# Patient Record
Sex: MALE | Race: WHITE | NOT HISPANIC OR LATINO | Employment: OTHER | URBAN - METROPOLITAN AREA
[De-identification: names, ages, dates, MRNs, and addresses within clinical notes are randomized per-mention and may not be internally consistent; named-entity substitution may affect disease eponyms.]

---

## 2017-01-06 RX ORDER — SIMVASTATIN 10 MG
TABLET ORAL
Qty: 90 TABLET | Refills: 0 | Status: SHIPPED | OUTPATIENT
Start: 2017-01-06 | End: 2017-04-01 | Stop reason: SDUPTHER

## 2017-01-23 RX ORDER — VALSARTAN AND HYDROCHLOROTHIAZIDE 80; 12.5 MG/1; MG/1
TABLET, FILM COATED ORAL
Qty: 90 TABLET | Refills: 0 | Status: SHIPPED | OUTPATIENT
Start: 2017-01-23 | End: 2017-04-17 | Stop reason: SDUPTHER

## 2017-03-08 RX ORDER — ESCITALOPRAM OXALATE 10 MG/1
TABLET ORAL
Qty: 30 TABLET | Refills: 0 | Status: SHIPPED | OUTPATIENT
Start: 2017-03-08 | End: 2017-04-05 | Stop reason: SDUPTHER

## 2017-04-03 RX ORDER — SIMVASTATIN 10 MG
TABLET ORAL
Qty: 90 TABLET | Refills: 0 | Status: SHIPPED | OUTPATIENT
Start: 2017-04-03 | End: 2017-06-26 | Stop reason: SDUPTHER

## 2017-04-05 RX ORDER — ESCITALOPRAM OXALATE 10 MG/1
TABLET ORAL
Qty: 15 TABLET | Refills: 0 | Status: SHIPPED | OUTPATIENT
Start: 2017-04-05 | End: 2018-06-13 | Stop reason: ALTCHOICE

## 2017-04-17 RX ORDER — VALSARTAN AND HYDROCHLOROTHIAZIDE 80; 12.5 MG/1; MG/1
TABLET, FILM COATED ORAL
Qty: 30 TABLET | Refills: 0 | Status: SHIPPED | OUTPATIENT
Start: 2017-04-17 | End: 2017-05-25 | Stop reason: SDUPTHER

## 2017-04-28 ENCOUNTER — OFFICE VISIT (OUTPATIENT)
Dept: FAMILY MEDICINE CLINIC | Facility: CLINIC | Age: 62
End: 2017-04-28

## 2017-04-28 VITALS
DIASTOLIC BLOOD PRESSURE: 82 MMHG | WEIGHT: 263 LBS | SYSTOLIC BLOOD PRESSURE: 138 MMHG | HEIGHT: 74 IN | OXYGEN SATURATION: 98 % | HEART RATE: 70 BPM | BODY MASS INDEX: 33.75 KG/M2

## 2017-04-28 DIAGNOSIS — Z00.00 HEALTHCARE MAINTENANCE: ICD-10-CM

## 2017-04-28 DIAGNOSIS — I10 BENIGN ESSENTIAL HYPERTENSION: ICD-10-CM

## 2017-04-28 DIAGNOSIS — E78.2 MIXED HYPERLIPIDEMIA: ICD-10-CM

## 2017-04-28 DIAGNOSIS — L98.9 SKIN LESION OF LEFT ARM: Primary | ICD-10-CM

## 2017-04-28 PROCEDURE — 99212 OFFICE O/P EST SF 10 MIN: CPT | Performed by: FAMILY MEDICINE

## 2017-04-28 NOTE — PROGRESS NOTES
"Subjective   Sachin Tolliver is a 61 y.o. male.     Chief Complaint   Patient presents with   • Suspicious Skin Lesion     L arm x 2 weeks      Social History   Substance Use Topics   • Smoking status: Never Smoker   • Smokeless tobacco: Never Used   • Alcohol use None       History of Present Illness         The following portions of the patient's history were reviewed and updated as appropriate:PMHroutine: Social history , Allergies, Current Medications, Active Problem List and Health Maintenance    Review of Systems   Constitutional: Negative for activity change, appetite change, chills, fatigue, fever and unexpected weight change.   HENT: Negative for congestion, ear pain, hearing loss, mouth sores, nosebleeds, rhinorrhea and sore throat.    Eyes: Negative for pain and visual disturbance.   Respiratory: Negative for cough, shortness of breath and wheezing.    Cardiovascular: Negative for chest pain, palpitations and leg swelling.   Gastrointestinal: Negative for abdominal distention, abdominal pain, blood in stool, constipation, diarrhea, nausea and vomiting.   Endocrine: Negative for cold intolerance and heat intolerance.   Genitourinary: Negative for difficulty urinating, discharge, dysuria, frequency, hematuria and urgency.   Musculoskeletal: Negative for back pain and joint swelling.   Skin: Negative for rash and wound.        Skin lesion    Neurological: Negative for dizziness, weakness, numbness and headaches.   Hematological: Does not bruise/bleed easily.   Psychiatric/Behavioral: Negative for confusion, dysphoric mood, sleep disturbance and suicidal ideas. The patient is not nervous/anxious.        Objective   Vitals:    04/28/17 1616   BP: 138/82   Pulse: 70   SpO2: 98%   Weight: 263 lb (119 kg)   Height: 74\" (188 cm)     Body mass index is 33.77 kg/(m^2).    Physical Exam   Constitutional: He appears well-developed and well-nourished.   Skin:    Heaped up lesion on left forearm with scaliness about 5 " mm in diameter   Psychiatric: He has a normal mood and affect. His behavior is normal. Judgment and thought content normal.   Vitals reviewed.      Assessment/Plan   Problem List Items Addressed This Visit        Cardiovascular and Mediastinum    Benign essential hypertension    Relevant Orders    Comprehensive metabolic panel    Lipid Panel With LDL/HDL Ratio    Hyperlipidemia    Relevant Orders    Comprehensive metabolic panel    Lipid Panel With LDL/HDL Ratio      Other Visit Diagnoses     Skin lesion of left arm    -  Primary    Relevant Orders    Ambulatory Referral to Dermatology    Healthcare maintenance        Relevant Orders    CBC and Differential

## 2017-05-12 RX ORDER — ESCITALOPRAM OXALATE 10 MG/1
TABLET ORAL
Qty: 90 TABLET | Refills: 0 | Status: SHIPPED | OUTPATIENT
Start: 2017-05-12 | End: 2017-05-18 | Stop reason: ALTCHOICE

## 2017-05-18 ENCOUNTER — OFFICE VISIT (OUTPATIENT)
Dept: CARDIOLOGY | Facility: CLINIC | Age: 62
End: 2017-05-18

## 2017-05-18 VITALS
HEIGHT: 74 IN | SYSTOLIC BLOOD PRESSURE: 138 MMHG | BODY MASS INDEX: 33.75 KG/M2 | WEIGHT: 263 LBS | DIASTOLIC BLOOD PRESSURE: 88 MMHG | HEART RATE: 72 BPM

## 2017-05-18 DIAGNOSIS — R01.1 MURMUR, CARDIAC: Primary | ICD-10-CM

## 2017-05-18 PROCEDURE — 93000 ELECTROCARDIOGRAM COMPLETE: CPT | Performed by: INTERNAL MEDICINE

## 2017-05-18 PROCEDURE — 99203 OFFICE O/P NEW LOW 30 MIN: CPT | Performed by: INTERNAL MEDICINE

## 2017-05-26 ENCOUNTER — TELEPHONE (OUTPATIENT)
Dept: FAMILY MEDICINE CLINIC | Facility: CLINIC | Age: 62
End: 2017-05-26

## 2017-05-26 DIAGNOSIS — G43.109 MIGRAINE WITH AURA AND WITHOUT STATUS MIGRAINOSUS, NOT INTRACTABLE: Primary | ICD-10-CM

## 2017-05-26 RX ORDER — BUTALBITAL, ACETAMINOPHEN AND CAFFEINE 50; 325; 40 MG/1; MG/1; MG/1
1 TABLET ORAL EVERY 6 HOURS PRN
Qty: 25 TABLET | Refills: 1 | OUTPATIENT
Start: 2017-05-26 | End: 2019-01-28

## 2017-05-26 RX ORDER — VALSARTAN AND HYDROCHLOROTHIAZIDE 80; 12.5 MG/1; MG/1
TABLET, FILM COATED ORAL
Qty: 90 TABLET | Refills: 0 | Status: SHIPPED | OUTPATIENT
Start: 2017-05-26 | End: 2017-08-18 | Stop reason: SDUPTHER

## 2017-05-30 ENCOUNTER — HOSPITAL ENCOUNTER (OUTPATIENT)
Dept: CARDIOLOGY | Facility: HOSPITAL | Age: 62
Discharge: HOME | End: 2017-05-30
Attending: INTERNAL MEDICINE | Admitting: INTERNAL MEDICINE

## 2017-05-30 VITALS
HEIGHT: 74 IN | BODY MASS INDEX: 33.75 KG/M2 | WEIGHT: 263 LBS | DIASTOLIC BLOOD PRESSURE: 86 MMHG | SYSTOLIC BLOOD PRESSURE: 130 MMHG | HEART RATE: 77 BPM

## 2017-05-30 DIAGNOSIS — R01.1 MURMUR, CARDIAC: ICD-10-CM

## 2017-05-30 LAB
ASCENDING AORTA: 3.9 CM
BH CV ECHO MEAS - ACS: 1.2 CM
BH CV ECHO MEAS - AO MAX PG (FULL): 64.9 MMHG
BH CV ECHO MEAS - AO MAX PG: 67.3 MMHG
BH CV ECHO MEAS - AO MEAN PG (FULL): 43 MMHG
BH CV ECHO MEAS - AO MEAN PG: 44.6 MMHG
BH CV ECHO MEAS - AO ROOT AREA (BSA CORRECTED): 1.4
BH CV ECHO MEAS - AO ROOT AREA: 8.8 CM^2
BH CV ECHO MEAS - AO ROOT DIAM: 3.3 CM
BH CV ECHO MEAS - AO V2 MAX: 410.2 CM/SEC
BH CV ECHO MEAS - AO V2 MEAN: 319.4 CM/SEC
BH CV ECHO MEAS - AO V2 VTI: 87.7 CM
BH CV ECHO MEAS - AVA(I,A): 1.2 CM^2
BH CV ECHO MEAS - AVA(I,D): 1.2 CM^2
BH CV ECHO MEAS - AVA(V,A): 1 CM^2
BH CV ECHO MEAS - AVA(V,D): 1 CM^2
BH CV ECHO MEAS - BSA(HAYCOCK): 2.5 M^2
BH CV ECHO MEAS - BSA: 2.4 M^2
BH CV ECHO MEAS - BZI_BMI: 33.8 KILOGRAMS/M^2
BH CV ECHO MEAS - BZI_METRIC_HEIGHT: 188 CM
BH CV ECHO MEAS - BZI_METRIC_WEIGHT: 119.3 KG
BH CV ECHO MEAS - CONTRAST EF (2CH): 62.1 ML/M^2
BH CV ECHO MEAS - CONTRAST EF 4CH: 59.1 ML/M^2
BH CV ECHO MEAS - EDV(MOD-SP2): 153 ML
BH CV ECHO MEAS - EDV(MOD-SP4): 137 ML
BH CV ECHO MEAS - EDV(TEICH): 140.5 ML
BH CV ECHO MEAS - EF(CUBED): 70 %
BH CV ECHO MEAS - EF(MOD-SP2): 62.1 %
BH CV ECHO MEAS - EF(MOD-SP4): 61 %
BH CV ECHO MEAS - EF(TEICH): 61.1 %
BH CV ECHO MEAS - ESV(MOD-SP2): 58 ML
BH CV ECHO MEAS - ESV(MOD-SP4): 56 ML
BH CV ECHO MEAS - ESV(TEICH): 54.6 ML
BH CV ECHO MEAS - FS: 33.1 %
BH CV ECHO MEAS - IVS/LVPW: 1
BH CV ECHO MEAS - IVSD: 1.1 CM
BH CV ECHO MEAS - LAT PEAK E' VEL: 8 CM/SEC
BH CV ECHO MEAS - LV DIASTOLIC VOL/BSA (35-75): 56.1 ML/M^2
BH CV ECHO MEAS - LV MASS(C)D: 242.3 GRAMS
BH CV ECHO MEAS - LV MASS(C)DI: 99.2 GRAMS/M^2
BH CV ECHO MEAS - LV MAX PG: 2.4 MMHG
BH CV ECHO MEAS - LV MEAN PG: 1.6 MMHG
BH CV ECHO MEAS - LV SYSTOLIC VOL/BSA (12-30): 22.9 ML/M^2
BH CV ECHO MEAS - LV V1 MAX: 77.1 CM/SEC
BH CV ECHO MEAS - LV V1 MEAN: 61.1 CM/SEC
BH CV ECHO MEAS - LV V1 VTI: 19.8 CM
BH CV ECHO MEAS - LVIDD: 5.4 CM
BH CV ECHO MEAS - LVIDS: 3.6 CM
BH CV ECHO MEAS - LVLD AP2: 8.7 CM
BH CV ECHO MEAS - LVLD AP4: 8.8 CM
BH CV ECHO MEAS - LVLS AP2: 6.9 CM
BH CV ECHO MEAS - LVLS AP4: 7.6 CM
BH CV ECHO MEAS - LVOT AREA (M): 5.3 CM^2
BH CV ECHO MEAS - LVOT AREA: 5.5 CM^2
BH CV ECHO MEAS - LVOT DIAM: 2 CM
BH CV ECHO MEAS - LVPWD: 1.1 CM
BH CV ECHO MEAS - MED PEAK E' VEL: 4 CM/SEC
BH CV ECHO MEAS - MR MAX PG: 56.5 MMHG
BH CV ECHO MEAS - MR MAX VEL: 375.9 CM/SEC
BH CV ECHO MEAS - MV A DUR: 0.1 SEC
BH CV ECHO MEAS - MV A MAX VEL: 67.4 CM/SEC
BH CV ECHO MEAS - MV DEC SLOPE: 159.7 CM/SEC^2
BH CV ECHO MEAS - MV DEC TIME: 0.23 SEC
BH CV ECHO MEAS - MV E MAX VEL: 37.3 CM/SEC
BH CV ECHO MEAS - MV E/A: 0.55
BH CV ECHO MEAS - MV MAX PG: 1.8 MMHG
BH CV ECHO MEAS - MV MEAN PG: 0.67 MMHG
BH CV ECHO MEAS - MV P1/2T MAX VEL: 38.1 CM/SEC
BH CV ECHO MEAS - MV P1/2T: 69.8 MSEC
BH CV ECHO MEAS - MV V2 MAX: 67.9 CM/SEC
BH CV ECHO MEAS - MV V2 MEAN: 37.3 CM/SEC
BH CV ECHO MEAS - MV V2 VTI: 16.8 CM
BH CV ECHO MEAS - MVA P1/2T LCG: 5.8 CM^2
BH CV ECHO MEAS - MVA(P1/2T): 3.2 CM^2
BH CV ECHO MEAS - MVA(VTI): 6.5 CM^2
BH CV ECHO MEAS - PA ACC TIME: 0.12 SEC
BH CV ECHO MEAS - PA MAX PG (FULL): 9 MMHG
BH CV ECHO MEAS - PA MAX PG: 10.8 MMHG
BH CV ECHO MEAS - PA PR(ACCEL): 26.7 MMHG
BH CV ECHO MEAS - PA V2 MAX: 164.2 CM/SEC
BH CV ECHO MEAS - PULM A REVS DUR: 0.07 SEC
BH CV ECHO MEAS - PULM A REVS VEL: 19.7 CM/SEC
BH CV ECHO MEAS - PULM DIAS VEL: 36 CM/SEC
BH CV ECHO MEAS - PULM S/D: 0.98
BH CV ECHO MEAS - PULM SYS VEL: 35.1 CM/SEC
BH CV ECHO MEAS - PVA(V,A): 1.4 CM^2
BH CV ECHO MEAS - PVA(V,D): 1.4 CM^2
BH CV ECHO MEAS - QP/QS: 0.5
BH CV ECHO MEAS - RV MAX PG: 1.8 MMHG
BH CV ECHO MEAS - RV MEAN PG: 0.97 MMHG
BH CV ECHO MEAS - RV V1 MAX: 66.9 CM/SEC
BH CV ECHO MEAS - RV V1 MEAN: 47 CM/SEC
BH CV ECHO MEAS - RV V1 VTI: 15.2 CM
BH CV ECHO MEAS - RVOT AREA: 3.6 CM^2
BH CV ECHO MEAS - RVOT DIAM: 2.1 CM
BH CV ECHO MEAS - RVSP: 3 MMHG
BH CV ECHO MEAS - SI(AO): 315.6 ML/M^2
BH CV ECHO MEAS - SI(CUBED): 44.8 ML/M^2
BH CV ECHO MEAS - SI(LVOT): 44.7 ML/M^2
BH CV ECHO MEAS - SI(MOD-SP2): 38.9 ML/M^2
BH CV ECHO MEAS - SI(MOD-SP4): 33.2 ML/M^2
BH CV ECHO MEAS - SI(TEICH): 35.2 ML/M^2
BH CV ECHO MEAS - SUP REN AO DIAM: 2.3 CM
BH CV ECHO MEAS - SV(AO): 770.6 ML
BH CV ECHO MEAS - SV(CUBED): 109.4 ML
BH CV ECHO MEAS - SV(LVOT): 109.2 ML
BH CV ECHO MEAS - SV(MOD-SP2): 95 ML
BH CV ECHO MEAS - SV(MOD-SP4): 81 ML
BH CV ECHO MEAS - SV(RVOT): 54.1 ML
BH CV ECHO MEAS - SV(TEICH): 85.8 ML
BH CV ECHO MEAS - TAPSE (>1.6): 2.9 CM2
BH CV ECHO MEAS - TR MAX V: 11 MMHG
BH CV ECHO MEAS - TR MAX VEL: 144.6 CM/SEC
BH CV XLRA - RV BASE: 3.7 CM
BH CV XLRA - TDI S': 17 CM/SEC
E/E' RATIO: 6.5
LEFT ATRIUM VOLUME INDEX: 25 ML/M2
SINUS: 3.5 CM
STJ: 3.5 CM

## 2017-05-30 PROCEDURE — C8929 TTE W OR WO FOL WCON,DOPPLER: HCPCS

## 2017-05-30 PROCEDURE — 25010000002 PERFLUTREN (DEFINITY) 8.476 MG IN SODIUM CHLORIDE 10 ML INJECTION: Performed by: INTERNAL MEDICINE

## 2017-05-30 PROCEDURE — 93306 TTE W/DOPPLER COMPLETE: CPT | Performed by: INTERNAL MEDICINE

## 2017-05-30 RX ADMIN — PERFLUTREN 1.5 ML: 6.52 INJECTION, SUSPENSION INTRAVENOUS at 11:21

## 2017-06-26 RX ORDER — SIMVASTATIN 10 MG
TABLET ORAL
Qty: 90 TABLET | Refills: 0 | Status: SHIPPED | OUTPATIENT
Start: 2017-06-26 | End: 2017-09-21 | Stop reason: SDUPTHER

## 2017-08-07 RX ORDER — ESCITALOPRAM OXALATE 10 MG/1
TABLET ORAL
Qty: 90 TABLET | Refills: 0 | Status: SHIPPED | OUTPATIENT
Start: 2017-08-07 | End: 2017-10-29 | Stop reason: SDUPTHER

## 2017-08-18 RX ORDER — VALSARTAN AND HYDROCHLOROTHIAZIDE 80; 12.5 MG/1; MG/1
TABLET, FILM COATED ORAL
Qty: 90 TABLET | Refills: 0 | Status: SHIPPED | OUTPATIENT
Start: 2017-08-18 | End: 2017-11-09 | Stop reason: SDUPTHER

## 2017-09-22 RX ORDER — SIMVASTATIN 10 MG
TABLET ORAL
Qty: 90 TABLET | Refills: 0 | Status: SHIPPED | OUTPATIENT
Start: 2017-09-22 | End: 2017-12-29 | Stop reason: SDUPTHER

## 2017-10-31 RX ORDER — ESCITALOPRAM OXALATE 10 MG/1
TABLET ORAL
Qty: 90 TABLET | Refills: 0 | Status: SHIPPED | OUTPATIENT
Start: 2017-10-31 | End: 2017-12-29 | Stop reason: SDUPTHER

## 2017-11-09 RX ORDER — VALSARTAN AND HYDROCHLOROTHIAZIDE 80; 12.5 MG/1; MG/1
TABLET, FILM COATED ORAL
Qty: 30 TABLET | Refills: 0 | Status: SHIPPED | OUTPATIENT
Start: 2017-11-09 | End: 2017-12-03 | Stop reason: SDUPTHER

## 2017-11-30 ENCOUNTER — OFFICE VISIT (OUTPATIENT)
Dept: GASTROENTEROLOGY | Facility: CLINIC | Age: 62
End: 2017-11-30

## 2017-11-30 VITALS
WEIGHT: 263 LBS | HEIGHT: 74 IN | TEMPERATURE: 98.2 F | BODY MASS INDEX: 33.75 KG/M2 | SYSTOLIC BLOOD PRESSURE: 122 MMHG | DIASTOLIC BLOOD PRESSURE: 74 MMHG

## 2017-11-30 DIAGNOSIS — K59.1 FUNCTIONAL DIARRHEA: Primary | ICD-10-CM

## 2017-11-30 PROCEDURE — 99213 OFFICE O/P EST LOW 20 MIN: CPT | Performed by: INTERNAL MEDICINE

## 2017-11-30 NOTE — PROGRESS NOTES
"Chief Complaint   Patient presents with   • Diarrhea     Subjective     HPI  Sachin Tolliver is a 62 y.o. male who presents for follow up of diarrhea.  Last seen in the office November 2016.  He was complaining of fecal urgency following eating, then have explosive liquid stool multiple times per day.  This lasted for a week or 2 but then his primary care physician had him start on Align probiotic and had improvement back to his baseline.     Since then, he has done well.  He has continued the probiotic. However, 3-4 weeks ago, the symptoms returned.  He was having urgent \"blasts of water.\"  Lots of urgency and having 4-5 episodes throughout the day.  No nocturnal stools.  Blood in his stools.  No changes in his medications, diet, activity.  During the symptoms he did have some sinusitis.  He took some OTC antihistamines and guaifenisen.  He was still taking probiotics during the entire time.  Symptoms since resolved on their own.      There is no pain, no nausea, no vomiting.  His weight is stable.    He does have a history of sigmoidectomy for diverticulosis in 2009.     No family history of colon cancer or colon polyps.  Next colonoscopy due 2019--He called Dr. Joshi's office and double checked this.    He follows with Dr. Irene regarding his heart murmur.    Past Medical History:   Diagnosis Date   • Aortic valve calcification    • Benign essential hypertension    • Broken nose    • Heart murmur    • Hemolytic anemia    • Hyperlipidemia    • Migraine    • Mild mitral regurgitation    • Pulmonic regurgitation     trace   • Reactive depression (situational)    • Skin lesion     suspicous, left arm x 2 weeks   • Tricuspid regurgitation     trace       Social History     Social History   • Marital status:      Spouse name: N/A   • Number of children: N/A   • Years of education: N/A     Social History Main Topics   • Smoking status: Never Smoker   • Smokeless tobacco: Never Used   • Alcohol use Yes   • " Drug use: No   • Sexual activity: Not Asked     Other Topics Concern   • None     Social History Narrative         Current Outpatient Prescriptions:   •  aspirin 81 MG tablet, Take by mouth., Disp: , Rfl:   •  butalbital-acetaminophen-caffeine (FIORICET, ESGIC) -40 MG per tablet, Take 1 tablet by mouth Every 6 (Six) Hours As Needed for Headache., Disp: 25 tablet, Rfl: 1  •  escitalopram (LEXAPRO) 10 MG tablet, TAKE 1 TABLET BY MOUTH EVERY DAY, Disp: 15 tablet, Rfl: 0  •  escitalopram (LEXAPRO) 10 MG tablet, TAKE 1 TABLET BY MOUTH DAILY, Disp: 90 tablet, Rfl: 0  •  Glucosamine-Chondroit-Vit C-Mn (GLUCOSAMINE CHONDR 1500 COMPLX PO), Take by mouth., Disp: , Rfl:   •  Melatonin 5 MG capsule, Take by mouth., Disp: , Rfl:   •  Multiple Vitamin (MULTIVITAMINS PO), Take by mouth., Disp: , Rfl:   •  Omega 3 1000 MG capsule, Take by mouth., Disp: , Rfl:   •  Probiotic Product (PROBIOTIC PO), Take  by mouth., Disp: , Rfl:   •  simvastatin (ZOCOR) 10 MG tablet, TAKE 1 TABLET BY MOUTH EVERY DAY, Disp: 90 tablet, Rfl: 0  •  valsartan-hydrochlorothiazide (DIOVAN-HCT) 80-12.5 MG per tablet, TAKE 1 TABLET BY MOUTH EVERY DAY, Disp: 30 tablet, Rfl: 0    Review of Systems   Constitutional: Negative for activity change, appetite change and fatigue.   HENT: Negative for sore throat and trouble swallowing.    Respiratory: Negative.    Cardiovascular: Negative.    Gastrointestinal: Positive for diarrhea. Negative for abdominal distention, abdominal pain, anal bleeding, blood in stool, nausea and vomiting.   Endocrine: Negative for cold intolerance and heat intolerance.   Genitourinary: Negative for difficulty urinating, dysuria and frequency.   Musculoskeletal: Negative for arthralgias, back pain and myalgias.   Skin: Negative.    Hematological: Negative for adenopathy. Does not bruise/bleed easily.   All other systems reviewed and are negative.      Objective   Vitals:    11/30/17 0826   BP: 122/74   Temp: 98.2 °F (36.8 °C)     Last  Weight    11/30/17  0826   Weight: 263 lb (119 kg)     Body mass index is 33.77 kg/(m^2).      Physical Exam   Constitutional: He is oriented to person, place, and time. He appears well-developed and well-nourished. No distress.   HENT:   Head: Normocephalic and atraumatic.   Right Ear: External ear normal.   Left Ear: External ear normal.   Nose: Nose normal.   Eyes: Conjunctivae and EOM are normal. No scleral icterus.   Cardiovascular: Normal rate, regular rhythm and intact distal pulses.  Exam reveals no gallop.    Murmur heard.  No lower extremity edema   Pulmonary/Chest: Effort normal and breath sounds normal. No respiratory distress. He has no wheezes.   Abdominal: Soft. Normal appearance and bowel sounds are normal. He exhibits no distension and no mass. There is no hepatosplenomegaly. There is no tenderness. There is no rigidity, no rebound and no guarding. No hernia.   Obese   Genitourinary:   Genitourinary Comments: Rectal exam deferred   Musculoskeletal: Normal range of motion. He exhibits no edema or tenderness.   Normal digits and nails of both hands.  No atrophy or wasting of upper or lower extremeties   Neurological: He is alert and oriented to person, place, and time. He displays no atrophy. Coordination normal.   Skin: Skin is warm and dry. No rash noted. He is not diaphoretic. No erythema.   Psychiatric: He has a normal mood and affect. His behavior is normal. Judgment and thought content normal.   Vitals reviewed.      WBC   Date Value Ref Range Status   12/09/2015 5.35 4.50 - 10.70 K/Cumm Final     RBC   Date Value Ref Range Status   12/09/2015 4.59 (L) 4.60 - 6.00 Million Final     Hemoglobin   Date Value Ref Range Status   12/09/2015 14.1 13.7 - 17.6 g/dL Final     Hematocrit   Date Value Ref Range Status   12/09/2015 42.1 40.4 - 52.2 % Final     MCV   Date Value Ref Range Status   12/09/2015 91.7 79.8 - 96.2 fL Final     MCH   Date Value Ref Range Status   12/09/2015 30.7 27.0 - 32.7 pg Final      MCHC   Date Value Ref Range Status   12/09/2015 33.5 32.6 - 36.4 g/dL Final     RDW   Date Value Ref Range Status   12/09/2015 13.4 11.5 - 14.5 % Final     Platelets   Date Value Ref Range Status   12/09/2015 239 140 - 500 K/Cumm Final     Neutrophil Rel %   Date Value Ref Range Status   12/09/2015 47.1 42.7 - 76.0 % Final     Lymphocyte Rel %   Date Value Ref Range Status   12/09/2015 37.8 19.6 - 45.3 % Final     Monocyte Rel %   Date Value Ref Range Status   12/09/2015 9.9 5.0 - 12.0 % Final     Eosinophil Rel %   Date Value Ref Range Status   12/09/2015 2.6 0.3 - 6.2 % Final     Basophil Rel %   Date Value Ref Range Status   12/09/2015 1.3 0.0 - 1.5 % Final     Neutrophils Absolute   Date Value Ref Range Status   12/09/2015 2.5 1.9 - 8.1 K/Cumm Final     Lymphocytes Absolute   Date Value Ref Range Status   12/09/2015 2.0 0.9 - 4.8 K/Cumm Final     Monocytes Absolute   Date Value Ref Range Status   12/09/2015 0.5 0.2 - 1.2 K/Cumm Final     Eosinophils Absolute   Date Value Ref Range Status   12/09/2015 0.1 0.0 - 0.7 K/Cumm Final     Basophils Absolute   Date Value Ref Range Status   12/09/2015 0.1 0.0 - 0.2 K/Cumm Final       Lab Results   Component Value Date    BUN 18 12/09/2015    CREATININE 1.24 12/09/2015    EGFRIFNONA 60 12/09/2015    EGFRIFAFRI >60 12/09/2015    BCR 15 12/09/2015    CO2 24 12/09/2015    CALCIUM 9.6 12/09/2015    PROTENTOTREF 7.1 12/09/2015    ALBUMIN 4.4 12/09/2015    LABIL2 1.6 12/09/2015    AST 21 12/09/2015    ALT 18 12/09/2015         Imaging Results (last 7 days)     ** No results found for the last 168 hours. **            Assessment/Plan    1. Diarrhea: Limited episodes but moderately severe; self resolve.  Current episode was in the setting of some sinusitis.  I do question if it was some kind of viral issue causing the diarrhea did occur at that time versus related to the over-the-counter medication that he took with it, ie, antihistamine    Plan  We discussed our options.  At  this time, his symptoms have resolved.  The yield of either stool studies or colonoscopy is low at this time.  Certainly the limited and rare occurrences suggest against something more worrisome such as an inflammatory colitis.  I also think there is a strong likelihood that his symptoms may been related to either a viral illness given his concomitant sinusitis or the medication that he took to treat this (antihistamine).  I think the best option at this time is to continue to monitor symptoms.  He is to call the office should the diarrhea returned.  At that time we will check stool studies and start him on an anti-spasmodic.  Also I think should the symptoms recur again then repeat colonoscopy is warranted.  He is to continue the probiotic in the meantime    Greater than 8 minutes out of 15 were spent in face-to-face discussion of our options-- risks, benefits, and yields of each-- for further evaluation of his symptoms.    Sachin was seen today for diarrhea.    Diagnoses and all orders for this visit:    Functional diarrhea      Dictated utilizing Dragon dictation

## 2017-11-30 NOTE — PATIENT INSTRUCTIONS
Continue the probiotic    If the diarrhea returns, call the office. Will check stool studies and start an antispasmodic medication.      For any additional questions, concerns or changes to your condition after today's office visit please contact the office at [phone].

## 2017-12-04 RX ORDER — VALSARTAN AND HYDROCHLOROTHIAZIDE 80; 12.5 MG/1; MG/1
TABLET, FILM COATED ORAL
Qty: 15 TABLET | Refills: 0 | Status: SHIPPED | OUTPATIENT
Start: 2017-12-04 | End: 2017-12-29 | Stop reason: SDUPTHER

## 2017-12-05 ENCOUNTER — OFFICE VISIT (OUTPATIENT)
Dept: CARDIOLOGY | Facility: CLINIC | Age: 62
End: 2017-12-05

## 2017-12-05 VITALS
WEIGHT: 263 LBS | DIASTOLIC BLOOD PRESSURE: 80 MMHG | HEIGHT: 74 IN | HEART RATE: 76 BPM | BODY MASS INDEX: 33.75 KG/M2 | SYSTOLIC BLOOD PRESSURE: 128 MMHG

## 2017-12-05 DIAGNOSIS — I49.3 PVC (PREMATURE VENTRICULAR CONTRACTION): ICD-10-CM

## 2017-12-05 DIAGNOSIS — I35.0 NONRHEUMATIC AORTIC VALVE STENOSIS: Primary | ICD-10-CM

## 2017-12-05 PROCEDURE — 93000 ELECTROCARDIOGRAM COMPLETE: CPT | Performed by: INTERNAL MEDICINE

## 2017-12-05 PROCEDURE — 99213 OFFICE O/P EST LOW 20 MIN: CPT | Performed by: INTERNAL MEDICINE

## 2017-12-05 NOTE — PROGRESS NOTES
Subjective:     Encounter Date:12/05/2017      Patient ID: Sachin Tolliver is a 62 y.o. male.    Chief Complaint:  Heart Murmur   This is a chronic problem. The current episode started more than 1 month ago. The problem has been unchanged. Pertinent negatives include no chest pain, diaphoresis, fatigue, numbness or visual change.       62-year-old gentleman who presents today for reevaluation.  She was found to have moderate to severe aortic stenosis 6 months ago.  He presents today doing well really with no problems.  No chest pain shortness of breath lower extremity edema signs of heart failure no syncope no near syncope.  His murmur to me sounds about same        Review of Systems   Constitution: Negative for diaphoresis and fatigue.   Cardiovascular: Negative for chest pain.   Neurological: Negative for numbness.   All other systems reviewed and are negative.        ECG 12 Lead  Date/Time: 12/5/2017 1:28 PM  Performed by: EDIN DELANEY  Authorized by: EDIN DELANEY   Comparison: compared with previous ECG from 5/18/2017  Similar to previous ECG  Rhythm: sinus rhythm  Ectopy: unifocal PVCs  Clinical impression: abnormal ECG               Objective:     Physical Exam   Constitutional: He is oriented to person, place, and time. He appears well-developed.   HENT:   Head: Normocephalic.   Eyes: Conjunctivae are normal.   Neck: Normal range of motion.   Cardiovascular: Normal rate and regular rhythm.    Murmur heard.   Harsh midsystolic murmur is present with a grade of 3/6  at the upper right sternal border  Pulmonary/Chest: Breath sounds normal.   Abdominal: Soft. Bowel sounds are normal.   Musculoskeletal: Normal range of motion. He exhibits no edema.   Neurological: He is alert and oriented to person, place, and time.   Skin: Skin is warm and dry.   Psychiatric: He has a normal mood and affect. His behavior is normal.   Vitals reviewed.      Lab Review:       Assessment:          Diagnosis Plan   1.  Nonrheumatic aortic valve stenosis  Adult Transthoracic Echo Complete W/ Cont if Necessary Per Protocol    ECG 12 Lead   2. PVC (premature ventricular contraction)            Plan:       1.  Moderate to severe aortic stenosis.  Clinically he remains asymptomatic.  His murmur really hasn't changed much.  In light of that I elected to repeat his echo 6 months from now.  Again I reviewed signs and symptoms for him to look for and to contact me should they arise.  Things that I covered that not inclusive include chest pain shortness of breath syncope near syncope swelling and etc.  2.  Blood pressures great  3.  Patient did have PVCs noted on his EKG today.  They were not symptomatic.  I would follow for now clinically  4. Follow-up 6 months we will do an echo prior to being seen we'll discuss findings at his visit.

## 2017-12-29 ENCOUNTER — OFFICE VISIT (OUTPATIENT)
Dept: FAMILY MEDICINE CLINIC | Facility: CLINIC | Age: 62
End: 2017-12-29

## 2017-12-29 VITALS
SYSTOLIC BLOOD PRESSURE: 118 MMHG | HEIGHT: 74 IN | OXYGEN SATURATION: 98 % | DIASTOLIC BLOOD PRESSURE: 82 MMHG | HEART RATE: 74 BPM | BODY MASS INDEX: 34.14 KG/M2 | WEIGHT: 266 LBS

## 2017-12-29 DIAGNOSIS — I10 BENIGN ESSENTIAL HYPERTENSION: Primary | ICD-10-CM

## 2017-12-29 DIAGNOSIS — E78.2 MIXED HYPERLIPIDEMIA: ICD-10-CM

## 2017-12-29 PROCEDURE — 99213 OFFICE O/P EST LOW 20 MIN: CPT | Performed by: FAMILY MEDICINE

## 2017-12-29 RX ORDER — VALSARTAN AND HYDROCHLOROTHIAZIDE 80; 12.5 MG/1; MG/1
1 TABLET, FILM COATED ORAL DAILY
Qty: 90 TABLET | Refills: 1 | Status: SHIPPED | OUTPATIENT
Start: 2017-12-29 | End: 2018-07-14 | Stop reason: HOSPADM

## 2017-12-29 RX ORDER — SIMVASTATIN 10 MG
10 TABLET ORAL NIGHTLY
Qty: 90 TABLET | Refills: 0 | Status: SHIPPED | OUTPATIENT
Start: 2017-12-29 | End: 2018-03-27 | Stop reason: SDUPTHER

## 2017-12-29 NOTE — PROGRESS NOTES
Sachin Tolliver is a 62 y.o. male.      Assessment/Plan   Problem List Items Addressed This Visit        Cardiovascular and Mediastinum    Benign essential hypertension - Primary    [facility]     [facility]  BP is controlled well. He will recheck in six months. He will continue present meds.            Relevant Medications    valsartan-hydrochlorothiazide (DIOVAN-HCT) 80-12.5 MG per tablet    Hyperlipidemia    Overview     Heather 12/29/2017 Needs lab drawn. He promises to get it done at the hospital.          Relevant Medications    simvastatin (ZOCOR) 10 MG tablet             Return in about 6 months (around 6/29/2018).  Patient Instructions   Agree with weight loss.       Chief Complaint   Patient presents with   • Hypertension   • Med Refill     Social History   Substance Use Topics   • Smoking status: Never Smoker   • Smokeless tobacco: Never Used   • Alcohol use Yes      Comment: caffeine  use       History of Present Illness     Patient is here for follow-up of hypertension. He is not exercising and is adherent to a low-salt diet. Patient does not check BP at home.. Patient denies chest pain and dyspnea. Cardiovascular risk factors: advanced age (older than 55 for men, 65 for women), dyslipidemia, hypertension, male gender, obesity (BMI >= 30 kg/m2) and sedentary lifestyle. Use of agents associated with hypertension: none. History of target organ damage: none. He is compliant with meds.     The following portions of the patient's history were reviewed and updated as appropriate:PMHroutine: Social history , Allergies, Current Medications, Active Problem List and Health Maintenance    Review of Systems   Constitutional: Negative for activity change, appetite change, chills, fatigue, fever and unexpected weight change.   HENT: Negative for congestion, ear pain, hearing loss, mouth sores, nosebleeds, rhinorrhea and sore throat.    Eyes: Negative for pain and visual disturbance.   Respiratory: Negative  "for cough, shortness of breath and wheezing.    Cardiovascular: Negative for chest pain, palpitations and leg swelling.   Gastrointestinal: Negative for abdominal distention, abdominal pain, blood in stool, constipation, diarrhea, nausea and vomiting.   Endocrine: Negative for cold intolerance and heat intolerance.   Genitourinary: Negative for difficulty urinating, discharge, dysuria, frequency, hematuria and urgency.   Musculoskeletal: Negative for back pain and joint swelling.   Skin: Negative for rash and wound.   Neurological: Negative for dizziness, weakness, numbness and headaches.   Hematological: Does not bruise/bleed easily.   Psychiatric/Behavioral: Negative for confusion, dysphoric mood, sleep disturbance and suicidal ideas. The patient is not nervous/anxious.        Objective   Vitals:    12/29/17 1452   BP: 118/82   Pulse: 74   SpO2: 98%   Weight: 121 kg (266 lb)   Height: 188 cm (74.02\")     Body mass index is 34.14 kg/(m^2).  Physical Exam   Constitutional: He is oriented to person, place, and time. Vital signs are normal. He appears well-developed and well-nourished. No distress.   HENT:   Head: Normocephalic.   Cardiovascular: Normal rate.    Murmur (2/6 holosystolic murmur with trigeminy rhythm) heard.  Pulmonary/Chest: Effort normal and breath sounds normal.   Neurological: He is alert and oriented to person, place, and time. Gait normal.   Psychiatric: He has a normal mood and affect. His behavior is normal. Judgment and thought content normal.   Vitals reviewed.    Reviewed Data:        "

## 2018-01-04 DIAGNOSIS — E78.5 HYPERLIPIDEMIA, UNSPECIFIED HYPERLIPIDEMIA TYPE: Primary | ICD-10-CM

## 2018-01-04 DIAGNOSIS — Z00.00 HEALTHCARE MAINTENANCE: ICD-10-CM

## 2018-01-05 ENCOUNTER — LAB (OUTPATIENT)
Dept: LAB | Facility: HOSPITAL | Age: 63
End: 2018-01-05

## 2018-01-05 DIAGNOSIS — Z00.00 HEALTHCARE MAINTENANCE: Primary | ICD-10-CM

## 2018-01-05 LAB
ALBUMIN SERPL-MCNC: 4.3 G/DL (ref 3.5–5.2)
ALBUMIN/GLOB SERPL: 1.4 G/DL
ALP SERPL-CCNC: 68 U/L (ref 39–117)
ALT SERPL W P-5'-P-CCNC: 22 U/L (ref 1–41)
ANION GAP SERPL CALCULATED.3IONS-SCNC: 11.5 MMOL/L
AST SERPL-CCNC: 20 U/L (ref 1–40)
BASOPHILS # BLD AUTO: 0.07 10*3/MM3 (ref 0–0.2)
BASOPHILS NFR BLD AUTO: 1.1 % (ref 0–1.5)
BILIRUB SERPL-MCNC: 0.8 MG/DL (ref 0.1–1.2)
BUN BLD-MCNC: 18 MG/DL (ref 8–23)
BUN/CREAT SERPL: 15 (ref 7–25)
CALCIUM SPEC-SCNC: 9 MG/DL (ref 8.6–10.5)
CHLORIDE SERPL-SCNC: 100 MMOL/L (ref 98–107)
CHOLEST SERPL-MCNC: 187 MG/DL (ref 0–200)
CO2 SERPL-SCNC: 25.5 MMOL/L (ref 22–29)
CREAT BLD-MCNC: 1.2 MG/DL (ref 0.76–1.27)
DEPRECATED RDW RBC AUTO: 46.4 FL (ref 37–54)
EOSINOPHIL # BLD AUTO: 0.19 10*3/MM3 (ref 0–0.7)
EOSINOPHIL NFR BLD AUTO: 2.9 % (ref 0.3–6.2)
ERYTHROCYTE [DISTWIDTH] IN BLOOD BY AUTOMATED COUNT: 13.7 % (ref 11.5–14.5)
GFR SERPL CREATININE-BSD FRML MDRD: 62 ML/MIN/1.73
GLOBULIN UR ELPH-MCNC: 3.1 GM/DL
GLUCOSE BLD-MCNC: 93 MG/DL (ref 65–99)
HCT VFR BLD AUTO: 42.6 % (ref 40.4–52.2)
HDLC SERPL-MCNC: 42 MG/DL (ref 40–60)
HGB BLD-MCNC: 14 G/DL (ref 13.7–17.6)
IMM GRANULOCYTES # BLD: 0 10*3/MM3 (ref 0–0.03)
IMM GRANULOCYTES NFR BLD: 0 % (ref 0–0.5)
LDLC SERPL CALC-MCNC: 127 MG/DL (ref 0–100)
LDLC/HDLC SERPL: 3.03 {RATIO}
LYMPHOCYTES # BLD AUTO: 2.09 10*3/MM3 (ref 0.9–4.8)
LYMPHOCYTES NFR BLD AUTO: 32.4 % (ref 19.6–45.3)
MCH RBC QN AUTO: 30.7 PG (ref 27–32.7)
MCHC RBC AUTO-ENTMCNC: 32.9 G/DL (ref 32.6–36.4)
MCV RBC AUTO: 93.4 FL (ref 79.8–96.2)
MONOCYTES # BLD AUTO: 0.58 10*3/MM3 (ref 0.2–1.2)
MONOCYTES NFR BLD AUTO: 9 % (ref 5–12)
NEUTROPHILS # BLD AUTO: 3.53 10*3/MM3 (ref 1.9–8.1)
NEUTROPHILS NFR BLD AUTO: 54.6 % (ref 42.7–76)
PLATELET # BLD AUTO: 223 10*3/MM3 (ref 140–500)
PMV BLD AUTO: 12.2 FL (ref 6–12)
POTASSIUM BLD-SCNC: 4.1 MMOL/L (ref 3.5–5.2)
PROT SERPL-MCNC: 7.4 G/DL (ref 6–8.5)
RBC # BLD AUTO: 4.56 10*6/MM3 (ref 4.6–6)
SODIUM BLD-SCNC: 137 MMOL/L (ref 136–145)
TRIGL SERPL-MCNC: 89 MG/DL (ref 0–150)
VLDLC SERPL-MCNC: 17.8 MG/DL (ref 5–40)
WBC NRBC COR # BLD: 6.46 10*3/MM3 (ref 4.5–10.7)

## 2018-01-05 PROCEDURE — 36415 COLL VENOUS BLD VENIPUNCTURE: CPT

## 2018-01-05 PROCEDURE — 85025 COMPLETE CBC W/AUTO DIFF WBC: CPT | Performed by: FAMILY MEDICINE

## 2018-01-05 PROCEDURE — 80053 COMPREHEN METABOLIC PANEL: CPT | Performed by: FAMILY MEDICINE

## 2018-01-05 PROCEDURE — 80061 LIPID PANEL: CPT

## 2018-01-29 RX ORDER — ESCITALOPRAM OXALATE 10 MG/1
TABLET ORAL
Qty: 90 TABLET | Refills: 0 | Status: SHIPPED | OUTPATIENT
Start: 2018-01-29 | End: 2018-05-22 | Stop reason: SDUPTHER

## 2018-03-27 DIAGNOSIS — E78.2 MIXED HYPERLIPIDEMIA: ICD-10-CM

## 2018-03-27 RX ORDER — SIMVASTATIN 10 MG
10 TABLET ORAL NIGHTLY
Qty: 90 TABLET | Refills: 0 | Status: SHIPPED | OUTPATIENT
Start: 2018-03-27 | End: 2018-07-14 | Stop reason: HOSPADM

## 2018-05-23 RX ORDER — ESCITALOPRAM OXALATE 10 MG/1
TABLET ORAL
Qty: 30 TABLET | Refills: 0 | Status: SHIPPED | OUTPATIENT
Start: 2018-05-23 | End: 2018-06-20 | Stop reason: ALTCHOICE

## 2018-06-13 ENCOUNTER — OFFICE VISIT (OUTPATIENT)
Dept: CARDIOLOGY | Facility: CLINIC | Age: 63
End: 2018-06-13

## 2018-06-13 ENCOUNTER — HOSPITAL ENCOUNTER (OUTPATIENT)
Dept: CARDIOLOGY | Facility: HOSPITAL | Age: 63
Discharge: HOME | End: 2018-06-13
Attending: INTERNAL MEDICINE | Admitting: INTERNAL MEDICINE

## 2018-06-13 VITALS
BODY MASS INDEX: 35.21 KG/M2 | SYSTOLIC BLOOD PRESSURE: 124 MMHG | WEIGHT: 260 LBS | DIASTOLIC BLOOD PRESSURE: 92 MMHG | HEART RATE: 75 BPM | HEIGHT: 72 IN

## 2018-06-13 VITALS
BODY MASS INDEX: 36.03 KG/M2 | WEIGHT: 266 LBS | DIASTOLIC BLOOD PRESSURE: 92 MMHG | SYSTOLIC BLOOD PRESSURE: 124 MMHG | HEART RATE: 75 BPM | HEIGHT: 72 IN

## 2018-06-13 DIAGNOSIS — I35.0 NONRHEUMATIC AORTIC VALVE STENOSIS: Primary | ICD-10-CM

## 2018-06-13 DIAGNOSIS — I35.0 NONRHEUMATIC AORTIC VALVE STENOSIS: ICD-10-CM

## 2018-06-13 PROCEDURE — 93306 TTE W/DOPPLER COMPLETE: CPT | Performed by: INTERNAL MEDICINE

## 2018-06-13 PROCEDURE — 25010000002 PERFLUTREN (DEFINITY) 8.476 MG IN SODIUM CHLORIDE 0.9 % 10 ML INJECTION: Performed by: INTERNAL MEDICINE

## 2018-06-13 PROCEDURE — 93306 TTE W/DOPPLER COMPLETE: CPT

## 2018-06-13 PROCEDURE — 99214 OFFICE O/P EST MOD 30 MIN: CPT | Performed by: INTERNAL MEDICINE

## 2018-06-13 RX ADMIN — PERFLUTREN 1.5 ML: 6.52 INJECTION, SUSPENSION INTRAVENOUS at 12:19

## 2018-06-13 NOTE — PROGRESS NOTES
Subjective:     Encounter Date:12/05/2017      Patient ID: Sachin Tolliver is a 62 y.o. male.    Chief Complaint:  Heart Murmur   This is a chronic problem. The current episode started more than 1 month ago. The problem has been unchanged. Pertinent negatives include no chest pain, diaphoresis, fatigue, numbness or visual change.       62-year-old gentleman who presents today for reevaluation.  He was found to have moderate to severe aortic stenosis 6 months ago.  He presents today doing well really with no problems.  Patient still clinically remains asymptomatic.        Review of Systems   Constitution: Negative for diaphoresis and fatigue.   Cardiovascular: Negative for chest pain.   Neurological: Negative for numbness.   All other systems reviewed and are negative.      Procedures         Objective:     Physical Exam   Constitutional: He is oriented to person, place, and time. He appears well-developed.   HENT:   Head: Normocephalic.   Eyes: Conjunctivae are normal.   Neck: Normal range of motion.   Cardiovascular: Normal rate and regular rhythm.    Murmur heard.   Harsh midsystolic murmur is present with a grade of 1/6  at the upper right sternal border  Pulmonary/Chest: Breath sounds normal.   Abdominal: Soft. Bowel sounds are normal.   Musculoskeletal: Normal range of motion. He exhibits no edema.   Neurological: He is alert and oriented to person, place, and time.   Skin: Skin is warm and dry.   Psychiatric: He has a normal mood and affect. His behavior is normal.   Vitals reviewed.      Lab Review:       Assessment:          Diagnosis Plan   1. Nonrheumatic aortic valve stenosis  Ambulatory Referral to Cardiothoracic Surgery          Plan:       1.  Patient with moderate to severe aortic stenosis.  He had a repeat echocardiogram today that showed his valve area had not changed and his gradients have not changed however his LV cavity has significantly enlarged.  At this point I think we need to consult  cardiothoracic surgery for an opinion.  I think it's time that we need to replace his valve but will wait for their input.  On physical exam today his murmur was actually less which is not a good sign.  2.  Blood pressures great  3.  Patient did have PVCs noted on his EKG today.  They were not symptomatic.  I would follow for now clinically

## 2018-06-14 LAB
AORTIC DIMENSIONLESS INDEX: 0.3 (DI)
ASCENDING AORTA: 3.9 CM
BH CV ECHO MEAS - ACS: 1.8 CM
BH CV ECHO MEAS - AO MAX PG (FULL): 80.1 MMHG
BH CV ECHO MEAS - AO MAX PG: 83.2 MMHG
BH CV ECHO MEAS - AO MEAN PG (FULL): 44.2 MMHG
BH CV ECHO MEAS - AO MEAN PG: 46.1 MMHG
BH CV ECHO MEAS - AO ROOT AREA (BSA CORRECTED): 1.7
BH CV ECHO MEAS - AO ROOT AREA: 12.7 CM^2
BH CV ECHO MEAS - AO ROOT DIAM: 4 CM
BH CV ECHO MEAS - AO V2 MAX: 456.1 CM/SEC
BH CV ECHO MEAS - AO V2 MEAN: 320.1 CM/SEC
BH CV ECHO MEAS - AO V2 VTI: 96.3 CM
BH CV ECHO MEAS - AVA(I,A): 1.2 CM^2
BH CV ECHO MEAS - AVA(I,D): 1.2 CM^2
BH CV ECHO MEAS - AVA(V,A): 0.91 CM^2
BH CV ECHO MEAS - AVA(V,D): 0.91 CM^2
BH CV ECHO MEAS - BSA(HAYCOCK): 2.5 M^2
BH CV ECHO MEAS - BSA: 2.4 M^2
BH CV ECHO MEAS - BZI_BMI: 36.1 KILOGRAMS/M^2
BH CV ECHO MEAS - BZI_METRIC_HEIGHT: 182.9 CM
BH CV ECHO MEAS - BZI_METRIC_WEIGHT: 120.7 KG
BH CV ECHO MEAS - CONTRAST EF (2CH): 64.2 ML/M^2
BH CV ECHO MEAS - CONTRAST EF 4CH: 65.9 ML/M^2
BH CV ECHO MEAS - EDV(MOD-SP2): 190 ML
BH CV ECHO MEAS - EDV(MOD-SP4): 182 ML
BH CV ECHO MEAS - EDV(TEICH): 265.7 ML
BH CV ECHO MEAS - EF(CUBED): 68.1 %
BH CV ECHO MEAS - EF(MOD-BP): 65 %
BH CV ECHO MEAS - EF(MOD-SP2): 64.2 %
BH CV ECHO MEAS - EF(MOD-SP4): 65.9 %
BH CV ECHO MEAS - EF(TEICH): 58.2 %
BH CV ECHO MEAS - ESV(MOD-SP2): 68 ML
BH CV ECHO MEAS - ESV(MOD-SP4): 62 ML
BH CV ECHO MEAS - ESV(TEICH): 110.9 ML
BH CV ECHO MEAS - FS: 31.7 %
BH CV ECHO MEAS - IVS/LVPW: 1
BH CV ECHO MEAS - IVSD: 1.2 CM
BH CV ECHO MEAS - LAT PEAK E' VEL: 10 CM/SEC
BH CV ECHO MEAS - LV DIASTOLIC VOL/BSA (35-75): 75.7 ML/M^2
BH CV ECHO MEAS - LV MASS(C)D: 415.9 GRAMS
BH CV ECHO MEAS - LV MASS(C)DI: 173 GRAMS/M^2
BH CV ECHO MEAS - LV MAX PG: 3.1 MMHG
BH CV ECHO MEAS - LV MEAN PG: 1.9 MMHG
BH CV ECHO MEAS - LV SYSTOLIC VOL/BSA (12-30): 25.8 ML/M^2
BH CV ECHO MEAS - LV V1 MAX: 87.9 CM/SEC
BH CV ECHO MEAS - LV V1 MEAN: 65.8 CM/SEC
BH CV ECHO MEAS - LV V1 VTI: 24.1 CM
BH CV ECHO MEAS - LVIDD: 7.1 CM
BH CV ECHO MEAS - LVIDS: 4.9 CM
BH CV ECHO MEAS - LVLD AP2: 9.1 CM
BH CV ECHO MEAS - LVLD AP4: 9.7 CM
BH CV ECHO MEAS - LVLS AP2: 7.5 CM
BH CV ECHO MEAS - LVLS AP4: 7.3 CM
BH CV ECHO MEAS - LVOT AREA (M): 4.9 CM^2
BH CV ECHO MEAS - LVOT AREA: 4.7 CM^2
BH CV ECHO MEAS - LVOT DIAM: 2.5 CM
BH CV ECHO MEAS - LVPWD: 1.2 CM
BH CV ECHO MEAS - MED PEAK E' VEL: 9 CM/SEC
BH CV ECHO MEAS - MR MAX PG: 83.2 MMHG
BH CV ECHO MEAS - MR MAX VEL: 456.1 CM/SEC
BH CV ECHO MEAS - MV A DUR: 0.13 SEC
BH CV ECHO MEAS - MV A MAX VEL: 68.7 CM/SEC
BH CV ECHO MEAS - MV DEC SLOPE: 168 CM/SEC^2
BH CV ECHO MEAS - MV DEC TIME: 0.25 SEC
BH CV ECHO MEAS - MV E MAX VEL: 42.2 CM/SEC
BH CV ECHO MEAS - MV E/A: 0.61
BH CV ECHO MEAS - MV MAX PG: 3 MMHG
BH CV ECHO MEAS - MV MEAN PG: 1.2 MMHG
BH CV ECHO MEAS - MV P1/2T MAX VEL: 43 CM/SEC
BH CV ECHO MEAS - MV P1/2T: 74.9 MSEC
BH CV ECHO MEAS - MV V2 MAX: 86.9 CM/SEC
BH CV ECHO MEAS - MV V2 MEAN: 49.9 CM/SEC
BH CV ECHO MEAS - MV V2 VTI: 21.4 CM
BH CV ECHO MEAS - MVA P1/2T LCG: 5.1 CM^2
BH CV ECHO MEAS - MVA(P1/2T): 2.9 CM^2
BH CV ECHO MEAS - MVA(VTI): 5.3 CM^2
BH CV ECHO MEAS - PA ACC TIME: 0.12 SEC
BH CV ECHO MEAS - PA MAX PG (FULL): 12 MMHG
BH CV ECHO MEAS - PA MAX PG: 12.8 MMHG
BH CV ECHO MEAS - PA PR(ACCEL): 26.7 MMHG
BH CV ECHO MEAS - PA V2 MAX: 179 CM/SEC
BH CV ECHO MEAS - PULM A REVS DUR: 0.1 SEC
BH CV ECHO MEAS - PULM A REVS VEL: 25.7 CM/SEC
BH CV ECHO MEAS - PULM DIAS VEL: 36.8 CM/SEC
BH CV ECHO MEAS - PULM S/D: 1.3
BH CV ECHO MEAS - PULM SYS VEL: 47.2 CM/SEC
BH CV ECHO MEAS - PVA(V,A): 1.2 CM^2
BH CV ECHO MEAS - PVA(V,D): 1.2 CM^2
BH CV ECHO MEAS - QP/QS: 0.42
BH CV ECHO MEAS - RV MAX PG: 0.81 MMHG
BH CV ECHO MEAS - RV MEAN PG: 0.38 MMHG
BH CV ECHO MEAS - RV V1 MAX: 44.9 CM/SEC
BH CV ECHO MEAS - RV V1 MEAN: 28.7 CM/SEC
BH CV ECHO MEAS - RV V1 VTI: 9.7 CM
BH CV ECHO MEAS - RVOT AREA: 4.9 CM^2
BH CV ECHO MEAS - RVOT DIAM: 2.5 CM
BH CV ECHO MEAS - SI(AO): 507.1 ML/M^2
BH CV ECHO MEAS - SI(CUBED): 102.4 ML/M^2
BH CV ECHO MEAS - SI(LVOT): 47.5 ML/M^2
BH CV ECHO MEAS - SI(MOD-SP2): 50.7 ML/M^2
BH CV ECHO MEAS - SI(MOD-SP4): 49.9 ML/M^2
BH CV ECHO MEAS - SI(TEICH): 64.3 ML/M^2
BH CV ECHO MEAS - SUP REN AO DIAM: 2.1 CM
BH CV ECHO MEAS - SV(AO): 1220 ML
BH CV ECHO MEAS - SV(CUBED): 246.3 ML
BH CV ECHO MEAS - SV(LVOT): 114.2 ML
BH CV ECHO MEAS - SV(MOD-SP2): 122 ML
BH CV ECHO MEAS - SV(MOD-SP4): 120 ML
BH CV ECHO MEAS - SV(RVOT): 47.6 ML
BH CV ECHO MEAS - SV(TEICH): 154.7 ML
BH CV ECHO MEAS - TAPSE (>1.6): 1.8 CM2
BH CV ECHO MEASUREMENTS AVERAGE E/E' RATIO: 4.44
BH CV XLRA - RV BASE: 3.6 CM
BH CV XLRA - TDI S': 9 CM/SEC
LEFT ATRIUM VOLUME INDEX: 20 ML/M2
MAXIMAL PREDICTED HEART RATE: 160 BPM
SINUS: 3.8 CM
STJ: 3.2 CM
STRESS TARGET HR: 136 BPM
Z-SCORE OF LEFT VENTRICULAR DIMENSION IN END SYSTOLE: 3.5

## 2018-06-20 ENCOUNTER — TRANSCRIBE ORDERS (OUTPATIENT)
Dept: CARDIOLOGY | Facility: CLINIC | Age: 63
End: 2018-06-20

## 2018-06-20 ENCOUNTER — OFFICE VISIT (OUTPATIENT)
Dept: CARDIAC SURGERY | Facility: CLINIC | Age: 63
End: 2018-06-20

## 2018-06-20 VITALS
OXYGEN SATURATION: 96 % | HEART RATE: 77 BPM | TEMPERATURE: 98.9 F | WEIGHT: 260 LBS | DIASTOLIC BLOOD PRESSURE: 83 MMHG | SYSTOLIC BLOOD PRESSURE: 143 MMHG | BODY MASS INDEX: 33.37 KG/M2 | RESPIRATION RATE: 20 BRPM | HEIGHT: 74 IN

## 2018-06-20 DIAGNOSIS — I35.0 NONRHEUMATIC AORTIC VALVE STENOSIS: Primary | ICD-10-CM

## 2018-06-20 DIAGNOSIS — Q23.1 AORTIC STENOSIS DUE TO BICUSPID AORTIC VALVE: Primary | ICD-10-CM

## 2018-06-20 DIAGNOSIS — Z01.810 PRE-OPERATIVE CARDIOVASCULAR EXAMINATION: Primary | ICD-10-CM

## 2018-06-20 DIAGNOSIS — Q23.0 AORTIC STENOSIS DUE TO BICUSPID AORTIC VALVE: Primary | ICD-10-CM

## 2018-06-20 DIAGNOSIS — I35.0 NONRHEUMATIC AORTIC VALVE STENOSIS: ICD-10-CM

## 2018-06-20 DIAGNOSIS — E66.01 OBESITY, MORBID (HCC): ICD-10-CM

## 2018-06-20 DIAGNOSIS — Z13.6 SCREENING FOR ISCHEMIC HEART DISEASE: ICD-10-CM

## 2018-06-20 PROBLEM — Q23.81 AORTIC STENOSIS DUE TO BICUSPID AORTIC VALVE: Status: ACTIVE | Noted: 2018-06-20

## 2018-06-20 PROCEDURE — 99024 POSTOP FOLLOW-UP VISIT: CPT | Performed by: THORACIC SURGERY (CARDIOTHORACIC VASCULAR SURGERY)

## 2018-06-20 NOTE — PROGRESS NOTES
He was seen in the office today.  He has aortic stenosis with a 1.2 cm valve area.  Peak velocity is 4 56 cm/s.  The peak gradient is 83 mmHg with a mean gradient of 46.1.  His most recent echocardiogram showed his ventricle is starting to enlarge and I agree is time to pull the trigger to do aortic valve replacement.  He would prefer a tissue valve.  His next step will be a cardiac catheterization and he will check with Dr. Irene and set this up.  We can do the surgery the next day if  he would like to.

## 2018-06-21 PROBLEM — I35.0 NONRHEUMATIC AORTIC VALVE STENOSIS: Status: ACTIVE | Noted: 2018-06-21

## 2018-06-25 RX ORDER — ESCITALOPRAM OXALATE 10 MG/1
TABLET ORAL
Qty: 30 TABLET | Refills: 0 | Status: SHIPPED | OUTPATIENT
Start: 2018-06-25 | End: 2018-08-17 | Stop reason: SDUPTHER

## 2018-07-03 ENCOUNTER — LAB (OUTPATIENT)
Dept: LAB | Facility: HOSPITAL | Age: 63
End: 2018-07-03

## 2018-07-03 DIAGNOSIS — Z13.6 SCREENING FOR ISCHEMIC HEART DISEASE: ICD-10-CM

## 2018-07-03 DIAGNOSIS — Z01.810 PRE-OPERATIVE CARDIOVASCULAR EXAMINATION: ICD-10-CM

## 2018-07-03 DIAGNOSIS — I35.0 NONRHEUMATIC AORTIC VALVE STENOSIS: ICD-10-CM

## 2018-07-03 LAB
ANION GAP SERPL CALCULATED.3IONS-SCNC: 13.9 MMOL/L
BASOPHILS # BLD AUTO: 0.06 10*3/MM3 (ref 0–0.2)
BASOPHILS NFR BLD AUTO: 0.8 % (ref 0–1.5)
BUN BLD-MCNC: 19 MG/DL (ref 8–23)
BUN/CREAT SERPL: 15.6 (ref 7–25)
CALCIUM SPEC-SCNC: 9 MG/DL (ref 8.6–10.5)
CHLORIDE SERPL-SCNC: 102 MMOL/L (ref 98–107)
CO2 SERPL-SCNC: 23.1 MMOL/L (ref 22–29)
CREAT BLD-MCNC: 1.22 MG/DL (ref 0.76–1.27)
DEPRECATED RDW RBC AUTO: 45 FL (ref 37–54)
EOSINOPHIL # BLD AUTO: 0.14 10*3/MM3 (ref 0–0.7)
EOSINOPHIL NFR BLD AUTO: 1.9 % (ref 0.3–6.2)
ERYTHROCYTE [DISTWIDTH] IN BLOOD BY AUTOMATED COUNT: 13.7 % (ref 11.5–14.5)
GFR SERPL CREATININE-BSD FRML MDRD: 61 ML/MIN/1.73
GLUCOSE BLD-MCNC: 87 MG/DL (ref 65–99)
HCT VFR BLD AUTO: 40.8 % (ref 40.4–52.2)
HGB BLD-MCNC: 14 G/DL (ref 13.7–17.6)
IMM GRANULOCYTES # BLD: 0.02 10*3/MM3 (ref 0–0.03)
IMM GRANULOCYTES NFR BLD: 0.3 % (ref 0–0.5)
LYMPHOCYTES # BLD AUTO: 2.62 10*3/MM3 (ref 0.9–4.8)
LYMPHOCYTES NFR BLD AUTO: 35.8 % (ref 19.6–45.3)
MCH RBC QN AUTO: 31.2 PG (ref 27–32.7)
MCHC RBC AUTO-ENTMCNC: 34.3 G/DL (ref 32.6–36.4)
MCV RBC AUTO: 90.9 FL (ref 79.8–96.2)
MONOCYTES # BLD AUTO: 0.56 10*3/MM3 (ref 0.2–1.2)
MONOCYTES NFR BLD AUTO: 7.7 % (ref 5–12)
NEUTROPHILS # BLD AUTO: 3.94 10*3/MM3 (ref 1.9–8.1)
NEUTROPHILS NFR BLD AUTO: 53.8 % (ref 42.7–76)
PLATELET # BLD AUTO: 226 10*3/MM3 (ref 140–500)
PMV BLD AUTO: 11.9 FL (ref 6–12)
POTASSIUM BLD-SCNC: 3.9 MMOL/L (ref 3.5–5.2)
RBC # BLD AUTO: 4.49 10*6/MM3 (ref 4.6–6)
SODIUM BLD-SCNC: 139 MMOL/L (ref 136–145)
WBC NRBC COR # BLD: 7.32 10*3/MM3 (ref 4.5–10.7)

## 2018-07-03 PROCEDURE — 36415 COLL VENOUS BLD VENIPUNCTURE: CPT

## 2018-07-03 PROCEDURE — 80048 BASIC METABOLIC PNL TOTAL CA: CPT

## 2018-07-03 PROCEDURE — 85025 COMPLETE CBC W/AUTO DIFF WBC: CPT

## 2018-07-09 ENCOUNTER — ANESTHESIA EVENT (OUTPATIENT)
Dept: PERIOP | Facility: HOSPITAL | Age: 63
End: 2018-07-09

## 2018-07-09 ENCOUNTER — HOSPITAL ENCOUNTER (INPATIENT)
Facility: HOSPITAL | Age: 63
LOS: 5 days | Discharge: HOME HEALTH | End: 2018-07-14
Attending: INTERNAL MEDICINE | Admitting: INTERNAL MEDICINE

## 2018-07-09 ENCOUNTER — APPOINTMENT (OUTPATIENT)
Dept: CARDIOLOGY | Facility: HOSPITAL | Age: 63
End: 2018-07-09

## 2018-07-09 ENCOUNTER — APPOINTMENT (OUTPATIENT)
Dept: GENERAL RADIOLOGY | Facility: HOSPITAL | Age: 63
End: 2018-07-09

## 2018-07-09 DIAGNOSIS — Q23.0 AORTIC STENOSIS DUE TO BICUSPID AORTIC VALVE: Primary | ICD-10-CM

## 2018-07-09 DIAGNOSIS — Q23.1 AORTIC STENOSIS DUE TO BICUSPID AORTIC VALVE: Primary | ICD-10-CM

## 2018-07-09 DIAGNOSIS — I35.0 NONRHEUMATIC AORTIC VALVE STENOSIS: ICD-10-CM

## 2018-07-09 LAB
ABO GROUP BLD: NORMAL
ABO GROUP BLD: NORMAL
ALBUMIN SERPL-MCNC: 4.1 G/DL (ref 3.5–5.2)
ALBUMIN/GLOB SERPL: 1.5 G/DL
ALP SERPL-CCNC: 56 U/L (ref 39–117)
ALT SERPL W P-5'-P-CCNC: 16 U/L (ref 1–41)
ANION GAP SERPL CALCULATED.3IONS-SCNC: 11.2 MMOL/L
APTT PPP: 26.6 SECONDS (ref 22.7–35.4)
ARTERIAL PATENCY WRIST A: POSITIVE
AST SERPL-CCNC: 16 U/L (ref 1–40)
ATMOSPHERIC PRESS: 758.3 MMHG
BASE EXCESS BLDA CALC-SCNC: 2.1 MMOL/L (ref 0–2)
BASOPHILS # BLD AUTO: 0.05 10*3/MM3 (ref 0–0.2)
BASOPHILS NFR BLD AUTO: 0.9 % (ref 0–1.5)
BDY SITE: ABNORMAL
BH CV XLRA MEAS - DIST GSV CALF DIST LEFT: 0.32 CM
BH CV XLRA MEAS - DIST GSV CALF DIST RIGHT: 0.24 CM
BH CV XLRA MEAS - DIST GSV THIGH DIST LEFT: 0.39 CM
BH CV XLRA MEAS - DIST GSV THIGH DIST RIGHT: 0.35 CM
BH CV XLRA MEAS - GSV ANKLE DIST LEFT: 0.32 CM
BH CV XLRA MEAS - GSV ANKLE DIST RIGHT: 0.25 CM
BH CV XLRA MEAS - GSV KNEE DIST LEFT: 0.31 CM
BH CV XLRA MEAS - GSV KNEE DIST RIGHT: 0.36 CM
BH CV XLRA MEAS - GSV ORIGIN DIST LEFT: 0.48 CM
BH CV XLRA MEAS - GSV ORIGIN DIST RIGHT: 0.75 CM
BH CV XLRA MEAS - MID GSV CALF LEFT: 0.28 CM
BH CV XLRA MEAS - MID GSV CALF RIGHT: 0.26 CM
BH CV XLRA MEAS - MID GSV THIGH  LEFT: 0.35 CM
BH CV XLRA MEAS - MID GSV THIGH  RIGHT: 0.37 CM
BH CV XLRA MEAS - PROX GSV CALF DIST LEFT: 0.33 CM
BH CV XLRA MEAS - PROX GSV CALF DIST RIGHT: 0.27 CM
BH CV XLRA MEAS - PROX GSV THIGH  LEFT: 0.34 CM
BH CV XLRA MEAS - PROX GSV THIGH  RIGHT: 0.38 CM
BH CV XLRA MEAS LEFT DIST CCA EDV: -18.1 CM/SEC
BH CV XLRA MEAS LEFT DIST CCA PSV: -76.2 CM/SEC
BH CV XLRA MEAS LEFT DIST ICA EDV: -24 CM/SEC
BH CV XLRA MEAS LEFT DIST ICA PSV: -57.4 CM/SEC
BH CV XLRA MEAS LEFT MID ICA EDV: -32.2 CM/SEC
BH CV XLRA MEAS LEFT MID ICA PSV: -108 CM/SEC
BH CV XLRA MEAS LEFT PROX CCA EDV: 18.1 CM/SEC
BH CV XLRA MEAS LEFT PROX CCA PSV: 101 CM/SEC
BH CV XLRA MEAS LEFT PROX ECA EDV: -20.8 CM/SEC
BH CV XLRA MEAS LEFT PROX ECA PSV: -143 CM/SEC
BH CV XLRA MEAS LEFT PROX ICA EDV: -30.6 CM/SEC
BH CV XLRA MEAS LEFT PROX ICA PSV: -79.4 CM/SEC
BH CV XLRA MEAS LEFT PROX SCLA PSV: 115 CM/SEC
BH CV XLRA MEAS LEFT VERTEBRAL A EDV: 25.5 CM/SEC
BH CV XLRA MEAS LEFT VERTEBRAL A PSV: 62.1 CM/SEC
BH CV XLRA MEAS RIGHT DIST CCA EDV: 18.8 CM/SEC
BH CV XLRA MEAS RIGHT DIST CCA PSV: 66.3 CM/SEC
BH CV XLRA MEAS RIGHT DIST ICA EDV: -28.9 CM/SEC
BH CV XLRA MEAS RIGHT DIST ICA PSV: -71.8 CM/SEC
BH CV XLRA MEAS RIGHT MID ICA EDV: -31 CM/SEC
BH CV XLRA MEAS RIGHT MID ICA PSV: -77.8 CM/SEC
BH CV XLRA MEAS RIGHT PROX CCA EDV: 19.9 CM/SEC
BH CV XLRA MEAS RIGHT PROX CCA PSV: 89.7 CM/SEC
BH CV XLRA MEAS RIGHT PROX ECA EDV: -22.3 CM/SEC
BH CV XLRA MEAS RIGHT PROX ECA PSV: -85 CM/SEC
BH CV XLRA MEAS RIGHT PROX ICA EDV: -18.5 CM/SEC
BH CV XLRA MEAS RIGHT PROX ICA PSV: -54.6 CM/SEC
BH CV XLRA MEAS RIGHT PROX SCLA PSV: 134 CM/SEC
BH CV XLRA MEAS RIGHT VERTEBRAL A EDV: -10.2 CM/SEC
BH CV XLRA MEAS RIGHT VERTEBRAL A PSV: -29.9 CM/SEC
BILIRUB SERPL-MCNC: 0.5 MG/DL (ref 0.1–1.2)
BILIRUB UR QL STRIP: NEGATIVE
BLD GP AB SCN SERPL QL: NEGATIVE
BUN BLD-MCNC: 16 MG/DL (ref 8–23)
BUN/CREAT SERPL: 14.4 (ref 7–25)
CALCIUM SPEC-SCNC: 9.1 MG/DL (ref 8.6–10.5)
CHLORIDE SERPL-SCNC: 104 MMOL/L (ref 98–107)
CHOLEST SERPL-MCNC: 174 MG/DL (ref 0–200)
CLARITY UR: CLEAR
CLOSE TME COLL+ADP + EPINEP PNL BLD: 72 % (ref 86–100)
CO2 SERPL-SCNC: 23.8 MMOL/L (ref 22–29)
COLOR UR: YELLOW
CREAT BLD-MCNC: 1.11 MG/DL (ref 0.76–1.27)
DEPRECATED RDW RBC AUTO: 45 FL (ref 37–54)
EOSINOPHIL # BLD AUTO: 0.12 10*3/MM3 (ref 0–0.7)
EOSINOPHIL NFR BLD AUTO: 2.1 % (ref 0.3–6.2)
ERYTHROCYTE [DISTWIDTH] IN BLOOD BY AUTOMATED COUNT: 13.7 % (ref 11.5–14.5)
GFR SERPL CREATININE-BSD FRML MDRD: 68 ML/MIN/1.73
GLOBULIN UR ELPH-MCNC: 2.7 GM/DL
GLUCOSE BLD-MCNC: 99 MG/DL (ref 65–99)
GLUCOSE UR STRIP-MCNC: NEGATIVE MG/DL
HBA1C MFR BLD: 4.95 % (ref 4.8–5.6)
HCO3 BLDA-SCNC: 27 MMOL/L (ref 22–28)
HCT VFR BLD AUTO: 39.5 % (ref 40.4–52.2)
HCT VFR BLDA CALC: 37 % (ref 38–51)
HCT VFR BLDA CALC: 38 % (ref 38–51)
HDLC SERPL-MCNC: 36 MG/DL (ref 40–60)
HGB BLD-MCNC: 13 G/DL (ref 13.7–17.6)
HGB BLDA-MCNC: 12.6 G/DL (ref 12–17)
HGB BLDA-MCNC: 12.9 G/DL (ref 12–17)
HGB UR QL STRIP.AUTO: NEGATIVE
IMM GRANULOCYTES # BLD: 0.02 10*3/MM3 (ref 0–0.03)
IMM GRANULOCYTES NFR BLD: 0.3 % (ref 0–0.5)
INR PPP: 1.04 (ref 0.9–1.1)
KETONES UR QL STRIP: NEGATIVE
LDLC SERPL CALC-MCNC: 102 MG/DL (ref 0–100)
LDLC/HDLC SERPL: 2.84 {RATIO}
LEFT ARM BP: NORMAL MMHG
LEUKOCYTE ESTERASE UR QL STRIP.AUTO: NEGATIVE
LYMPHOCYTES # BLD AUTO: 2.03 10*3/MM3 (ref 0.9–4.8)
LYMPHOCYTES NFR BLD AUTO: 34.7 % (ref 19.6–45.3)
MAGNESIUM SERPL-MCNC: 2.3 MG/DL (ref 1.6–2.4)
MCH RBC QN AUTO: 29.9 PG (ref 27–32.7)
MCHC RBC AUTO-ENTMCNC: 32.9 G/DL (ref 32.6–36.4)
MCV RBC AUTO: 90.8 FL (ref 79.8–96.2)
MODALITY: ABNORMAL
MONOCYTES # BLD AUTO: 0.52 10*3/MM3 (ref 0.2–1.2)
MONOCYTES NFR BLD AUTO: 8.9 % (ref 5–12)
NEUTROPHILS # BLD AUTO: 3.13 10*3/MM3 (ref 1.9–8.1)
NEUTROPHILS NFR BLD AUTO: 53.4 % (ref 42.7–76)
NITRITE UR QL STRIP: NEGATIVE
NT-PROBNP SERPL-MCNC: 225.9 PG/ML (ref 0–900)
PCO2 BLDA: 42 MM HG (ref 35–45)
PH BLDA: 7.42 PH UNITS (ref 7.35–7.45)
PH UR STRIP.AUTO: 5.5 [PH] (ref 5–8)
PLATELET # BLD AUTO: 219 10*3/MM3 (ref 140–500)
PMV BLD AUTO: 11.9 FL (ref 6–12)
PO2 BLDA: 87.8 MM HG (ref 80–100)
POTASSIUM BLD-SCNC: 3.8 MMOL/L (ref 3.5–5.2)
PROT SERPL-MCNC: 6.8 G/DL (ref 6–8.5)
PROT UR QL STRIP: NEGATIVE
PROTHROMBIN TIME: 13.4 SECONDS (ref 11.7–14.2)
RBC # BLD AUTO: 4.35 10*6/MM3 (ref 4.6–6)
RH BLD: POSITIVE
RH BLD: POSITIVE
SAO2 % BLDA: 69 % (ref 95–98)
SAO2 % BLDA: 95 % (ref 95–98)
SAO2 % BLDCOA: 96.8 % (ref 92–99)
SODIUM BLD-SCNC: 139 MMOL/L (ref 136–145)
SP GR UR STRIP: 1.02 (ref 1–1.03)
T&S EXPIRATION DATE: NORMAL
TOTAL RATE: 16 BREATHS/MINUTE
TRIGL SERPL-MCNC: 178 MG/DL (ref 0–150)
UROBILINOGEN UR QL STRIP: NORMAL
VLDLC SERPL-MCNC: 35.6 MG/DL (ref 5–40)
WBC NRBC COR # BLD: 5.85 10*3/MM3 (ref 4.5–10.7)

## 2018-07-09 PROCEDURE — 99253 IP/OBS CNSLTJ NEW/EST LOW 45: CPT | Performed by: THORACIC SURGERY (CARDIOTHORACIC VASCULAR SURGERY)

## 2018-07-09 PROCEDURE — C1894 INTRO/SHEATH, NON-LASER: HCPCS | Performed by: INTERNAL MEDICINE

## 2018-07-09 PROCEDURE — B2161ZZ FLUOROSCOPY OF RIGHT AND LEFT HEART USING LOW OSMOLAR CONTRAST: ICD-10-PCS | Performed by: INTERNAL MEDICINE

## 2018-07-09 PROCEDURE — 82803 BLOOD GASES ANY COMBINATION: CPT

## 2018-07-09 PROCEDURE — 99152 MOD SED SAME PHYS/QHP 5/>YRS: CPT | Performed by: INTERNAL MEDICINE

## 2018-07-09 PROCEDURE — 86850 RBC ANTIBODY SCREEN: CPT | Performed by: THORACIC SURGERY (CARDIOTHORACIC VASCULAR SURGERY)

## 2018-07-09 PROCEDURE — 80053 COMPREHEN METABOLIC PANEL: CPT | Performed by: THORACIC SURGERY (CARDIOTHORACIC VASCULAR SURGERY)

## 2018-07-09 PROCEDURE — 83036 HEMOGLOBIN GLYCOSYLATED A1C: CPT | Performed by: THORACIC SURGERY (CARDIOTHORACIC VASCULAR SURGERY)

## 2018-07-09 PROCEDURE — 86900 BLOOD TYPING SEROLOGIC ABO: CPT

## 2018-07-09 PROCEDURE — 81003 URINALYSIS AUTO W/O SCOPE: CPT | Performed by: THORACIC SURGERY (CARDIOTHORACIC VASCULAR SURGERY)

## 2018-07-09 PROCEDURE — 85018 HEMOGLOBIN: CPT

## 2018-07-09 PROCEDURE — 80061 LIPID PANEL: CPT | Performed by: THORACIC SURGERY (CARDIOTHORACIC VASCULAR SURGERY)

## 2018-07-09 PROCEDURE — 4A023N8 MEASUREMENT OF CARDIAC SAMPLING AND PRESSURE, BILATERAL, PERCUTANEOUS APPROACH: ICD-10-PCS | Performed by: INTERNAL MEDICINE

## 2018-07-09 PROCEDURE — 36600 WITHDRAWAL OF ARTERIAL BLOOD: CPT

## 2018-07-09 PROCEDURE — 93970 EXTREMITY STUDY: CPT

## 2018-07-09 PROCEDURE — 25010000002 FENTANYL CITRATE (PF) 100 MCG/2ML SOLUTION: Performed by: INTERNAL MEDICINE

## 2018-07-09 PROCEDURE — 83735 ASSAY OF MAGNESIUM: CPT | Performed by: THORACIC SURGERY (CARDIOTHORACIC VASCULAR SURGERY)

## 2018-07-09 PROCEDURE — 93456 R HRT CORONARY ARTERY ANGIO: CPT | Performed by: INTERNAL MEDICINE

## 2018-07-09 PROCEDURE — 83880 ASSAY OF NATRIURETIC PEPTIDE: CPT | Performed by: THORACIC SURGERY (CARDIOTHORACIC VASCULAR SURGERY)

## 2018-07-09 PROCEDURE — 25010000002 MIDAZOLAM PER 1 MG: Performed by: INTERNAL MEDICINE

## 2018-07-09 PROCEDURE — B3101ZZ FLUOROSCOPY OF THORACIC AORTA USING LOW OSMOLAR CONTRAST: ICD-10-PCS | Performed by: INTERNAL MEDICINE

## 2018-07-09 PROCEDURE — 85730 THROMBOPLASTIN TIME PARTIAL: CPT | Performed by: THORACIC SURGERY (CARDIOTHORACIC VASCULAR SURGERY)

## 2018-07-09 PROCEDURE — 25010000002 HEPARIN (PORCINE) PER 1000 UNITS: Performed by: INTERNAL MEDICINE

## 2018-07-09 PROCEDURE — 86900 BLOOD TYPING SEROLOGIC ABO: CPT | Performed by: THORACIC SURGERY (CARDIOTHORACIC VASCULAR SURGERY)

## 2018-07-09 PROCEDURE — 0 IOPAMIDOL PER 1 ML: Performed by: INTERNAL MEDICINE

## 2018-07-09 PROCEDURE — 85576 BLOOD PLATELET AGGREGATION: CPT | Performed by: THORACIC SURGERY (CARDIOTHORACIC VASCULAR SURGERY)

## 2018-07-09 PROCEDURE — C1769 GUIDE WIRE: HCPCS | Performed by: INTERNAL MEDICINE

## 2018-07-09 PROCEDURE — 71046 X-RAY EXAM CHEST 2 VIEWS: CPT

## 2018-07-09 PROCEDURE — 86901 BLOOD TYPING SEROLOGIC RH(D): CPT

## 2018-07-09 PROCEDURE — 86901 BLOOD TYPING SEROLOGIC RH(D): CPT | Performed by: THORACIC SURGERY (CARDIOTHORACIC VASCULAR SURGERY)

## 2018-07-09 PROCEDURE — 93880 EXTRACRANIAL BILAT STUDY: CPT

## 2018-07-09 PROCEDURE — B2111ZZ FLUOROSCOPY OF MULTIPLE CORONARY ARTERIES USING LOW OSMOLAR CONTRAST: ICD-10-PCS | Performed by: INTERNAL MEDICINE

## 2018-07-09 PROCEDURE — 86923 COMPATIBILITY TEST ELECTRIC: CPT

## 2018-07-09 PROCEDURE — 85610 PROTHROMBIN TIME: CPT | Performed by: THORACIC SURGERY (CARDIOTHORACIC VASCULAR SURGERY)

## 2018-07-09 PROCEDURE — 85025 COMPLETE CBC W/AUTO DIFF WBC: CPT | Performed by: THORACIC SURGERY (CARDIOTHORACIC VASCULAR SURGERY)

## 2018-07-09 PROCEDURE — 85014 HEMATOCRIT: CPT

## 2018-07-09 RX ORDER — ALPRAZOLAM 0.25 MG/1
0.25 TABLET ORAL EVERY 8 HOURS PRN
Status: DISCONTINUED | OUTPATIENT
Start: 2018-07-09 | End: 2018-07-10

## 2018-07-09 RX ORDER — FENTANYL CITRATE 50 UG/ML
INJECTION, SOLUTION INTRAMUSCULAR; INTRAVENOUS AS NEEDED
Status: DISCONTINUED | OUTPATIENT
Start: 2018-07-09 | End: 2018-07-09 | Stop reason: HOSPADM

## 2018-07-09 RX ORDER — HYDROCODONE BITARTRATE AND ACETAMINOPHEN 5; 325 MG/1; MG/1
1 TABLET ORAL EVERY 4 HOURS PRN
Status: DISCONTINUED | OUTPATIENT
Start: 2018-07-09 | End: 2018-07-10

## 2018-07-09 RX ORDER — ACETAMINOPHEN 325 MG/1
650 TABLET ORAL EVERY 4 HOURS PRN
Status: DISCONTINUED | OUTPATIENT
Start: 2018-07-09 | End: 2018-07-10

## 2018-07-09 RX ORDER — ONDANSETRON 2 MG/ML
4 INJECTION INTRAMUSCULAR; INTRAVENOUS EVERY 6 HOURS PRN
Status: DISCONTINUED | OUTPATIENT
Start: 2018-07-09 | End: 2018-07-10

## 2018-07-09 RX ORDER — SODIUM CHLORIDE 9 MG/ML
75 INJECTION, SOLUTION INTRAVENOUS CONTINUOUS
Status: DISCONTINUED | OUTPATIENT
Start: 2018-07-09 | End: 2018-07-09

## 2018-07-09 RX ORDER — ONDANSETRON 4 MG/1
4 TABLET, FILM COATED ORAL EVERY 6 HOURS PRN
Status: DISCONTINUED | OUTPATIENT
Start: 2018-07-09 | End: 2018-07-10

## 2018-07-09 RX ORDER — MIDAZOLAM HYDROCHLORIDE 1 MG/ML
INJECTION INTRAMUSCULAR; INTRAVENOUS AS NEEDED
Status: DISCONTINUED | OUTPATIENT
Start: 2018-07-09 | End: 2018-07-09 | Stop reason: HOSPADM

## 2018-07-09 RX ORDER — NITROGLYCERIN 0.4 MG/1
0.4 TABLET SUBLINGUAL
Status: DISCONTINUED | OUTPATIENT
Start: 2018-07-09 | End: 2018-07-10

## 2018-07-09 RX ORDER — TEMAZEPAM 15 MG/1
15 CAPSULE ORAL NIGHTLY PRN
Status: DISCONTINUED | OUTPATIENT
Start: 2018-07-09 | End: 2018-07-10

## 2018-07-09 RX ORDER — LIDOCAINE HYDROCHLORIDE 10 MG/ML
0.1 INJECTION, SOLUTION EPIDURAL; INFILTRATION; INTRACAUDAL; PERINEURAL ONCE AS NEEDED
Status: DISCONTINUED | OUTPATIENT
Start: 2018-07-09 | End: 2018-07-09 | Stop reason: HOSPADM

## 2018-07-09 RX ORDER — LIDOCAINE HYDROCHLORIDE 20 MG/ML
INJECTION, SOLUTION INFILTRATION; PERINEURAL AS NEEDED
Status: DISCONTINUED | OUTPATIENT
Start: 2018-07-09 | End: 2018-07-09 | Stop reason: HOSPADM

## 2018-07-09 RX ORDER — ONDANSETRON 4 MG/1
4 TABLET, ORALLY DISINTEGRATING ORAL EVERY 6 HOURS PRN
Status: DISCONTINUED | OUTPATIENT
Start: 2018-07-09 | End: 2018-07-10

## 2018-07-09 RX ORDER — ESCITALOPRAM OXALATE 10 MG/1
10 TABLET ORAL DAILY
Status: DISCONTINUED | OUTPATIENT
Start: 2018-07-09 | End: 2018-07-10

## 2018-07-09 RX ORDER — FAMOTIDINE 10 MG/ML
20 INJECTION, SOLUTION INTRAVENOUS
Status: CANCELLED | OUTPATIENT
Start: 2018-07-10 | End: 2018-07-11

## 2018-07-09 RX ORDER — CHLORHEXIDINE GLUCONATE 0.12 MG/ML
15 RINSE ORAL ONCE
Status: COMPLETED | OUTPATIENT
Start: 2018-07-10 | End: 2018-07-10

## 2018-07-09 RX ORDER — SODIUM CHLORIDE 0.9 % (FLUSH) 0.9 %
1-10 SYRINGE (ML) INJECTION AS NEEDED
Status: DISCONTINUED | OUTPATIENT
Start: 2018-07-09 | End: 2018-07-09 | Stop reason: HOSPADM

## 2018-07-09 RX ORDER — LORAZEPAM 1 MG/1
1 TABLET ORAL
Status: CANCELLED | OUTPATIENT
Start: 2018-07-10 | End: 2018-07-20

## 2018-07-09 RX ORDER — CHLORHEXIDINE GLUCONATE 500 MG/1
1 CLOTH TOPICAL EVERY 12 HOURS PRN
Status: DISCONTINUED | OUTPATIENT
Start: 2018-07-09 | End: 2018-07-10 | Stop reason: HOSPADM

## 2018-07-09 RX ORDER — ASPIRIN 81 MG/1
81 TABLET, CHEWABLE ORAL DAILY
Status: DISCONTINUED | OUTPATIENT
Start: 2018-07-09 | End: 2018-07-10

## 2018-07-09 RX ORDER — DIPHENHYDRAMINE HCL 25 MG
25 CAPSULE ORAL NIGHTLY PRN
Status: DISCONTINUED | OUTPATIENT
Start: 2018-07-09 | End: 2018-07-10

## 2018-07-09 RX ORDER — CHLORHEXIDINE GLUCONATE 500 MG/1
1 CLOTH TOPICAL EVERY 12 HOURS
Status: DISCONTINUED | OUTPATIENT
Start: 2018-07-09 | End: 2018-07-10

## 2018-07-09 RX ORDER — ATORVASTATIN CALCIUM 10 MG/1
10 TABLET, FILM COATED ORAL DAILY
Status: DISCONTINUED | OUTPATIENT
Start: 2018-07-09 | End: 2018-07-10

## 2018-07-09 RX ORDER — CEFAZOLIN SODIUM 2 G/100ML
2 INJECTION, SOLUTION INTRAVENOUS
Status: COMPLETED | OUTPATIENT
Start: 2018-07-10 | End: 2018-07-10

## 2018-07-09 RX ORDER — CHLORHEXIDINE GLUCONATE 0.12 MG/ML
15 RINSE ORAL EVERY 12 HOURS SCHEDULED
Status: DISCONTINUED | OUTPATIENT
Start: 2018-07-09 | End: 2018-07-10

## 2018-07-09 RX ORDER — BUTALBITAL, ACETAMINOPHEN AND CAFFEINE 50; 325; 40 MG/1; MG/1; MG/1
1 TABLET ORAL EVERY 6 HOURS PRN
Status: DISCONTINUED | OUTPATIENT
Start: 2018-07-09 | End: 2018-07-10

## 2018-07-09 RX ADMIN — CHLORHEXIDINE GLUCONATE 1 APPLICATION: 500 CLOTH TOPICAL at 22:23

## 2018-07-09 RX ADMIN — HYDROCHLOROTHIAZIDE: 25 TABLET ORAL at 14:06

## 2018-07-09 RX ADMIN — ESCITALOPRAM 10 MG: 10 TABLET, FILM COATED ORAL at 22:22

## 2018-07-09 RX ADMIN — ASPIRIN 81 MG: 81 TABLET, CHEWABLE ORAL at 15:56

## 2018-07-09 RX ADMIN — MUPIROCIN 1 APPLICATION: 20 OINTMENT TOPICAL at 22:21

## 2018-07-09 RX ADMIN — SODIUM CHLORIDE 75 ML/HR: 9 INJECTION, SOLUTION INTRAVENOUS at 10:00

## 2018-07-09 RX ADMIN — CHLORHEXIDINE GLUCONATE 15 ML: 1.2 RINSE ORAL at 22:22

## 2018-07-09 RX ADMIN — SODIUM CHLORIDE 75 ML/HR: 9 INJECTION, SOLUTION INTRAVENOUS at 07:24

## 2018-07-09 RX ADMIN — TEMAZEPAM 15 MG: 15 CAPSULE ORAL at 22:22

## 2018-07-09 NOTE — PLAN OF CARE
Problem: Patient Care Overview  Goal: Plan of Care Review  Outcome: Ongoing (interventions implemented as appropriate)   07/09/18 1544   Coping/Psychosocial   Plan of Care Reviewed With patient   Plan of Care Review   Progress no change   OTHER   Outcome Summary R radial site, C,D,I and soft. to have open heart in am. Will continue to monitor.      Goal: Individualization and Mutuality  Outcome: Ongoing (interventions implemented as appropriate)    Goal: Discharge Needs Assessment  Outcome: Ongoing (interventions implemented as appropriate)    Goal: Interprofessional Rounds/Family Conf  Outcome: Ongoing (interventions implemented as appropriate)      Problem: Cardiac Catheterization (Diagnostic/Interventional) (Adult)  Goal: Signs and Symptoms of Listed Potential Problems Will be Absent, Minimized or Managed (Cardiac Catheterization)  Outcome: Ongoing (interventions implemented as appropriate)    Goal: Anesthesia/Sedation Recovery  Outcome: Ongoing (interventions implemented as appropriate)

## 2018-07-09 NOTE — CONSULTS
Patient Care Team:  Milvia Briceno MD as PCP - General    Chief complaint: aortic stenosis     Subjective     History of Present Illness  Mr. Tolliver is a pleasant 62 year male with known aortic stenosis, who was last seen by Dr. Vidales in June, I am meeting him for the first time.  He states he is fairly asymptomatic with his aortic stenosis however his left ventricle is starting to enlarge so Dr. Vidales's recommendation was for aortic valve replacement.  He underwent a cardiac cath today which revealed single vessel CAD.      Review of Systems   Constitutional: Negative.    Eyes: Negative.    Respiratory: Negative.    Cardiovascular: Negative.    Gastrointestinal: Positive for diarrhea.   Endocrine: Negative.    Genitourinary: Negative.    Musculoskeletal: Negative.    Allergic/Immunologic: Negative.    Neurological: Positive for headaches.   Hematological: Negative.    Psychiatric/Behavioral: Negative.         Past Medical History:   Diagnosis Date   • Aortic valve calcification    • Benign essential hypertension    • Broken nose    • Heart murmur    • Hemolytic anemia (CMS/HCC)    • Hyperlipidemia    • Migraine    • Mild mitral regurgitation    • Pulmonic regurgitation     Trace   • Reactive depression (situational)    • Skin lesion     Suspecious; L Arm x2 Wks   • Tricuspid regurgitation     Trace     Past Surgical History:   Procedure Laterality Date   • COLECTOMY PARTIAL / TOTAL     • OTHER SURGICAL HISTORY      [place] Vaginalis Excision of Hydrocele Left   • TONSILLECTOMY       Family History   Problem Relation Age of Onset   • Anemia Father         Hemolytic   • Stroke Paternal Grandfather      Social History   Substance Use Topics   • Smoking status: Never Smoker   • Smokeless tobacco: Never Used   • Alcohol use 3.6 oz/week     6 Cans of beer per week      Comment: Caffeine Use     Prescriptions Prior to Admission   Medication Sig Dispense Refill Last Dose   • aspirin 81 MG tablet Take by mouth.    "7/9/2018 at Unknown time   • escitalopram (LEXAPRO) 10 MG tablet TAKE 1 TABLET BY MOUTH DAILY 30 tablet 0 7/9/2018 at Unknown time   • Glucosamine-Chondroit-Vit C-Mn (GLUCOSAMINE CHONDR 1500 COMPLX PO) Take by mouth.   7/8/2018 at Unknown time   • Melatonin 5 MG capsule Take by mouth.   7/8/2018 at Unknown time   • Multiple Vitamin (MULTIVITAMINS PO) Take by mouth.   7/9/2018 at Unknown time   • Probiotic Product (PROBIOTIC PO) Take  by mouth.   7/8/2018 at Unknown time   • simvastatin (ZOCOR) 10 MG tablet TAKE 1 TABLET BY MOUTH EVERY NIGHT 90 tablet 0 7/9/2018 at Unknown time   • valsartan-hydrochlorothiazide (DIOVAN-HCT) 80-12.5 MG per tablet Take 1 tablet by mouth Daily. 90 tablet 1 7/8/2018 at Unknown time   • butalbital-acetaminophen-caffeine (FIORICET, ESGIC) -40 MG per tablet Take 1 tablet by mouth Every 6 (Six) Hours As Needed for Headache. 25 tablet 1 Unknown at Unknown time        Allergies:  Patient has no known allergies.    Objective      Vital Signs  Temp:  [98.1 °F (36.7 °C)] 98.1 °F (36.7 °C)  Heart Rate:  [59-69] 68  Resp:  [16-20] 20  BP: (131-182)/() 134/108    Flowsheet Rows      First Filed Value   Admission Height  188 cm (74\") Documented at 07/09/2018 0730   Admission Weight  115 kg (254 lb 9 oz) Documented at 07/09/2018 0730        188 cm (74\")    Physical Exam   Constitutional: He is oriented to person, place, and time. He appears well-developed and well-nourished. No distress.   HENT:   Head: Normocephalic and atraumatic.   Nose: Nose normal.   Mouth/Throat: Oropharynx is clear and moist. No oropharyngeal exudate.   Eyes: Conjunctivae and EOM are normal. Pupils are equal, round, and reactive to light. No scleral icterus.   Neck: Normal range of motion. Neck supple. No JVD present.   Cardiovascular: Normal rate and regular rhythm.  Frequent extrasystoles are present.   Murmur heard.   Systolic murmur is present with a grade of 3/6   Pulmonary/Chest: Effort normal and breath " sounds normal. No respiratory distress. He has no rales.   Abdominal: Soft. Bowel sounds are normal. He exhibits no distension.   Genitourinary:   Genitourinary Comments: No ramirez   Musculoskeletal: Normal range of motion. He exhibits no edema.   Neurological: He is alert and oriented to person, place, and time. No cranial nerve deficit.   Skin: Skin is warm and dry. He is not diaphoretic. No erythema.   Psychiatric: He has a normal mood and affect. His behavior is normal. Judgment and thought content normal.       Results Review:   Lab Results (last 24 hours)     ** No results found for the last 24 hours. **              Assessment/Plan     Principal Problem:    Nonrheumatic aortic valve stenosis      Assessment & Plan     -Severe aortic stenosis  -Single vessel CAD  -LV hypertrophy, preserved function EF 65%  -Hypertension  -Depression- on lexapro  -Migraine   -Hyperlipidemia     Will order preop labs/testing    Plan for OR with Dr. Vidales tomorrow.      MITCHELL Solis  07/09/18  9:28 AM

## 2018-07-09 NOTE — INTERVAL H&P NOTE
H&P updated. The patient was examined and the following changes are noted:  evaluated by CT surgery who recommended proceeding with aortic valve replacement.  For pre-op cardiac catheterization today.

## 2018-07-09 NOTE — DISCHARGE INSTRUCTIONS
[facility]  [address] [place] [address]    Coronary Angiogram (Radial/Ulnar Approach) After Care    Refer to this sheet in the next few weeks. These instructions provide you with information on caring for yourself after your procedure. Your caregiver may also give you more specific instructions. Your treatment has been planned according to current medical practices, but problems sometimes occur. Call your caregiver if you have any problems or questions after your procedure.    Home Care Instructions:  · You may shower the day after the procedure. Remove the bandage (dressing) and gently wash the site with plain soap and water. Gently pat the site dry. You may apply a band aid daily for 2 days if desired.    · Do not apply powder or lotion to the site.  · Do not submerge the affected site in water for 3 to 5 days or until the site is completely healed.   · Do not lift, push or pull anything over 10 pounds for 2 days after your procedure.  · Inspect the site at least twice daily. You may notice some bruising at the site and it may be tender for 1 to 2 weeks.     · Increase your fluid intake for the next 2 days.    · Keep arm elevated for 24 hours. For the remainder of the day, keep your arm in “Pledge of Allegiance” position when up and about.     · You may drive 24 hours after the procedure unless otherwise instructed by your caregiver.  · Do not operate machinery or power tools for 24 hours.  · A responsible adult should be with you for the first 24 hours after you arrive home. Do not make any important legal decisions or sign legal papers for 24 hours.  Do not drink alcohol for 24 hours.    · Metformin or any medications containing Metformin should not be taken for 48 hours after your procedure.      Call Your Doctor if:   · You have unusual pain at the radial/ulnar (wrist) site.  · You have redness, warmth, swelling, or pain at the radial/ulnar (wrist) site.  · You have drainage (other  than a small amount of blood on the dressing).  · You have chills or a fever > 101.  · Your arm becomes pale or dark, cool, tingly, or numb.  · You have heavy bleeding from the site, hold pressure on the site for 20 minutes.  If the bleeding stops, apply a fresh bandage and call your cardiologist.  However, if you continue to have bleeding, call 911.

## 2018-07-10 ENCOUNTER — ANCILLARY PROCEDURE (OUTPATIENT)
Dept: PERIOP | Facility: HOSPITAL | Age: 63
End: 2018-07-10
Attending: ANESTHESIOLOGY

## 2018-07-10 ENCOUNTER — ANESTHESIA (OUTPATIENT)
Dept: PERIOP | Facility: HOSPITAL | Age: 63
End: 2018-07-10

## 2018-07-10 ENCOUNTER — APPOINTMENT (OUTPATIENT)
Dept: GENERAL RADIOLOGY | Facility: HOSPITAL | Age: 63
End: 2018-07-10

## 2018-07-10 LAB
ACT BLD: 131 SECONDS (ref 82–152)
ACT BLD: 340 SECONDS (ref 82–152)
ACT BLD: 345 SECONDS (ref 82–152)
ACT BLD: 499 SECONDS (ref 82–152)
ALBUMIN SERPL-MCNC: 4.1 G/DL (ref 3.5–5.2)
ALBUMIN SERPL-MCNC: 4.4 G/DL (ref 3.5–5.2)
ANION GAP SERPL CALCULATED.3IONS-SCNC: 13.6 MMOL/L
ANION GAP SERPL CALCULATED.3IONS-SCNC: 14.5 MMOL/L
ANION GAP SERPL CALCULATED.3IONS-SCNC: 15.1 MMOL/L
APTT PPP: 32.5 SECONDS (ref 22.7–35.4)
ARTERIAL PATENCY WRIST A: ABNORMAL
ARTERIAL PATENCY WRIST A: ABNORMAL
ATMOSPHERIC PRESS: 753.9 MMHG
ATMOSPHERIC PRESS: 754.2 MMHG
BASE EXCESS BLDA CALC-SCNC: 0.2 MMOL/L (ref 0–2)
BASE EXCESS BLDA CALC-SCNC: 1.9 MMOL/L (ref 0–2)
BASE EXCESS BLDA CALC-SCNC: 3 MMOL/L (ref -5–5)
BASOPHILS # BLD AUTO: 0.03 10*3/MM3 (ref 0–0.2)
BASOPHILS # BLD AUTO: 0.06 10*3/MM3 (ref 0–0.2)
BASOPHILS NFR BLD AUTO: 0.2 % (ref 0–1.5)
BASOPHILS NFR BLD AUTO: 0.8 % (ref 0–1.5)
BDY SITE: ABNORMAL
BDY SITE: ABNORMAL
BUN BLD-MCNC: 12 MG/DL (ref 8–23)
BUN BLD-MCNC: 13 MG/DL (ref 8–23)
BUN BLD-MCNC: 15 MG/DL (ref 8–23)
BUN/CREAT SERPL: 10.7 (ref 7–25)
BUN/CREAT SERPL: 11.1 (ref 7–25)
BUN/CREAT SERPL: 13.8 (ref 7–25)
CA-I BLD-MCNC: 5 MG/DL (ref 4.6–5.4)
CA-I SERPL ISE-MCNC: 1.24 MMOL/L (ref 1.15–1.35)
CALCIUM SPEC-SCNC: 8.5 MG/DL (ref 8.6–10.5)
CALCIUM SPEC-SCNC: 8.8 MG/DL (ref 8.6–10.5)
CALCIUM SPEC-SCNC: 9 MG/DL (ref 8.6–10.5)
CHLORIDE SERPL-SCNC: 101 MMOL/L (ref 98–107)
CHLORIDE SERPL-SCNC: 102 MMOL/L (ref 98–107)
CHLORIDE SERPL-SCNC: 103 MMOL/L (ref 98–107)
CO2 BLDA-SCNC: 29 MMOL/L (ref 24–29)
CO2 SERPL-SCNC: 21.9 MMOL/L (ref 22–29)
CO2 SERPL-SCNC: 23.4 MMOL/L (ref 22–29)
CO2 SERPL-SCNC: 23.5 MMOL/L (ref 22–29)
CREAT BLD-MCNC: 1.08 MG/DL (ref 0.76–1.27)
CREAT BLD-MCNC: 1.09 MG/DL (ref 0.76–1.27)
CREAT BLD-MCNC: 1.21 MG/DL (ref 0.76–1.27)
DEPRECATED RDW RBC AUTO: 44.5 FL (ref 37–54)
DEPRECATED RDW RBC AUTO: 44.7 FL (ref 37–54)
DEPRECATED RDW RBC AUTO: 46.1 FL (ref 37–54)
EOSINOPHIL # BLD AUTO: 0.09 10*3/MM3 (ref 0–0.7)
EOSINOPHIL # BLD AUTO: 0.2 10*3/MM3 (ref 0–0.7)
EOSINOPHIL NFR BLD AUTO: 0.7 % (ref 0.3–6.2)
EOSINOPHIL NFR BLD AUTO: 2.5 % (ref 0.3–6.2)
ERYTHROCYTE [DISTWIDTH] IN BLOOD BY AUTOMATED COUNT: 13.6 % (ref 11.5–14.5)
ERYTHROCYTE [DISTWIDTH] IN BLOOD BY AUTOMATED COUNT: 13.6 % (ref 11.5–14.5)
ERYTHROCYTE [DISTWIDTH] IN BLOOD BY AUTOMATED COUNT: 13.7 % (ref 11.5–14.5)
FIBRINOGEN PPP-MCNC: 275 MG/DL (ref 219–464)
GFR SERPL CREATININE-BSD FRML MDRD: 61 ML/MIN/1.73
GFR SERPL CREATININE-BSD FRML MDRD: 69 ML/MIN/1.73
GFR SERPL CREATININE-BSD FRML MDRD: 70 ML/MIN/1.73
GLUCOSE BLD-MCNC: 125 MG/DL (ref 65–99)
GLUCOSE BLD-MCNC: 141 MG/DL (ref 65–99)
GLUCOSE BLD-MCNC: 93 MG/DL (ref 65–99)
GLUCOSE BLDC GLUCOMTR-MCNC: 123 MG/DL (ref 70–130)
GLUCOSE BLDC GLUCOMTR-MCNC: 127 MG/DL (ref 70–130)
GLUCOSE BLDC GLUCOMTR-MCNC: 128 MG/DL (ref 70–130)
GLUCOSE BLDC GLUCOMTR-MCNC: 131 MG/DL (ref 70–130)
GLUCOSE BLDC GLUCOMTR-MCNC: 133 MG/DL (ref 70–130)
GLUCOSE BLDC GLUCOMTR-MCNC: 133 MG/DL (ref 70–130)
GLUCOSE BLDC GLUCOMTR-MCNC: 135 MG/DL (ref 70–130)
GLUCOSE BLDC GLUCOMTR-MCNC: 146 MG/DL (ref 70–130)
GLUCOSE BLDC GLUCOMTR-MCNC: 150 MG/DL (ref 70–130)
GLUCOSE BLDC GLUCOMTR-MCNC: 153 MG/DL (ref 70–130)
GLUCOSE BLDC GLUCOMTR-MCNC: 154 MG/DL (ref 70–130)
HCO3 BLDA-SCNC: 24.6 MMOL/L (ref 22–28)
HCO3 BLDA-SCNC: 24.8 MMOL/L (ref 22–28)
HCO3 BLDA-SCNC: 27.9 MMOL/L (ref 22–26)
HCT VFR BLD AUTO: 32.6 % (ref 40.4–52.2)
HCT VFR BLD AUTO: 34.4 % (ref 40.4–52.2)
HCT VFR BLD AUTO: 42.6 % (ref 40.4–52.2)
HCT VFR BLDA CALC: 30 % (ref 38–51)
HGB BLD-MCNC: 11 G/DL (ref 13.7–17.6)
HGB BLD-MCNC: 11.9 G/DL (ref 13.7–17.6)
HGB BLD-MCNC: 13.8 G/DL (ref 13.7–17.6)
HGB BLDA-MCNC: 10.2 G/DL (ref 12–17)
HOROWITZ INDEX BLD+IHG-RTO: 100 %
HOROWITZ INDEX BLD+IHG-RTO: 40 %
IMM GRANULOCYTES # BLD: 0 10*3/MM3 (ref 0–0.03)
IMM GRANULOCYTES # BLD: 0.03 10*3/MM3 (ref 0–0.03)
IMM GRANULOCYTES NFR BLD: 0 % (ref 0–0.5)
IMM GRANULOCYTES NFR BLD: 0.2 % (ref 0–0.5)
INR PPP: 1.35 (ref 0.9–1.1)
LYMPHOCYTES # BLD AUTO: 1.92 10*3/MM3 (ref 0.9–4.8)
LYMPHOCYTES # BLD AUTO: 2.46 10*3/MM3 (ref 0.9–4.8)
LYMPHOCYTES NFR BLD AUTO: 16 % (ref 19.6–45.3)
LYMPHOCYTES NFR BLD AUTO: 31.3 % (ref 19.6–45.3)
MAGNESIUM SERPL-MCNC: 2.5 MG/DL (ref 1.6–2.4)
MAGNESIUM SERPL-MCNC: 2.7 MG/DL (ref 1.6–2.4)
MAGNESIUM SERPL-MCNC: 3 MG/DL (ref 1.6–2.4)
MCH RBC QN AUTO: 30.1 PG (ref 27–32.7)
MCH RBC QN AUTO: 30.2 PG (ref 27–32.7)
MCH RBC QN AUTO: 30.7 PG (ref 27–32.7)
MCHC RBC AUTO-ENTMCNC: 32.4 G/DL (ref 32.6–36.4)
MCHC RBC AUTO-ENTMCNC: 33.7 G/DL (ref 32.6–36.4)
MCHC RBC AUTO-ENTMCNC: 34.6 G/DL (ref 32.6–36.4)
MCV RBC AUTO: 88.9 FL (ref 79.8–96.2)
MCV RBC AUTO: 89.3 FL (ref 79.8–96.2)
MCV RBC AUTO: 93.2 FL (ref 79.8–96.2)
MODALITY: ABNORMAL
MODALITY: ABNORMAL
MONOCYTES # BLD AUTO: 0.64 10*3/MM3 (ref 0.2–1.2)
MONOCYTES # BLD AUTO: 0.73 10*3/MM3 (ref 0.2–1.2)
MONOCYTES NFR BLD AUTO: 5.3 % (ref 5–12)
MONOCYTES NFR BLD AUTO: 9.3 % (ref 5–12)
NEUTROPHILS # BLD AUTO: 4.41 10*3/MM3 (ref 1.9–8.1)
NEUTROPHILS # BLD AUTO: 9.34 10*3/MM3 (ref 1.9–8.1)
NEUTROPHILS NFR BLD AUTO: 56.1 % (ref 42.7–76)
NEUTROPHILS NFR BLD AUTO: 77.8 % (ref 42.7–76)
O2 A-A PPRESDIFF RESPIRATORY: 0.4 MMHG
O2 A-A PPRESDIFF RESPIRATORY: 0.4 MMHG
PCO2 BLDA: 30.9 MM HG (ref 35–45)
PCO2 BLDA: 39.3 MM HG (ref 35–45)
PCO2 BLDA: 45.2 MM HG (ref 35–45)
PEEP RESPIRATORY: 7.5 CM[H2O]
PEEP RESPIRATORY: 7.5 CM[H2O]
PH BLDA: 7.4 PH UNITS (ref 7.35–7.6)
PH BLDA: 7.41 PH UNITS (ref 7.35–7.45)
PH BLDA: 7.51 PH UNITS (ref 7.35–7.45)
PHOSPHATE SERPL-MCNC: 2.9 MG/DL (ref 2.5–4.5)
PHOSPHATE SERPL-MCNC: 3 MG/DL (ref 2.5–4.5)
PLAT MORPH BLD: NORMAL
PLATELET # BLD AUTO: 123 10*3/MM3 (ref 140–500)
PLATELET # BLD AUTO: 170 10*3/MM3 (ref 140–500)
PLATELET # BLD AUTO: 210 10*3/MM3 (ref 140–500)
PMV BLD AUTO: 11.1 FL (ref 6–12)
PMV BLD AUTO: 11.4 FL (ref 6–12)
PMV BLD AUTO: 11.7 FL (ref 6–12)
PO2 BLDA: 117.8 MM HG (ref 80–100)
PO2 BLDA: 312.3 MM HG (ref 80–100)
PO2 BLDA: 314 MMHG (ref 80–105)
POTASSIUM BLD-SCNC: 3.8 MMOL/L (ref 3.5–5.2)
POTASSIUM BLD-SCNC: 3.8 MMOL/L (ref 3.5–5.2)
POTASSIUM BLD-SCNC: 3.9 MMOL/L (ref 3.5–5.2)
POTASSIUM BLD-SCNC: 4.5 MMOL/L (ref 3.5–5.2)
POTASSIUM BLDA-SCNC: 4.1 MMOL/L (ref 3.5–4.9)
PROTHROMBIN TIME: 16.4 SECONDS (ref 11.7–14.2)
PSV: 8 CMH2O
RBC # BLD AUTO: 3.65 10*6/MM3 (ref 4.6–6)
RBC # BLD AUTO: 3.87 10*6/MM3 (ref 4.6–6)
RBC # BLD AUTO: 4.57 10*6/MM3 (ref 4.6–6)
RBC MORPH BLD: NORMAL
SAO2 % BLDA: 100 % (ref 95–98)
SAO2 % BLDCOA: 99 % (ref 92–99)
SAO2 % BLDCOA: 99.9 % (ref 92–99)
SET MECH RESP RATE: 12
SODIUM BLD-SCNC: 138 MMOL/L (ref 136–145)
SODIUM BLD-SCNC: 139 MMOL/L (ref 136–145)
SODIUM BLD-SCNC: 141 MMOL/L (ref 136–145)
TOTAL RATE: 12 BREATHS/MINUTE
TOTAL RATE: 6 BREATHS/MINUTE
VENTILATOR MODE: ABNORMAL
VENTILATOR MODE: ABNORMAL
VT ON VENT VENT: 800 ML
VT ON VENT VENT: 899 ML
WBC MORPH BLD: NORMAL
WBC NRBC COR # BLD: 10.92 10*3/MM3 (ref 4.5–10.7)
WBC NRBC COR # BLD: 12.02 10*3/MM3 (ref 4.5–10.7)
WBC NRBC COR # BLD: 7.86 10*3/MM3 (ref 4.5–10.7)

## 2018-07-10 PROCEDURE — 33405 REPLACEMENT AORTIC VALVE OPN: CPT | Performed by: THORACIC SURGERY (CARDIOTHORACIC VASCULAR SURGERY)

## 2018-07-10 PROCEDURE — 5A1221Z PERFORMANCE OF CARDIAC OUTPUT, CONTINUOUS: ICD-10-PCS | Performed by: THORACIC SURGERY (CARDIOTHORACIC VASCULAR SURGERY)

## 2018-07-10 PROCEDURE — 85014 HEMATOCRIT: CPT

## 2018-07-10 PROCEDURE — 85018 HEMOGLOBIN: CPT

## 2018-07-10 PROCEDURE — 25010000002 AMIODARONE IN DEXTROSE 5% 360-4.14 MG/200ML-% SOLUTION: Performed by: ANESTHESIOLOGY

## 2018-07-10 PROCEDURE — 25010000003 CEFAZOLIN IN DEXTROSE 2-4 GM/100ML-% SOLUTION: Performed by: THORACIC SURGERY (CARDIOTHORACIC VASCULAR SURGERY)

## 2018-07-10 PROCEDURE — 25010000002 PROPOFOL 10 MG/ML EMULSION: Performed by: ANESTHESIOLOGY

## 2018-07-10 PROCEDURE — 94799 UNLISTED PULMONARY SVC/PX: CPT

## 2018-07-10 PROCEDURE — 25010000003 PROTAMINE SULFATE PER 10 MG: Performed by: THORACIC SURGERY (CARDIOTHORACIC VASCULAR SURGERY)

## 2018-07-10 PROCEDURE — 25010000002 MIDAZOLAM PER 1 MG: Performed by: ANESTHESIOLOGY

## 2018-07-10 PROCEDURE — 82803 BLOOD GASES ANY COMBINATION: CPT

## 2018-07-10 PROCEDURE — 85730 THROMBOPLASTIN TIME PARTIAL: CPT | Performed by: THORACIC SURGERY (CARDIOTHORACIC VASCULAR SURGERY)

## 2018-07-10 PROCEDURE — 02RF08Z REPLACEMENT OF AORTIC VALVE WITH ZOOPLASTIC TISSUE, OPEN APPROACH: ICD-10-PCS | Performed by: THORACIC SURGERY (CARDIOTHORACIC VASCULAR SURGERY)

## 2018-07-10 PROCEDURE — 82962 GLUCOSE BLOOD TEST: CPT

## 2018-07-10 PROCEDURE — 25010000002 VANCOMYCIN PER 500 MG

## 2018-07-10 PROCEDURE — 83735 ASSAY OF MAGNESIUM: CPT | Performed by: THORACIC SURGERY (CARDIOTHORACIC VASCULAR SURGERY)

## 2018-07-10 PROCEDURE — 88305 TISSUE EXAM BY PATHOLOGIST: CPT | Performed by: THORACIC SURGERY (CARDIOTHORACIC VASCULAR SURGERY)

## 2018-07-10 PROCEDURE — 94003 VENT MGMT INPAT SUBQ DAY: CPT

## 2018-07-10 PROCEDURE — P9041 ALBUMIN (HUMAN),5%, 50ML: HCPCS | Performed by: THORACIC SURGERY (CARDIOTHORACIC VASCULAR SURGERY)

## 2018-07-10 PROCEDURE — 25010000002 METOCLOPRAMIDE PER 10 MG: Performed by: THORACIC SURGERY (CARDIOTHORACIC VASCULAR SURGERY)

## 2018-07-10 PROCEDURE — 25010000002 PAPAVERINE PER 60 MG: Performed by: THORACIC SURGERY (CARDIOTHORACIC VASCULAR SURGERY)

## 2018-07-10 PROCEDURE — 25010000002 AMIODARONE IN DEXTROSE 5% 360-4.14 MG/200ML-% SOLUTION

## 2018-07-10 PROCEDURE — 3E080GC INTRODUCTION OF OTHER THERAPEUTIC SUBSTANCE INTO HEART, OPEN APPROACH: ICD-10-PCS | Performed by: THORACIC SURGERY (CARDIOTHORACIC VASCULAR SURGERY)

## 2018-07-10 PROCEDURE — 25010000002 HEPARIN (PORCINE) PER 1000 UNITS

## 2018-07-10 PROCEDURE — 94760 N-INVAS EAR/PLS OXIMETRY 1: CPT

## 2018-07-10 PROCEDURE — 85347 COAGULATION TIME ACTIVATED: CPT

## 2018-07-10 PROCEDURE — 25010000002 HEPARIN (PORCINE) PER 1000 UNITS: Performed by: ANESTHESIOLOGY

## 2018-07-10 PROCEDURE — 85007 BL SMEAR W/DIFF WBC COUNT: CPT | Performed by: THORACIC SURGERY (CARDIOTHORACIC VASCULAR SURGERY)

## 2018-07-10 PROCEDURE — 25010000002 ALBUMIN HUMAN 5% PER 50 ML: Performed by: THORACIC SURGERY (CARDIOTHORACIC VASCULAR SURGERY)

## 2018-07-10 PROCEDURE — 25010000002 PHENYLEPHRINE PER 1 ML: Performed by: ANESTHESIOLOGY

## 2018-07-10 PROCEDURE — 94002 VENT MGMT INPAT INIT DAY: CPT

## 2018-07-10 PROCEDURE — A4648 IMPLANTABLE TISSUE MARKER: HCPCS | Performed by: THORACIC SURGERY (CARDIOTHORACIC VASCULAR SURGERY)

## 2018-07-10 PROCEDURE — C1751 CATH, INF, PER/CENT/MIDLINE: HCPCS | Performed by: ANESTHESIOLOGY

## 2018-07-10 PROCEDURE — 25010000002 MAGNESIUM SULFATE PER 500 MG OF MAGNESIUM: Performed by: ANESTHESIOLOGY

## 2018-07-10 PROCEDURE — 25010000002 MORPHINE SULFATE (PF) 2 MG/ML SOLUTION: Performed by: THORACIC SURGERY (CARDIOTHORACIC VASCULAR SURGERY)

## 2018-07-10 PROCEDURE — P9016 RBC LEUKOCYTES REDUCED: HCPCS

## 2018-07-10 PROCEDURE — 25010000002 FENTANYL CITRATE (PF) 100 MCG/2ML SOLUTION: Performed by: ANESTHESIOLOGY

## 2018-07-10 PROCEDURE — 85025 COMPLETE CBC W/AUTO DIFF WBC: CPT | Performed by: INTERNAL MEDICINE

## 2018-07-10 PROCEDURE — 84132 ASSAY OF SERUM POTASSIUM: CPT | Performed by: THORACIC SURGERY (CARDIOTHORACIC VASCULAR SURGERY)

## 2018-07-10 PROCEDURE — 25010000002 HEPARIN (PORCINE) PER 1000 UNITS: Performed by: THORACIC SURGERY (CARDIOTHORACIC VASCULAR SURGERY)

## 2018-07-10 PROCEDURE — 82330 ASSAY OF CALCIUM: CPT | Performed by: THORACIC SURGERY (CARDIOTHORACIC VASCULAR SURGERY)

## 2018-07-10 PROCEDURE — 25010000003 POTASSIUM CHLORIDE PER 2 MEQ: Performed by: THORACIC SURGERY (CARDIOTHORACIC VASCULAR SURGERY)

## 2018-07-10 PROCEDURE — 85610 PROTHROMBIN TIME: CPT | Performed by: THORACIC SURGERY (CARDIOTHORACIC VASCULAR SURGERY)

## 2018-07-10 PROCEDURE — 85025 COMPLETE CBC W/AUTO DIFF WBC: CPT | Performed by: THORACIC SURGERY (CARDIOTHORACIC VASCULAR SURGERY)

## 2018-07-10 PROCEDURE — 71045 X-RAY EXAM CHEST 1 VIEW: CPT

## 2018-07-10 PROCEDURE — 33510 CABG VEIN SINGLE: CPT | Performed by: THORACIC SURGERY (CARDIOTHORACIC VASCULAR SURGERY)

## 2018-07-10 PROCEDURE — C1713 ANCHOR/SCREW BN/BN,TIS/BN: HCPCS | Performed by: THORACIC SURGERY (CARDIOTHORACIC VASCULAR SURGERY)

## 2018-07-10 PROCEDURE — 80069 RENAL FUNCTION PANEL: CPT | Performed by: THORACIC SURGERY (CARDIOTHORACIC VASCULAR SURGERY)

## 2018-07-10 PROCEDURE — 82947 ASSAY GLUCOSE BLOOD QUANT: CPT

## 2018-07-10 PROCEDURE — P9046 ALBUMIN (HUMAN), 25%, 20 ML: HCPCS

## 2018-07-10 PROCEDURE — 021009W BYPASS CORONARY ARTERY, ONE ARTERY FROM AORTA WITH AUTOLOGOUS VENOUS TISSUE, OPEN APPROACH: ICD-10-PCS | Performed by: THORACIC SURGERY (CARDIOTHORACIC VASCULAR SURGERY)

## 2018-07-10 PROCEDURE — 93005 ELECTROCARDIOGRAM TRACING: CPT | Performed by: THORACIC SURGERY (CARDIOTHORACIC VASCULAR SURGERY)

## 2018-07-10 PROCEDURE — 06BQ4ZZ EXCISION OF LEFT SAPHENOUS VEIN, PERCUTANEOUS ENDOSCOPIC APPROACH: ICD-10-PCS | Performed by: THORACIC SURGERY (CARDIOTHORACIC VASCULAR SURGERY)

## 2018-07-10 PROCEDURE — 93318 ECHO TRANSESOPHAGEAL INTRAOP: CPT | Performed by: ANESTHESIOLOGY

## 2018-07-10 PROCEDURE — 80048 BASIC METABOLIC PNL TOTAL CA: CPT | Performed by: INTERNAL MEDICINE

## 2018-07-10 PROCEDURE — 33510 CABG VEIN SINGLE: CPT | Performed by: SPECIALIST/TECHNOLOGIST, OTHER

## 2018-07-10 PROCEDURE — 93010 ELECTROCARDIOGRAM REPORT: CPT | Performed by: INTERNAL MEDICINE

## 2018-07-10 PROCEDURE — 86900 BLOOD TYPING SEROLOGIC ABO: CPT

## 2018-07-10 PROCEDURE — 85384 FIBRINOGEN ACTIVITY: CPT | Performed by: THORACIC SURGERY (CARDIOTHORACIC VASCULAR SURGERY)

## 2018-07-10 PROCEDURE — 36430 TRANSFUSION BLD/BLD COMPNT: CPT

## 2018-07-10 PROCEDURE — 33405 REPLACEMENT AORTIC VALVE OPN: CPT | Performed by: SPECIALIST/TECHNOLOGIST, OTHER

## 2018-07-10 PROCEDURE — 85027 COMPLETE CBC AUTOMATED: CPT | Performed by: THORACIC SURGERY (CARDIOTHORACIC VASCULAR SURGERY)

## 2018-07-10 PROCEDURE — C1729 CATH, DRAINAGE: HCPCS | Performed by: THORACIC SURGERY (CARDIOTHORACIC VASCULAR SURGERY)

## 2018-07-10 PROCEDURE — 33508 ENDOSCOPIC VEIN HARVEST: CPT | Performed by: SPECIALIST/TECHNOLOGIST, OTHER

## 2018-07-10 PROCEDURE — 33508 ENDOSCOPIC VEIN HARVEST: CPT | Performed by: THORACIC SURGERY (CARDIOTHORACIC VASCULAR SURGERY)

## 2018-07-10 PROCEDURE — 25010000002 ALBUMIN HUMAN 25% PER 50 ML

## 2018-07-10 PROCEDURE — 25010000002 ONDANSETRON PER 1 MG: Performed by: INTERNAL MEDICINE

## 2018-07-10 DEVICE — IMPLANTABLE DEVICE: Type: IMPLANTABLE DEVICE | Site: HEART | Status: FUNCTIONAL

## 2018-07-10 RX ORDER — POTASSIUM CHLORIDE 7.45 MG/ML
10 INJECTION INTRAVENOUS
Status: DISCONTINUED | OUTPATIENT
Start: 2018-07-10 | End: 2018-07-14 | Stop reason: HOSPADM

## 2018-07-10 RX ORDER — MAGNESIUM SULFATE HEPTAHYDRATE 500 MG/ML
INJECTION, SOLUTION INTRAMUSCULAR; INTRAVENOUS AS NEEDED
Status: DISCONTINUED | OUTPATIENT
Start: 2018-07-10 | End: 2018-07-10 | Stop reason: SURG

## 2018-07-10 RX ORDER — MIDAZOLAM HYDROCHLORIDE 1 MG/ML
2 INJECTION INTRAMUSCULAR; INTRAVENOUS
Status: DISCONTINUED | OUTPATIENT
Start: 2018-07-10 | End: 2018-07-11

## 2018-07-10 RX ORDER — PROTAMINE SULFATE 10 MG/ML
INJECTION, SOLUTION INTRAVENOUS AS NEEDED
Status: DISCONTINUED | OUTPATIENT
Start: 2018-07-10 | End: 2018-07-10 | Stop reason: HOSPADM

## 2018-07-10 RX ORDER — SENNA AND DOCUSATE SODIUM 50; 8.6 MG/1; MG/1
2 TABLET, FILM COATED ORAL NIGHTLY
Status: DISCONTINUED | OUTPATIENT
Start: 2018-07-11 | End: 2018-07-14 | Stop reason: HOSPADM

## 2018-07-10 RX ORDER — MAGNESIUM SULFATE 1 G/100ML
1 INJECTION INTRAVENOUS EVERY 8 HOURS
Status: DISCONTINUED | OUTPATIENT
Start: 2018-07-10 | End: 2018-07-11

## 2018-07-10 RX ORDER — DOPAMINE HYDROCHLORIDE 160 MG/100ML
2-20 INJECTION, SOLUTION INTRAVENOUS CONTINUOUS PRN
Status: DISCONTINUED | OUTPATIENT
Start: 2018-07-10 | End: 2018-07-11

## 2018-07-10 RX ORDER — MAGNESIUM HYDROXIDE 1200 MG/15ML
LIQUID ORAL AS NEEDED
Status: DISCONTINUED | OUTPATIENT
Start: 2018-07-10 | End: 2018-07-10 | Stop reason: HOSPADM

## 2018-07-10 RX ORDER — ACETAMINOPHEN 650 MG/1
650 SUPPOSITORY RECTAL EVERY 4 HOURS PRN
Status: DISCONTINUED | OUTPATIENT
Start: 2018-07-10 | End: 2018-07-14 | Stop reason: HOSPADM

## 2018-07-10 RX ORDER — ACETAMINOPHEN 325 MG/1
650 TABLET ORAL EVERY 4 HOURS PRN
Status: DISCONTINUED | OUTPATIENT
Start: 2018-07-10 | End: 2018-07-14 | Stop reason: HOSPADM

## 2018-07-10 RX ORDER — POTASSIUM CHLORIDE 29.8 MG/ML
20 INJECTION INTRAVENOUS
Status: DISCONTINUED | OUTPATIENT
Start: 2018-07-10 | End: 2018-07-14 | Stop reason: HOSPADM

## 2018-07-10 RX ORDER — VECURONIUM BROMIDE 1 MG/ML
INJECTION, POWDER, LYOPHILIZED, FOR SOLUTION INTRAVENOUS AS NEEDED
Status: DISCONTINUED | OUTPATIENT
Start: 2018-07-10 | End: 2018-07-10 | Stop reason: SURG

## 2018-07-10 RX ORDER — PANTOPRAZOLE SODIUM 40 MG/1
40 TABLET, DELAYED RELEASE ORAL DAILY
Status: DISCONTINUED | OUTPATIENT
Start: 2018-07-11 | End: 2018-07-14 | Stop reason: HOSPADM

## 2018-07-10 RX ORDER — PAPAVERINE HYDROCHLORIDE 30 MG/ML
INJECTION INTRAMUSCULAR; INTRAVENOUS AS NEEDED
Status: DISCONTINUED | OUTPATIENT
Start: 2018-07-10 | End: 2018-07-10 | Stop reason: HOSPADM

## 2018-07-10 RX ORDER — METOCLOPRAMIDE HYDROCHLORIDE 5 MG/ML
10 INJECTION INTRAMUSCULAR; INTRAVENOUS EVERY 6 HOURS
Status: DISCONTINUED | OUTPATIENT
Start: 2018-07-10 | End: 2018-07-11

## 2018-07-10 RX ORDER — FENTANYL CITRATE 50 UG/ML
INJECTION, SOLUTION INTRAMUSCULAR; INTRAVENOUS AS NEEDED
Status: DISCONTINUED | OUTPATIENT
Start: 2018-07-10 | End: 2018-07-10 | Stop reason: SURG

## 2018-07-10 RX ORDER — MORPHINE SULFATE 2 MG/ML
1 INJECTION, SOLUTION INTRAMUSCULAR; INTRAVENOUS EVERY 4 HOURS PRN
Status: DISCONTINUED | OUTPATIENT
Start: 2018-07-10 | End: 2018-07-14 | Stop reason: HOSPADM

## 2018-07-10 RX ORDER — ATORVASTATIN CALCIUM 20 MG/1
40 TABLET, FILM COATED ORAL NIGHTLY
Status: DISCONTINUED | OUTPATIENT
Start: 2018-07-10 | End: 2018-07-14 | Stop reason: HOSPADM

## 2018-07-10 RX ORDER — MILRINONE LACTATE 0.2 MG/ML
.25-.75 INJECTION, SOLUTION INTRAVENOUS CONTINUOUS PRN
Status: DISCONTINUED | OUTPATIENT
Start: 2018-07-10 | End: 2018-07-11

## 2018-07-10 RX ORDER — MORPHINE SULFATE 2 MG/ML
4 INJECTION, SOLUTION INTRAMUSCULAR; INTRAVENOUS
Status: DISCONTINUED | OUTPATIENT
Start: 2018-07-10 | End: 2018-07-14 | Stop reason: HOSPADM

## 2018-07-10 RX ORDER — NITROGLYCERIN 5 MG/ML
INJECTION, SOLUTION INTRAVENOUS AS NEEDED
Status: DISCONTINUED | OUTPATIENT
Start: 2018-07-10 | End: 2018-07-10 | Stop reason: SURG

## 2018-07-10 RX ORDER — NALOXONE HCL 0.4 MG/ML
0.4 VIAL (ML) INJECTION
Status: DISCONTINUED | OUTPATIENT
Start: 2018-07-10 | End: 2018-07-14 | Stop reason: HOSPADM

## 2018-07-10 RX ORDER — CHLORHEXIDINE GLUCONATE 0.12 MG/ML
15 RINSE ORAL EVERY 12 HOURS
Status: DISCONTINUED | OUTPATIENT
Start: 2018-07-10 | End: 2018-07-13

## 2018-07-10 RX ORDER — CEFAZOLIN SODIUM 2 G/100ML
2 INJECTION, SOLUTION INTRAVENOUS EVERY 8 HOURS
Status: DISPENSED | OUTPATIENT
Start: 2018-07-10 | End: 2018-07-12

## 2018-07-10 RX ORDER — HEPARIN SODIUM 1000 [USP'U]/ML
INJECTION, SOLUTION INTRAVENOUS; SUBCUTANEOUS AS NEEDED
Status: DISCONTINUED | OUTPATIENT
Start: 2018-07-10 | End: 2018-07-10 | Stop reason: SURG

## 2018-07-10 RX ORDER — PROMETHAZINE HYDROCHLORIDE 25 MG/1
12.5 TABLET ORAL EVERY 6 HOURS PRN
Status: DISCONTINUED | OUTPATIENT
Start: 2018-07-10 | End: 2018-07-14 | Stop reason: HOSPADM

## 2018-07-10 RX ORDER — SODIUM CHLORIDE 9 MG/ML
30 INJECTION, SOLUTION INTRAVENOUS CONTINUOUS
Status: DISCONTINUED | OUTPATIENT
Start: 2018-07-10 | End: 2018-07-14 | Stop reason: HOSPADM

## 2018-07-10 RX ORDER — FUROSEMIDE 10 MG/ML
40 INJECTION INTRAMUSCULAR; INTRAVENOUS EVERY 6 HOURS PRN
Status: DISCONTINUED | OUTPATIENT
Start: 2018-07-10 | End: 2018-07-11

## 2018-07-10 RX ORDER — SODIUM CHLORIDE 9 MG/ML
30 INJECTION, SOLUTION INTRAVENOUS CONTINUOUS PRN
Status: DISCONTINUED | OUTPATIENT
Start: 2018-07-10 | End: 2018-07-14 | Stop reason: HOSPADM

## 2018-07-10 RX ORDER — BISACODYL 10 MG
10 SUPPOSITORY, RECTAL RECTAL DAILY PRN
Status: DISCONTINUED | OUTPATIENT
Start: 2018-07-11 | End: 2018-07-14 | Stop reason: HOSPADM

## 2018-07-10 RX ORDER — ASPIRIN 81 MG/1
81 TABLET ORAL DAILY
Status: DISCONTINUED | OUTPATIENT
Start: 2018-07-11 | End: 2018-07-14 | Stop reason: HOSPADM

## 2018-07-10 RX ORDER — PROPOFOL 10 MG/ML
VIAL (ML) INTRAVENOUS AS NEEDED
Status: DISCONTINUED | OUTPATIENT
Start: 2018-07-10 | End: 2018-07-10 | Stop reason: SURG

## 2018-07-10 RX ORDER — TRANEXAMIC ACID 100 MG/ML
INJECTION, SOLUTION INTRAVENOUS AS NEEDED
Status: DISCONTINUED | OUTPATIENT
Start: 2018-07-10 | End: 2018-07-10 | Stop reason: SURG

## 2018-07-10 RX ORDER — PROPOFOL 10 MG/ML
VIAL (ML) INTRAVENOUS CONTINUOUS PRN
Status: DISCONTINUED | OUTPATIENT
Start: 2018-07-10 | End: 2018-07-10 | Stop reason: SURG

## 2018-07-10 RX ORDER — PROMETHAZINE HYDROCHLORIDE 25 MG/ML
12.5 INJECTION, SOLUTION INTRAMUSCULAR; INTRAVENOUS EVERY 6 HOURS PRN
Status: DISCONTINUED | OUTPATIENT
Start: 2018-07-10 | End: 2018-07-14 | Stop reason: HOSPADM

## 2018-07-10 RX ORDER — OXYCODONE HYDROCHLORIDE 5 MG/1
10 TABLET ORAL EVERY 4 HOURS PRN
Status: DISCONTINUED | OUTPATIENT
Start: 2018-07-10 | End: 2018-07-14 | Stop reason: HOSPADM

## 2018-07-10 RX ORDER — MAGNESIUM SULFATE HEPTAHYDRATE 40 MG/ML
2 INJECTION, SOLUTION INTRAVENOUS AS NEEDED
Status: DISCONTINUED | OUTPATIENT
Start: 2018-07-10 | End: 2018-07-14 | Stop reason: HOSPADM

## 2018-07-10 RX ORDER — HYDROCODONE BITARTRATE AND ACETAMINOPHEN 5; 325 MG/1; MG/1
2 TABLET ORAL EVERY 4 HOURS PRN
Status: DISCONTINUED | OUTPATIENT
Start: 2018-07-10 | End: 2018-07-14 | Stop reason: HOSPADM

## 2018-07-10 RX ORDER — ONDANSETRON 2 MG/ML
4 INJECTION INTRAMUSCULAR; INTRAVENOUS EVERY 6 HOURS PRN
Status: DISCONTINUED | OUTPATIENT
Start: 2018-07-10 | End: 2018-07-14 | Stop reason: HOSPADM

## 2018-07-10 RX ORDER — ALBUMIN, HUMAN INJ 5% 5 %
1500 SOLUTION INTRAVENOUS AS NEEDED
Status: DISCONTINUED | OUTPATIENT
Start: 2018-07-10 | End: 2018-07-11

## 2018-07-10 RX ORDER — SUFENTANIL CITRATE 50 UG/ML
INJECTION EPIDURAL; INTRAVENOUS AS NEEDED
Status: DISCONTINUED | OUTPATIENT
Start: 2018-07-10 | End: 2018-07-10 | Stop reason: SURG

## 2018-07-10 RX ORDER — NITROGLYCERIN 20 MG/100ML
5-200 INJECTION INTRAVENOUS
Status: DISCONTINUED | OUTPATIENT
Start: 2018-07-10 | End: 2018-07-11

## 2018-07-10 RX ORDER — MAGNESIUM SULFATE 1 G/100ML
1 INJECTION INTRAVENOUS AS NEEDED
Status: DISCONTINUED | OUTPATIENT
Start: 2018-07-10 | End: 2018-07-14 | Stop reason: HOSPADM

## 2018-07-10 RX ORDER — SODIUM CHLORIDE 0.9 % (FLUSH) 0.9 %
30 SYRINGE (ML) INJECTION ONCE AS NEEDED
Status: DISCONTINUED | OUTPATIENT
Start: 2018-07-10 | End: 2018-07-14 | Stop reason: HOSPADM

## 2018-07-10 RX ORDER — POTASSIUM CHLORIDE 1.5 G/1.77G
40 POWDER, FOR SOLUTION ORAL AS NEEDED
Status: DISCONTINUED | OUTPATIENT
Start: 2018-07-10 | End: 2018-07-14 | Stop reason: HOSPADM

## 2018-07-10 RX ORDER — EPHEDRINE SULFATE 50 MG/ML
INJECTION, SOLUTION INTRAVENOUS AS NEEDED
Status: DISCONTINUED | OUTPATIENT
Start: 2018-07-10 | End: 2018-07-10 | Stop reason: SURG

## 2018-07-10 RX ORDER — BISACODYL 5 MG/1
10 TABLET, DELAYED RELEASE ORAL DAILY PRN
Status: DISCONTINUED | OUTPATIENT
Start: 2018-07-10 | End: 2018-07-14 | Stop reason: HOSPADM

## 2018-07-10 RX ORDER — LIDOCAINE HYDROCHLORIDE 40 MG/ML
SOLUTION TOPICAL AS NEEDED
Status: DISCONTINUED | OUTPATIENT
Start: 2018-07-10 | End: 2018-07-10 | Stop reason: SURG

## 2018-07-10 RX ORDER — ALPRAZOLAM 0.25 MG/1
0.25 TABLET ORAL EVERY 8 HOURS PRN
Status: DISCONTINUED | OUTPATIENT
Start: 2018-07-10 | End: 2018-07-14 | Stop reason: HOSPADM

## 2018-07-10 RX ORDER — DEXMEDETOMIDINE HYDROCHLORIDE 4 UG/ML
.2-1.5 INJECTION, SOLUTION INTRAVENOUS
Status: DISCONTINUED | OUTPATIENT
Start: 2018-07-10 | End: 2018-07-11

## 2018-07-10 RX ORDER — POTASSIUM CHLORIDE 750 MG/1
40 CAPSULE, EXTENDED RELEASE ORAL AS NEEDED
Status: DISCONTINUED | OUTPATIENT
Start: 2018-07-10 | End: 2018-07-14 | Stop reason: HOSPADM

## 2018-07-10 RX ORDER — HEPARIN SODIUM 5000 [USP'U]/ML
INJECTION, SOLUTION INTRAVENOUS; SUBCUTANEOUS AS NEEDED
Status: DISCONTINUED | OUTPATIENT
Start: 2018-07-10 | End: 2018-07-10 | Stop reason: HOSPADM

## 2018-07-10 RX ORDER — PHENYLEPHRINE HCL IN 0.9% NACL 0.5 MG/5ML
.2-3 SYRINGE (ML) INTRAVENOUS CONTINUOUS PRN
Status: DISCONTINUED | OUTPATIENT
Start: 2018-07-10 | End: 2018-07-11

## 2018-07-10 RX ORDER — POTASSIUM CHLORIDE 29.8 MG/ML
20 INJECTION INTRAVENOUS
Status: COMPLETED | OUTPATIENT
Start: 2018-07-10 | End: 2018-07-10

## 2018-07-10 RX ORDER — MIDAZOLAM HYDROCHLORIDE 1 MG/ML
INJECTION INTRAMUSCULAR; INTRAVENOUS AS NEEDED
Status: DISCONTINUED | OUTPATIENT
Start: 2018-07-10 | End: 2018-07-10 | Stop reason: SURG

## 2018-07-10 RX ORDER — MEPERIDINE HYDROCHLORIDE 25 MG/ML
25 INJECTION INTRAMUSCULAR; INTRAVENOUS; SUBCUTANEOUS EVERY 4 HOURS PRN
Status: ACTIVE | OUTPATIENT
Start: 2018-07-10 | End: 2018-07-10

## 2018-07-10 RX ORDER — CYCLOBENZAPRINE HCL 10 MG
10 TABLET ORAL EVERY 8 HOURS PRN
Status: DISCONTINUED | OUTPATIENT
Start: 2018-07-11 | End: 2018-07-14 | Stop reason: HOSPADM

## 2018-07-10 RX ADMIN — PHENYLEPHRINE HYDROCHLORIDE 100 MCG: 10 INJECTION INTRAVENOUS at 06:56

## 2018-07-10 RX ADMIN — POTASSIUM CHLORIDE 20 MEQ: 400 INJECTION, SOLUTION INTRAVENOUS at 15:57

## 2018-07-10 RX ADMIN — PHENYLEPHRINE HYDROCHLORIDE 100 MCG: 10 INJECTION INTRAVENOUS at 06:58

## 2018-07-10 RX ADMIN — SUFENTANIL CITRATE 25 MCG: 50 INJECTION, SOLUTION EPIDURAL; INTRAVENOUS at 07:41

## 2018-07-10 RX ADMIN — ALBUMIN HUMAN 250 ML: 0.05 INJECTION, SOLUTION INTRAVENOUS at 15:17

## 2018-07-10 RX ADMIN — METOCLOPRAMIDE 10 MG: 5 INJECTION, SOLUTION INTRAMUSCULAR; INTRAVENOUS at 23:00

## 2018-07-10 RX ADMIN — EPHEDRINE SULFATE 20 MG: 50 INJECTION INTRAMUSCULAR; INTRAVENOUS; SUBCUTANEOUS at 07:13

## 2018-07-10 RX ADMIN — EPHEDRINE SULFATE 20 MG: 50 INJECTION INTRAMUSCULAR; INTRAVENOUS; SUBCUTANEOUS at 06:58

## 2018-07-10 RX ADMIN — SUFENTANIL CITRATE 25 MCG: 50 INJECTION, SOLUTION EPIDURAL; INTRAVENOUS at 06:55

## 2018-07-10 RX ADMIN — MUPIROCIN 10 APPLICATION: 20 OINTMENT TOPICAL at 21:03

## 2018-07-10 RX ADMIN — TRANEXAMIC ACID 1000 MG: 100 INJECTION, SOLUTION INTRAVENOUS at 07:30

## 2018-07-10 RX ADMIN — PHENYLEPHRINE HYDROCHLORIDE 100 MCG: 10 INJECTION INTRAVENOUS at 07:06

## 2018-07-10 RX ADMIN — EPHEDRINE SULFATE 10 MG: 50 INJECTION INTRAMUSCULAR; INTRAVENOUS; SUBCUTANEOUS at 06:56

## 2018-07-10 RX ADMIN — PHENYLEPHRINE HYDROCHLORIDE 0.25 MCG/KG/MIN: 10 INJECTION, SOLUTION INTRAMUSCULAR; INTRAVENOUS; SUBCUTANEOUS at 09:43

## 2018-07-10 RX ADMIN — Medication 5 MG: at 09:08

## 2018-07-10 RX ADMIN — FENTANYL CITRATE 100 MCG: 50 INJECTION INTRAMUSCULAR; INTRAVENOUS at 06:45

## 2018-07-10 RX ADMIN — VECURONIUM BROMIDE 5 MG: 1 INJECTION, POWDER, LYOPHILIZED, FOR SOLUTION INTRAVENOUS at 07:58

## 2018-07-10 RX ADMIN — AMIODARONE HYDROCHLORIDE 0.5 MG: 1.8 INJECTION, SOLUTION INTRAVENOUS at 14:37

## 2018-07-10 RX ADMIN — TRANEXAMIC ACID 340 MG: 100 INJECTION, SOLUTION INTRAVENOUS at 09:30

## 2018-07-10 RX ADMIN — PHENYLEPHRINE HYDROCHLORIDE 100 MCG: 10 INJECTION INTRAVENOUS at 09:42

## 2018-07-10 RX ADMIN — SODIUM CHLORIDE 30 ML/HR: 9 INJECTION, SOLUTION INTRAVENOUS at 17:00

## 2018-07-10 RX ADMIN — MORPHINE SULFATE 4 MG: 2 INJECTION, SOLUTION INTRAMUSCULAR; INTRAVENOUS at 11:27

## 2018-07-10 RX ADMIN — SODIUM CHLORIDE: 9 INJECTION, SOLUTION INTRAVENOUS at 06:35

## 2018-07-10 RX ADMIN — PROPOFOL 50 MG: 10 INJECTION, EMULSION INTRAVENOUS at 07:44

## 2018-07-10 RX ADMIN — PHENYLEPHRINE HYDROCHLORIDE 100 MCG: 10 INJECTION INTRAVENOUS at 09:50

## 2018-07-10 RX ADMIN — SUFENTANIL CITRATE 100 MCG: 50 INJECTION, SOLUTION EPIDURAL; INTRAVENOUS at 08:37

## 2018-07-10 RX ADMIN — NITROGLYCERIN 100 MCG: 5 INJECTION, SOLUTION INTRAVENOUS at 08:38

## 2018-07-10 RX ADMIN — PHENYLEPHRINE HYDROCHLORIDE 100 MCG: 10 INJECTION INTRAVENOUS at 07:13

## 2018-07-10 RX ADMIN — Medication 3 MG: at 06:55

## 2018-07-10 RX ADMIN — ONDANSETRON 4 MG: 2 INJECTION INTRAMUSCULAR; INTRAVENOUS at 09:27

## 2018-07-10 RX ADMIN — CEFAZOLIN SODIUM 2 G: 2 INJECTION, SOLUTION INTRAVENOUS at 09:27

## 2018-07-10 RX ADMIN — HEPARIN SODIUM 30000 UNITS: 1000 INJECTION, SOLUTION INTRAVENOUS; SUBCUTANEOUS at 07:54

## 2018-07-10 RX ADMIN — METOPROLOL TARTRATE 12.5 MG: 25 TABLET, FILM COATED ORAL at 06:13

## 2018-07-10 RX ADMIN — TRANEXAMIC ACID 340 MG: 100 INJECTION, SOLUTION INTRAVENOUS at 08:30

## 2018-07-10 RX ADMIN — MUPIROCIN 1 APPLICATION: 20 OINTMENT TOPICAL at 06:11

## 2018-07-10 RX ADMIN — ALBUMIN HUMAN 250 ML: 0.05 INJECTION, SOLUTION INTRAVENOUS at 11:28

## 2018-07-10 RX ADMIN — OXYCODONE HYDROCHLORIDE 10 MG: 5 TABLET ORAL at 14:49

## 2018-07-10 RX ADMIN — PROPOFOL 50 MCG/KG/MIN: 10 INJECTION, EMULSION INTRAVENOUS at 07:54

## 2018-07-10 RX ADMIN — CEFAZOLIN SODIUM 2 G: 2 INJECTION, SOLUTION INTRAVENOUS at 18:32

## 2018-07-10 RX ADMIN — SODIUM CHLORIDE 30 ML/HR: 9 INJECTION, SOLUTION INTRAVENOUS at 11:19

## 2018-07-10 RX ADMIN — EPHEDRINE SULFATE 20 MG: 50 INJECTION INTRAMUSCULAR; INTRAVENOUS; SUBCUTANEOUS at 07:27

## 2018-07-10 RX ADMIN — POTASSIUM CHLORIDE 20 MEQ: 400 INJECTION, SOLUTION INTRAVENOUS at 12:03

## 2018-07-10 RX ADMIN — CHLORHEXIDINE GLUCONATE 15 ML: 1.2 RINSE ORAL at 06:10

## 2018-07-10 RX ADMIN — PHENYLEPHRINE HYDROCHLORIDE 100 MCG: 10 INJECTION INTRAVENOUS at 09:37

## 2018-07-10 RX ADMIN — HYDROCODONE BITARTRATE AND ACETAMINOPHEN 2 TABLET: 5; 325 TABLET ORAL at 18:46

## 2018-07-10 RX ADMIN — PROPOFOL 150 MG: 10 INJECTION, EMULSION INTRAVENOUS at 06:55

## 2018-07-10 RX ADMIN — Medication 2 MG: at 06:45

## 2018-07-10 RX ADMIN — CEFAZOLIN SODIUM 2 G: 2 INJECTION, SOLUTION INTRAVENOUS at 07:10

## 2018-07-10 RX ADMIN — DEXMEDETOMIDINE HYDROCHLORIDE 0.2 MCG/KG/HR: 4 INJECTION, SOLUTION INTRAVENOUS at 12:13

## 2018-07-10 RX ADMIN — SODIUM CHLORIDE 1.8 UNITS/HR: 9 INJECTION, SOLUTION INTRAVENOUS at 17:28

## 2018-07-10 RX ADMIN — AMIODARONE HYDROCHLORIDE 1 MG/MIN: 1.8 INJECTION, SOLUTION INTRAVENOUS at 08:54

## 2018-07-10 RX ADMIN — SUFENTANIL CITRATE 50 MCG: 50 INJECTION, SOLUTION EPIDURAL; INTRAVENOUS at 09:22

## 2018-07-10 RX ADMIN — VECURONIUM BROMIDE 5 MG: 1 INJECTION, POWDER, LYOPHILIZED, FOR SOLUTION INTRAVENOUS at 09:14

## 2018-07-10 RX ADMIN — VECURONIUM BROMIDE 10 MG: 1 INJECTION, POWDER, LYOPHILIZED, FOR SOLUTION INTRAVENOUS at 06:55

## 2018-07-10 RX ADMIN — PHENYLEPHRINE HYDROCHLORIDE 100 MCG: 10 INJECTION INTRAVENOUS at 09:59

## 2018-07-10 RX ADMIN — Medication 5 MG: at 09:32

## 2018-07-10 RX ADMIN — ALBUMIN HUMAN 250 ML: 0.05 INJECTION, SOLUTION INTRAVENOUS at 11:48

## 2018-07-10 RX ADMIN — MAGNESIUM SULFATE HEPTAHYDRATE 2 G: 500 INJECTION, SOLUTION INTRAMUSCULAR; INTRAVENOUS at 09:07

## 2018-07-10 RX ADMIN — ATORVASTATIN CALCIUM 40 MG: 20 TABLET, FILM COATED ORAL at 21:03

## 2018-07-10 RX ADMIN — EPHEDRINE SULFATE 10 MG: 50 INJECTION INTRAMUSCULAR; INTRAVENOUS; SUBCUTANEOUS at 07:58

## 2018-07-10 RX ADMIN — PHENYLEPHRINE HYDROCHLORIDE 100 MCG: 10 INJECTION INTRAVENOUS at 10:30

## 2018-07-10 RX ADMIN — LIDOCAINE HYDROCHLORIDE 1 EACH: 40 SOLUTION TOPICAL at 06:59

## 2018-07-10 RX ADMIN — OXYCODONE HYDROCHLORIDE 10 MG: 5 TABLET ORAL at 22:59

## 2018-07-10 RX ADMIN — CHLORHEXIDINE GLUCONATE 15 ML: 1.2 RINSE ORAL at 11:25

## 2018-07-10 RX ADMIN — SODIUM CHLORIDE 0.25 MCG/KG/MIN: 0.9 INJECTION, SOLUTION INTRAVENOUS at 09:04

## 2018-07-10 RX ADMIN — PHENYLEPHRINE HYDROCHLORIDE 100 MCG: 10 INJECTION INTRAVENOUS at 10:04

## 2018-07-10 RX ADMIN — SUFENTANIL CITRATE 50 MCG: 50 INJECTION, SOLUTION EPIDURAL; INTRAVENOUS at 07:44

## 2018-07-10 RX ADMIN — PHENYLEPHRINE HYDROCHLORIDE 100 MCG: 10 INJECTION INTRAVENOUS at 07:27

## 2018-07-10 NOTE — PLAN OF CARE
Problem: Cardiac Catheterization (Diagnostic/Interventional) (Adult)  Goal: Signs and Symptoms of Listed Potential Problems Will be Absent, Minimized or Managed (Cardiac Catheterization)  Outcome: Ongoing (interventions implemented as appropriate)   07/10/18 0511   Goal/Outcome Evaluation   Problems Assessed (Cardiac Catheterization) all   Problems Present (Cardiac Cath) none

## 2018-07-10 NOTE — ANESTHESIA PROCEDURE NOTES
Central Line    Patient location during procedure: OR  Start time: 7/10/2018 7:04 AM  Stop Time:7/10/2018 7:14 AM  Indications: vascular access  Staff  Anesthesiologist: YUDELKA WILD  Preanesthetic Checklist  Completed: patient identified, site marked, surgical consent, pre-op evaluation, timeout performed, IV checked, risks and benefits discussed and monitors and equipment checked  Central Line Prep  Sterile Tech:cap, gloves, gown, mask and sterile barriers  Prep: chloraprep  Patient monitoring: blood pressure monitoring, continuous pulse oximetry and EKG  Central Line Procedure  Laterality:right  Location:internal jugular  Catheter Type:Vining-Felipe, Cordis and single lumen  Catheter Size:8.5 Fr  Guidance:landmark technique  Assessment  Post procedure:biopatch applied, line sutured and occlusive dressing applied  Assessement:blood return through all ports, free fluid flow and Herman Test  Complications:no  Patient Tolerance:patient tolerated the procedure well with no apparent complications

## 2018-07-10 NOTE — PAYOR COMM NOTE
"Vani Morley (62 y.o. Male)                   ATTENTION;   CLINICALS FOR YOUR REVIEW, AUTH PENDING [id number], REPLY TO UR DEPT                 [facility] GONZALEZ N  OR UR  [phone]       Date of Birth Social Security Number Address Home Phone MRN    1955  [address] [address] [phone] [id number]    Buddhist Marital Status          Denominational        Admission Date Admission Type Admitting Provider Attending Provider Department, Room/Bed    7/9/18 Elective Alexandria Ashraf MD Gondi, Sreedevi, MD [facility] CARDIO RECOVERY, 2004/1    Discharge Date Discharge Disposition Discharge Destination                       Attending Provider:  Alexandria Ashraf MD    Allergies:  No Known Allergies    Isolation:  None   Infection:  None   Code Status:  CPR    Ht:  188 cm (74\")   Wt:  115 kg (253 lb 14.4 oz)    Admission Cmt:  None   Principal Problem:  Nonrheumatic aortic valve stenosis [I35.0] More...                 Active Insurance as of 7/9/2018     Primary Coverage     Payor Plan Insurance Group Employer/Plan Group    [employer] EM[employer]OYEE [id number]     Payor Plan Address Payor Plan Phone Number Effective From Effective To    [address] [phone] 1/1/2018     [facility] [id number]       Subscriber Name Subscriber Birth Date Member ID       VANI MORLEY 1955 [id number]                 Emergency Contacts      (Rel.) Home Phone Work Phone Mobile Phone    Yaneth Morley (Spouse) [phone] -- --               History & Physical      Alexandria Ashraf MD at 7/9/2018  8:07 AM          H&P updated. The patient was examined and the following changes are noted:  evaluated by CT surgery who recommended proceeding with aortic valve replacement.  For pre-op cardiac catheterization today.         Electronically signed by Alexandria Ashraf MD at 7/9/2018  8:08 AM   Source Note                 Subjective: "     Encounter Date:12/05/2017      Patient ID: Sachin Tolliver is a 62 y.o. male.    Chief Complaint:  Heart Murmur   This is a chronic problem. The current episode started more than 1 month ago. The problem has been unchanged. Pertinent negatives include no chest pain, diaphoresis, fatigue, numbness or visual change.       62-year-old gentleman who presents today for reevaluation.  He was found to have moderate to severe aortic stenosis 6 months ago.  He presents today doing well really with no problems.  Patient still clinically remains asymptomatic.        Review of Systems   Constitution: Negative for diaphoresis and fatigue.   Cardiovascular: Negative for chest pain.   Neurological: Negative for numbness.   All other systems reviewed and are negative.      Procedures         Objective:     Physical Exam   Constitutional: He is oriented to person, place, and time. He appears well-developed.   HENT:   Head: Normocephalic.   Eyes: Conjunctivae are normal.   Neck: Normal range of motion.   Cardiovascular: Normal rate and regular rhythm.    Murmur heard.   Harsh midsystolic murmur is present with a grade of 1/6  at the upper right sternal border  Pulmonary/Chest: Breath sounds normal.   Abdominal: Soft. Bowel sounds are normal.   Musculoskeletal: Normal range of motion. He exhibits no edema.   Neurological: He is alert and oriented to person, place, and time.   Skin: Skin is warm and dry.   Psychiatric: He has a normal mood and affect. His behavior is normal.   Vitals reviewed.      Lab Review:       Assessment:          Diagnosis Plan   1. Nonrheumatic aortic valve stenosis  Ambulatory Referral to Cardiothoracic Surgery          Plan:       1.  Patient with moderate to severe aortic stenosis.  He had a repeat echocardiogram today that showed his valve area had not changed and his gradients have not changed however his LV cavity has significantly enlarged.  At this point I think we need to consult cardiothoracic  surgery for an opinion.  I think it's time that we need to replace his valve but will wait for their input.  On physical exam today his murmur was actually less which is not a good sign.  2.  Blood pressures great  3.  Patient did have PVCs noted on his EKG today.  They were not symptomatic.  I would follow for now clinically              Electronically signed by Иван Irene MD at 2018  2:53 PM                 [facility] CATH LAB  [address] [place] [id number] [phone]             Sachin Tolliver   Cardiac Catheterization/Vascular Study   Order# [id number]   Reading physician: Alexandria Ashraf MD Ordering physician: Иван Irene MD Study date: 18    Procedures     Coronary angiography   Right and Left Heart Cath      Patient Information     Patient Name  Sachin Tolliver MRN  [id number] Sex  Male  (Age)  1955 (62 y.o.)   Race Ethnicity Encounter Category   White or  Not  or  Elective   Physicians     Panel Physicians Referring Physician Case Authorizing Physician   Alexandria Ashraf MD (Primary)  Иван Irene MD   Indications     Nonrheumatic aortic valve stenosis [I35.0 (ICD-10-CM)]       Conclusion     CARDIAC CATHETERIZATION REPORT     DATE OF PROCEDURE: 2018     INDICATION FOR PROCEDURE:      PROCEDURE PERFORMED:   1.  Right heart catheterization  2.  Selective coronary angiography        PROCEDURE COMMENTS:      After informed consent was obtained the patient's brought to the cardiac catheterization laboratory where his right arm was prepped and draped in a sterile manner.  1 mL of 2% lidocaine was infused subcutaneously into the right antecubital region.  A previously placed for IV was exchanged over a wire for a 5 Sammarinese glide sheath.  A right heart catheterization was then performed using a 5 Sammarinese balloon tip Stock[place]-Felipe catheter which was placed initially in the pulmonary wedge position.  Pressures were then measured  upon pullback.  An arterial samples drawn from the pulmonary artery and from the right radial artery, after access to that artery was obtained, for Jose Cruz calculations.  Following completion of the a right heart catheterization the [place]-Felipe catheter was removed.     Proceeded next with the coronary angiograms.  1 mL of 2% lidocaine was infused simultaneously into the right wrist.  Access to the right radial arteries obtained using a micropuncture needle and under ultrasound guidance followed by placement of a 5 Cambodian glide sheath.  Selective coronary angiograms were then performed using a 5 Cambodian FL 3.5 and a 5 Cambodian FR4 diagnostic catheters.  A left heart catheterization and a left ventricular angiogram were deferred due to known severe aortic stenosis.     Following completion of the procedure all wires and catheters removed.  The 5 Cambodian venous and arterial sheaths were removed, manual pressure held, and a pressure dressing placed.  After hemostasis was achieved the patient was transferred to the recovery area in stable condition.  There were no immediate complications and the patient tolerated the procedure well.        FINDINGS:     RIGHT HEART HEMODYNAMICS:    1.  Pulmonary wedge pressure: 20/18, 12  2.  Pulmonary artery pressure: 33/7, 18  3.  Right ventricular pressure: 34 over 4, 4  4.  Right atrial pressure: 10/5, 4  Pulmonary artery saturation: 69% on room air  6.  Right radial artery saturation: 95% on room air  7.  Jose Cruz calculated cardiac output 6.16 L/m, cardiac index 2.54 L/m/m².     CORONARY ANGIOGRAPHY:   1.  Left main: 0% stenosis.  The vessel bifurcates into a left anterior descending and left circumflex arteries.  2.  Left anterior descending artery: 20% proximal stenosis.  Gives rise to a large first diagonal branch with 0% stenosis.  A moderate size second diagonal branch has an 80% ostial stenosis.  The mid to distal LAD has 0% stenosis.  3.  Left circumflex artery: 0% proximal stenosis.   Gives rise to a large branching first obtuse marginal branch with 0% stenosis.  4.  Right coronary artery: 0% stenosis.  Vessel bifurcates into a large posterior descending and posterior lateral branches both with 0% stenosis.  This is a right dominant system.           POST-PROCEDURE DIAGNOSIS:   1. Aortic stenosis  2. Normal pulmonary pressures  3. Single small vessel coronary artery disease     RECOMMENDATIONS:   Admit for surgical AVR.             ICU Vital Signs     Row Name 07/10/18 1105 07/10/18 1034 07/10/18 0629 07/10/18 0500 07/10/18 0408       Height and Weight    Weight  --  --  -- 115 kg (253 lb 14.4 oz)  --    Weight Method  --  --  -- Standing scale  --       Vitals    Temp  --  --  --  -- 97.6 °F (36.4 °C)    Temp src  --  --  --  -- Oral    Pulse 80 78  --  -- 76    Heart Rate Source Monitor Monitor  --  -- Monitor    Resp 12 12  --  -- 18    Resp Rate Source Ventilator Ventilator  --  -- Visual    Resp Rate (Observed) Vent 12 12  --  --  --    BP (!)  77/47  --  --  -- 131/82    Noninvasive MAP (mmHg) 55  --  --  --  --    BP Location  --  --  --  -- Left arm    BP Method  --  --  --  -- Automatic    Patient Position  --  --  --  -- Lying       Oxygen Therapy    SpO2 100 % 100 %  --  -- 97 %    Pulse Oximetry Type Continuous Continuous  --  -- Continuous    Device (Oxygen Therapy) ventilator ventilator room air  -- room air    Oxygen Concentration (%) 40 100  --  --  --    Row Name 07/09/18 2000 07/09/18 1913 07/09/18 1557 07/09/18 1231          Vitals    Temp  -- 98 °F (36.7 °C) 98.1 °F (36.7 °C)  --     Temp src  -- Oral Oral  --     Pulse  -- 71 68 65     Heart Rate Source  -- Monitor Monitor Monitor     Resp  -- 18 16 18     Resp Rate Source  -- Visual Visual Visual     BP  -- 126/79 125/83 121/80     Noninvasive MAP (mmHg)  --  --  -- 93     BP Location  -- Left arm Left arm Left arm     BP Method  -- Automatic Automatic Automatic     Patient Position  --  -- Sitting Sitting        Oxygen  Therapy    SpO2  -- 93 % 97 % 96 %     Pulse Oximetry Type  -- Continuous  -- Continuous     Device (Oxygen Therapy) room air room air  -- room air         Intake & Output (last day)       07/09 0701 - 07/10 0700 07/10 0701 - 07/11 0700    P.O. 920     I.V. (mL/kg) 200 (1.7) 1800 (15.7)    Blood  0    Total Intake(mL/kg) 1120 (9.7) 1800 (15.7)    Urine (mL/kg/hr) 2600 (0.9) 350 (0.6)    Blood  100 (0.2)    Total Output 2600 450    Net -1480 +1350              Lines, Drains & Airways    Active LDAs     Name:   Placement date:   Placement time:   Site:   Days:    Pulmonary Artery Catheter - Triple Lumen 07/10/18 Right Internal jugular  07/10/18    0704 created via procedure documentation    less than 1    Peripheral IV 07/09/18 0723 Left Antecubital  07/09/18    0723    Antecubital    1    Urethral Catheter Non-latex 16 Fr.  07/10/18    0659      less than 1    Y Chest Tube 1 and 2 1 Mediastinal 32 Fr. 2 Left Mediastinal 19 Fr.  07/10/18    0941      less than 1    ETT   07/10/18    0705 created via procedure documentation    less than 1    Arterial Line 07/10/18 Left Radial  07/10/18    0647 created via procedure documentation  Radial    less than 1         Inactive LDAs     Name:   Placement date:   Placement time:   Removal date:   Removal time:   Site:   Days:    [REMOVED] Peripheral IV 07/09/18 0729 Right Antecubital  07/09/18    0729    07/09/18    0813    Antecubital    less than 1    [REMOVED] Arterial Sheath 5 Fr. Right Radial  07/09/18    0820    07/09/18    0837    Radial    less than 1    [REMOVED] Venous Sheath 5 Fr. Right Antecubital          07/09/18    0837    Antecubital                    Hospital Medications (all)       Dose Frequency Start End    acetaminophen (TYLENOL) suppository 650 mg 650 mg Every 4 Hours PRN 7/10/2018     Sig - Route: Insert 1 suppository into the rectum Every 4 (Four) Hours As Needed for Mild Pain . - Rectal    acetaminophen (TYLENOL) tablet 650 mg 650 mg Every 4 Hours PRN  7/10/2018     Sig - Route: Take 2 tablets by mouth Every 4 (Four) Hours As Needed for Mild Pain . - Oral    albumin human 5 % bottle 1,500 mL 1,500 mL As Needed 7/10/2018 7/11/2018    Sig - Route: Infuse 1,500 mL into a venous catheter As Needed (CVR Protocol). - Intravenous    ALPRAZolam (XANAX) tablet 0.25 mg 0.25 mg Every 8 Hours PRN 7/10/2018 7/20/2018    Sig - Route: Take 1 tablet by mouth Every 8 (Eight) Hours As Needed for Anxiety. - Oral    aspirin EC tablet 81 mg 81 mg Daily 7/11/2018     Sig - Route: Take 1 tablet by mouth Daily. - Oral    atorvastatin (LIPITOR) tablet 40 mg 40 mg Nightly 7/10/2018     Sig - Route: Take 2 tablets by mouth Every Night. - Oral    bisacodyl (DULCOLAX) EC tablet 10 mg 10 mg Daily PRN 7/10/2018     Sig - Route: Take 2 tablets by mouth Daily As Needed for Constipation. - Oral    bisacodyl (DULCOLAX) suppository 10 mg 10 mg Daily PRN 7/11/2018     Sig - Route: Insert 1 suppository into the rectum Daily As Needed for Constipation. - Rectal    ceFAZolin in dextrose (ANCEF) IVPB solution 2 g (MAR Hold) ((MAR Hold) since 7/10/2018  6:36 AM) 2 g On Call to O.R. 7/10/2018 7/10/2018    Sig - Route: Infuse 100 mL into a venous catheter On Call to the Operating Room. - Intravenous    ceFAZolin in dextrose (ANCEF) IVPB solution 2 g 2 g Every 8 Hours 7/10/2018 7/12/2018    Sig - Route: Infuse 100 mL into a venous catheter Every 8 (Eight) Hours. - Intravenous    chlorhexidine (PERIDEX) 0.12 % solution 15 mL 15 mL Once 7/10/2018 7/10/2018    Sig - Route: Apply 15 mL to the mouth or throat 1 (One) Time. - Mouth/Throat    chlorhexidine (PERIDEX) 0.12 % solution 15 mL 15 mL Every 12 Hours 7/10/2018     Sig - Route: Apply 15 mL to the mouth or throat Every 12 (Twelve) Hours. - Mouth/Throat    clevidipine (CLEVIPREX) infusion 0.5 mg/mL 2-32 mg/hr Continuous PRN 7/10/2018     Sig - Route: Infuse 2-32 mg/hr into a venous catheter Continuous As Needed (See Admin Instructions). - Intravenous     "cyclobenzaprine (FLEXERIL) tablet 10 mg 10 mg Every 8 Hours PRN 7/11/2018     Sig - Route: Take 1 tablet by mouth Every 8 (Eight) Hours As Needed for Muscle Spasms. - Oral    dexmedetomidine (PRECEDEX) 400 mcg/100 mL (4 mcg/mL) infusion 0.2-1.5 mcg/kg/hr × 115 kg Titrated 7/10/2018     Sig - Route: Infuse .5 mcg/hr into a venous catheter Dose Adjusted By Provider As Needed. - Intravenous    DOPamine 400 mg/250 mL (1.6 mg/mL) infusion 2-20 mcg/kg/min × 115 kg Continuous PRN 7/10/2018     Sig - Route: Infuse 230-2,300 mcg/min into a venous catheter Continuous As Needed (CVR Protocol). - Intravenous    EPINEPHrine (ADRENALIN) 5,000 mcg in sodium chloride 0.9 % 250 mL (20 mcg/mL) infusion 0.02-0.3 mcg/kg/min × 115 kg Continuous PRN 7/10/2018     Sig - Route: Infuse 2.3-34.5 mcg/min into a venous catheter Continuous As Needed (See Admin Instructions). - Intravenous    furosemide (LASIX) injection 40 mg 40 mg Every 6 Hours PRN 7/10/2018     Sig - Route: Infuse 4 mL into a venous catheter Every 6 (Six) Hours As Needed (For PCWP or PAD greater than 15). - Intravenous    HYDROcodone-acetaminophen (NORCO) 5-325 MG per tablet 2 tablet 2 tablet Every 4 Hours PRN 7/10/2018 7/20/2018    Sig - Route: Take 2 tablets by mouth Every 4 (Four) Hours As Needed for Moderate Pain . - Oral    insulin regular (HumuLIN R,NovoLIN R) 100 Units in sodium chloride 0.9 % 100 mL (1 Units/mL) infusion 0-50 Units/hr Continuous PRN 7/10/2018     Sig - Route: Infuse 0-50 Units/hr into a venous catheter Continuous As Needed (Start if BG greater that 150 mg/dL). - Intravenous    Magesium Sulfate 1 gram infusion - Mg 1.6-1.9 mg/dL 1 g As Needed 7/10/2018     Sig - Route: Infuse 100 mL into a venous catheter As Needed (Mg 1.6-1.9 mg/dL.). - Intravenous    Linked Group 1:  \"Or\" Linked Group Details        magnesium hydroxide (MILK OF MAGNESIA) suspension 2400 mg/10mL 10 mL 10 mL Daily PRN 7/11/2018     Sig - Route: Take 10 mL by mouth Daily As Needed " "for Constipation. - Oral    Magnesium Sulfate 2 gram infusion - Mg less than or equal to 1.5 mg/dL 2 g As Needed 7/10/2018     Sig - Route: Infuse 50 mL into a venous catheter As Needed (for Mg less than or equal 1.5 mg/dL). - Intravenous    Linked Group 1:  \"Or\" Linked Group Details        magnesium sulfate in D5W 1g/100mL (PREMIX) 1 g Every 8 Hours 7/10/2018 7/11/2018    Sig - Route: Infuse 100 mL into a venous catheter Every 8 (Eight) Hours. - Intravenous    meperidine (DEMEROL) injection 25 mg 25 mg Every 4 Hours PRN 7/10/2018 7/10/2018    Sig - Route: Infuse 1 mL into a venous catheter Every 4 (Four) Hours As Needed for Shivering. - Intravenous    metoclopramide (REGLAN) injection 10 mg 10 mg Every 6 Hours 7/10/2018 7/11/2018    Sig - Route: Infuse 2 mL into a venous catheter Every 6 (Six) Hours. - Intravenous    metoprolol tartrate (LOPRESSOR) tablet 12.5 mg 12.5 mg On Call to O.R. 7/10/2018 7/10/2018    Sig - Route: Take 0.5 tablets by mouth On Call to the Operating Room. - Oral    metoprolol tartrate (LOPRESSOR) tablet 12.5 mg 12.5 mg Every 12 Hours Scheduled 7/10/2018     Sig - Route: Take 0.5 tablets by mouth Every 12 (Twelve) Hours. - Oral    midazolam (VERSED) injection 2 mg 2 mg Every 1 Hour PRN 7/10/2018     Sig - Route: Infuse 2 mL into a venous catheter Every 1 (One) Hour As Needed for Sedation (while on ventilator). - Intravenous    milrinone 20 mg/100 mL (0.2 mg/mL) in 5 % dextrose infusion 0.25-0.75 mcg/kg/min × 115 kg Continuous PRN 7/10/2018     Sig - Route: Infuse 28.75-86.25 mcg/min into a venous catheter Continuous As Needed (See Admin Instructions). - Intravenous    morphine injection 1 mg 1 mg Every 4 Hours PRN 7/10/2018 7/20/2018    Sig - Route: Infuse 0.5 mL into a venous catheter Every 4 (Four) Hours As Needed for Moderate Pain . - Intravenous    Linked Group 2:  \"And\" Linked Group Details        Morphine sulfate (PF) injection 4 mg 4 mg Every 30 Minutes PRN 7/10/2018 7/20/2018    Sig - " "Route: Infuse 2 mL into a venous catheter Every 30 (Thirty) Minutes As Needed for Severe Pain  (while on ventilator). - Intravenous    mupirocin (BACTROBAN) 2 % nasal ointment  2 Times Daily 7/10/2018     Sig - Route: by Each Nare route 2 (Two) Times a Day. - Each Nare    naloxone (NARCAN) injection 0.4 mg 0.4 mg Every 5 Minutes PRN 7/10/2018     Sig - Route: Infuse 1 mL into a venous catheter Every 5 (Five) Minutes As Needed for Respiratory Depression. - Intravenous    Linked Group 2:  \"And\" Linked Group Details        niCARdipine (CARDENE) 25 mg/250 mL (0.1 mg/mL) 0.9% NS VTB infusion 5-15 mg/hr Continuous PRN 7/10/2018     Sig - Route: Infuse 5-15 mg/hr into a venous catheter Continuous As Needed (See Admin Instructions). - Intravenous    nitroglycerin 50 mg/250 mL (0.2 mg/mL) infusion 5-200 mcg/min Titrated 7/10/2018     Sig - Route: Infuse 5-200 mcg/min into a venous catheter Dose Adjusted By Provider As Needed. - Intravenous    norepinephrine (LEVOPHED) 8 mg/250 mL (32 mcg/mL) in sodium chloride 0.9% infusion (premix) 0.02-0.3 mcg/kg/min × 115 kg Continuous PRN 7/10/2018     Sig - Route: Infuse 2.3-34.5 mcg/min into a venous catheter Continuous As Needed (See Admin Instructions). - Intravenous    ondansetron (ZOFRAN) injection 4 mg 4 mg Every 6 Hours PRN 7/10/2018     Sig - Route: Infuse 2 mL into a venous catheter Every 6 (Six) Hours As Needed for Nausea or Vomiting. - Intravenous    oxyCODONE (ROXICODONE) immediate release tablet 10 mg 10 mg Every 4 Hours PRN 7/10/2018 7/20/2018    Sig - Route: Take 2 tablets by mouth Every 4 (Four) Hours As Needed for Severe Pain . - Oral    pantoprazole (PROTONIX) EC tablet 40 mg 40 mg Daily 7/11/2018     Sig - Route: Take 1 tablet by mouth Daily. - Oral    phenylephrine (NAM-SYNEPHRINE) 50 mg/250 mL (0.2 mg/mL) in 0.9% NS  infusion 0.2-3 mcg/kg/min × 115 kg Continuous PRN 7/10/2018     Sig - Route: Infuse  mcg/min into a venous catheter Continuous As Needed (See " "Admin Instructions). - Intravenous    potassium chloride (KLOR-CON) packet 40 mEq 40 mEq As Needed 7/10/2018     Sig - Route: Take 40 mEq by mouth As Needed (potassium replacement, see admin instructions). - Oral    Linked Group 3:  \"Or\" Linked Group Details        potassium chloride (MICRO-K) CR capsule 40 mEq 40 mEq As Needed 7/10/2018     Sig - Route: Take 4 capsules by mouth As Needed (potassium replacement.  see admin instructions). - Oral    Linked Group 3:  \"Or\" Linked Group Details        potassium chloride 10 mEq in 100 mL IVPB 10 mEq Every 1 Hour PRN 7/10/2018     Sig - Route: Infuse 100 mL into a venous catheter Every 1 (One) Hour As Needed (for K+ less than or equal to 3.1). - Intravenous    Linked Group 4:  \"Or\" Linked Group Details        potassium chloride 10 mEq in 100 mL IVPB 10 mEq Every 1 Hour PRN 7/10/2018     Sig - Route: Infuse 100 mL into a venous catheter Every 1 (One) Hour As Needed (for K+ 3.2 - 3.6). - Intravenous    Linked Group 4:  \"Or\" Linked Group Details        potassium chloride 20 mEq in 50 mL IVPB 20 mEq Every 1 Hour PRN 7/10/2018     Sig - Route: Infuse 50 mL into a venous catheter Every 1 (One) Hour As Needed (for K+ less than or equal to 3.1). - Intravenous    potassium chloride 20 mEq in 50 mL IVPB 20 mEq Every 1 Hour PRN 7/10/2018     Sig - Route: Infuse 50 mL into a venous catheter Every 1 (One) Hour As Needed (for K+ 3.2 - 3.6). - Intravenous    potassium chloride 20 mEq in 50 mL IVPB 20 mEq Every 1 Hour PRN 7/10/2018     Sig - Route: Infuse 50 mL into a venous catheter Every 1 (One) Hour As Needed (for K+ 3.7 - 4.0). - Intravenous    promethazine (PHENERGAN) injection 12.5 mg 12.5 mg Every 6 Hours PRN 7/10/2018     Sig - Route: Infuse 0.5 mL into a venous catheter Every 6 (Six) Hours As Needed for Nausea or Vomiting. - Intravenous    Linked Group 5:  \"Or\" Linked Group Details        promethazine (PHENERGAN) tablet 12.5 mg 12.5 mg Every 6 Hours PRN 7/10/2018     Sig - Route: " "Take 0.5 tablets by mouth Every 6 (Six) Hours As Needed for Nausea or Vomiting. - Oral    Linked Group 5:  \"Or\" Linked Group Details        propofol (DIPRIVAN) infusion 10 mg/mL 100 mL 5-50 mcg/kg/min × 115 kg Continuous PRN 7/10/2018     Sig - Route: Infuse 575-5,750 mcg/min into a venous catheter Continuous As Needed (See Admin Instructions). - Intravenous    sennosides-docusate sodium (SENOKOT-S) 8.6-50 MG tablet 2 tablet 2 tablet Nightly 7/11/2018     Sig - Route: Take 2 tablets by mouth Every Night. - Oral    sodium chloride 0.9 % flush 30 mL 30 mL Once As Needed 7/10/2018     Sig - Route: Infuse 30 mL into a venous catheter 1 (One) Time As Needed for Line Care. - Intravenous    sodium chloride 0.9 % infusion 30 mL/hr Continuous 7/10/2018     Sig - Route: Infuse 30 mL/hr into a venous catheter Continuous. - Intravenous    sodium chloride 0.9 % infusion 30 mL/hr Continuous PRN 7/10/2018     Sig - Route: Infuse 30 mL/hr into a venous catheter Continuous As Needed (if insulin infusion rate is insufficient to maintain IV patency.). - Intravenous    vasopressin (PITRESSIN) 20 Units in sodium chloride 0.9 % 100 mL (0.2 Units/mL) infusion 0.02-0.1 Units/min Continuous PRN 7/10/2018     Sig - Route: Infuse 0.02-0.1 Units/min into a venous catheter Continuous As Needed (See Admin Instructions). - Intravenous    acetaminophen (TYLENOL) tablet 650 mg (Discontinued) 650 mg Every 4 Hours PRN 7/9/2018 7/10/2018    Sig - Route: Take 2 tablets by mouth Every 4 (Four) Hours As Needed for Mild Pain  or Fever (temperature greater than 101F). - Oral    ALPRAZolam (XANAX) tablet 0.25 mg (Discontinued) 0.25 mg Every 8 Hours PRN 7/9/2018 7/10/2018    Sig - Route: Take 1 tablet by mouth Every 8 (Eight) Hours As Needed for Anxiety. - Oral    aspirin chewable tablet 81 mg (Discontinued) 81 mg Daily 7/9/2018 7/10/2018    Sig - Route: Chew 1 tablet Daily. - Oral    atorvastatin (LIPITOR) tablet 10 mg (Discontinued) 10 mg Daily 7/9/2018 " 7/10/2018    Sig - Route: Take 1 tablet by mouth Daily. - Oral    butalbital-acetaminophen-caffeine (FIORICET, ESGIC) -40 MG per tablet 1 tablet (Discontinued) 1 tablet Every 6 Hours PRN 7/9/2018 7/10/2018    Sig - Route: Take 1 tablet by mouth Every 6 (Six) Hours As Needed for Headache. - Oral    chlorhexidine (PERIDEX) 0.12 % solution 15 mL (Discontinued) 15 mL Every 12 Hours Scheduled 7/9/2018 7/10/2018    Sig - Route: Apply 15 mL to the mouth or throat Every 12 (Twelve) Hours. - Mouth/Throat    Chlorhexidine Gluconate Cloth 2 % pads 1 application (Discontinued) 1 application Every 12 Hours PRN 7/9/2018 7/10/2018    Sig - Route: Apply 1 application topically Every 12 (Twelve) Hours As Needed (12 hours night before surgery then repeat in AM prior to surgery.). - Topical    Reason for Discontinue: Patient Transfer    Chlorhexidine Gluconate Cloth 2 % pads 1 application (Discontinued) 1 application Every 12 Hours 7/9/2018 7/10/2018    Sig - Route: Apply 1 application topically Every 12 (Twelve) Hours. - Topical    diphenhydrAMINE (BENADRYL) capsule 25 mg (Discontinued) 25 mg Nightly PRN 7/9/2018 7/10/2018    Sig - Route: Take 1 capsule by mouth At Night As Needed for Sleep. - Oral    escitalopram (LEXAPRO) tablet 10 mg (Discontinued) 10 mg Daily 7/9/2018 7/10/2018    Sig - Route: Take 1 tablet by mouth Daily. - Oral    fentaNYL citrate (PF) (SUBLIMAZE) injection (Discontinued)  As Needed 7/9/2018 7/9/2018    Sig: As Needed.    Reason for Discontinue: Patient Discharge    gelatin absorbable (GELFOAM) sponge (Discontinued)  As Needed 7/10/2018 7/10/2018    Sig: As Needed.    Reason for Discontinue: Patient Discharge    heparin (porcine) 5000 UNIT/ML injection (Discontinued)  As Needed 7/10/2018 7/10/2018    Sig: As Needed.    Reason for Discontinue: Patient Discharge    HYDROcodone-acetaminophen (NORCO) 5-325 MG per tablet 1 tablet (Discontinued) 1 tablet Every 4 Hours PRN 7/9/2018 7/10/2018    Sig - Route: Take  1 tablet by mouth Every 4 (Four) Hours As Needed for Moderate Pain . - Oral    insulin regular (HumuLIN R,NovoLIN R) 100 Units in sodium chloride 0.9 % 100 mL (1 Units/mL) infusion (Discontinued) 0-50 Units/hr Titrated 7/10/2018 7/10/2018    Sig - Route: Infuse 0-50 Units/hr into a venous catheter Dose Adjusted By Provider As Needed. - Intravenous    Reason for Discontinue: Patient Transfer    iopamidol (ISOVUE-370) 76 % injection (Discontinued)  As Needed 7/9/2018 7/9/2018    Sig: As Needed.    Reason for Discontinue: Patient Discharge    lidocaine (XYLOCAINE) 2% injection (Discontinued)  As Needed 7/9/2018 7/9/2018    Sig: As Needed.    Reason for Discontinue: Patient Discharge    lidocaine PF 1% (XYLOCAINE) injection 0.1 mL (Discontinued) 0.1 mL Once As Needed 7/9/2018 7/9/2018    Sig - Route: Inject 0.1 mL into the skin 1 (One) Time As Needed (for IV access). - Intradermal    Reason for Discontinue: Patient Transfer    midazolam (VERSED) injection (Discontinued)  As Needed 7/9/2018 7/9/2018    Sig: As Needed.    Reason for Discontinue: Patient Discharge    mupirocin (BACTROBAN) 2 % nasal ointment 1 application (Discontinued) 1 application Every 12 Hours 7/9/2018 7/10/2018    Sig - Route: 1 application by Each Nare route Every 12 (Twelve) Hours. - Each Nare    Reason for Discontinue: Patient Transfer    mupirocin (BACTROBAN) 2 % nasal ointment 1 application (Discontinued) 1 application Every 12 Hours Scheduled 7/9/2018 7/10/2018    Sig - Route: 1 application by Each Nare route Every 12 (Twelve) Hours. - Each Nare    nitroglycerin (NITROSTAT) SL tablet 0.4 mg (Discontinued) 0.4 mg Every 5 Minutes PRN 7/9/2018 7/10/2018    Sig - Route: Place 1 tablet under the tongue Every 5 (Five) Minutes As Needed for Chest Pain. - Sublingual    ondansetron (ZOFRAN) injection 4 mg (Discontinued) 4 mg Every 6 Hours PRN 7/9/2018 7/10/2018    Sig - Route: Infuse 2 mL into a venous catheter Every 6 (Six) Hours As Needed for Nausea or  "Vomiting. - Intravenous    Linked Group 6:  \"Or\" Linked Group Details        ondansetron (ZOFRAN) tablet 4 mg (Discontinued) 4 mg Every 6 Hours PRN 7/9/2018 7/10/2018    Sig - Route: Take 1 tablet by mouth Every 6 (Six) Hours As Needed for Nausea or Vomiting. - Oral    Linked Group 6:  \"Or\" Linked Group Details        ondansetron ODT (ZOFRAN-ODT) disintegrating tablet 4 mg (Discontinued) 4 mg Every 6 Hours PRN 7/9/2018 7/10/2018    Sig - Route: Take 1 tablet by mouth Every 6 (Six) Hours As Needed for Nausea or Vomiting. - Oral    Linked Group 6:  \"Or\" Linked Group Details        papaverine injection (Discontinued)  As Needed 7/10/2018 7/10/2018    Sig: As Needed.    Reason for Discontinue: Patient Discharge    protamine injection (Discontinued)  As Needed 7/10/2018 7/10/2018    Sig: As Needed.    Reason for Discontinue: Patient Discharge    sodium chloride (NS) irrigation solution (Discontinued)  As Needed 7/10/2018 7/10/2018    Sig: As Needed.    Reason for Discontinue: Patient Discharge    sodium chloride 0.9 % flush 1-10 mL (Discontinued) 1-10 mL As Needed 7/9/2018 7/9/2018    Sig - Route: Infuse 1-10 mL into a venous catheter As Needed for Line Care. - Intravenous    Reason for Discontinue: Patient Transfer    sodium chloride 0.9 % infusion (Discontinued) 75 mL/hr Continuous 7/9/2018 7/9/2018    Sig - Route: Infuse 75 mL/hr into a venous catheter Continuous. - Intravenous    sodium chloride 0.9 % infusion (Discontinued) 75 mL/hr Continuous 7/9/2018 7/9/2018    Sig - Route: Infuse 75 mL/hr into a venous catheter Continuous. - Intravenous    temazepam (RESTORIL) capsule 15 mg (Discontinued) 15 mg Nightly PRN 7/9/2018 7/10/2018    Sig - Route: Take 1 capsule by mouth At Night As Needed for Sleep. - Oral    valsartan (DIOVAN) 80 mg, hydrochlorothiazide (HYDRODIURIL) 12.5 mg (Discontinued)  Daily 7/9/2018 7/10/2018    Sig - Route: Take  by mouth Daily. - Oral    verapamil (ISOPTIN) 500 mcg, nitroglycerin (TRIDIL) 200 " mcg, heparin (porcine) 3,000 Units radial artery injection (Discontinued)  As Needed 7/9/2018 7/9/2018    Sig: As Needed.    Reason for Discontinue: Patient Discharge          Orders (last 24 hrs)     Start     Ordered    07/13/18 0600  Clean Midsternal Incision & Chest Tube Sites With Hibiclens Beginning on POD 3  Daily      07/10/18 1036    07/13/18 0600  XR Chest PA & Lateral  1 Time Imaging      07/10/18 1036    07/12/18 1200  Remove Midsternal Dressing on POD 3. Patient May Shower With Dressing On. If Dressing Becomes Loose or Wet Remove on POD 2  Once      07/10/18 1036    07/12/18 1200  Leave Incisions Open to Air Unless Draining or Edges Are Not Approximated  Continuous      07/10/18 1036    07/12/18 0600  Change Chest Dressing Daily While Intubated  Daily      07/10/18 1036    07/12/18 0600  Incision Care - Cleanse With Normal Saline & 4x4 Gauze Using Sterile Technique  Daily      07/10/18 1036    07/12/18 0600  Chest Tube & Pacing Wire Sites - Cleanse With Normal Saline & 4x4 Gauze Using Sterile Technique  Daily      07/10/18 1036    07/12/18 0600  CBC (No Diff)  Daily      07/10/18 1036    07/12/18 0600  Basic Metabolic Panel  Daily      07/10/18 1036    07/11/18 2100  sennosides-docusate sodium (SENOKOT-S) 8.6-50 MG tablet 2 tablet  Nightly      07/10/18 1036    07/11/18 1400  Intake & Output  Every Shift      07/10/18 1036    07/11/18 1200  Vital Signs  Every 4 Hours     Comments:  Perform Vitals Less Frequently Only if Patient Stable      07/10/18 1036    07/11/18 0900  aspirin EC tablet 81 mg  Daily      07/10/18 1036    07/11/18 0900  pantoprazole (PROTONIX) EC tablet 40 mg  Daily      07/10/18 1036    07/11/18 0800  Wean & Extubate Per Rapid Wean and Extubation Protocol  Every Morning      07/10/18 1036    07/11/18 0600  XR Chest 1 View  Daily      07/10/18 1036    07/11/18 0600  ECG 12 Lead  Daily      07/10/18 1036    07/11/18 0600  RN to Place Order For STAT Chest X-Ray When Chest Tubes Are  Removed  Once      07/10/18 1036    07/11/18 0300  CBC & Differential  Once      07/10/18 1036    07/11/18 0300  Renal Function Panel  Once      07/10/18 1036    07/11/18 0300  Magnesium  Once      07/10/18 1036    07/11/18 0300  Protime-INR  Once      07/10/18 1036    07/11/18 0000  bisacodyl (DULCOLAX) suppository 10 mg  Daily PRN      07/10/18 1036    07/11/18 0000  magnesium hydroxide (MILK OF MAGNESIA) suspension 2400 mg/10mL 10 mL  Daily PRN      07/10/18 1036    07/11/18 0000  cyclobenzaprine (FLEXERIL) tablet 10 mg  Every 8 Hours PRN      07/10/18 1036    07/10/18 2330  Patient May Use Home CPAP / BIPAP For Sleep  At Bedtime - RT      07/10/18 1036    07/10/18 2100  atorvastatin (LIPITOR) tablet 40 mg  Nightly      07/10/18 1036    07/10/18 1300  Ambulate Patient  3 Times Daily      07/10/18 1036    07/10/18 1200  Check Peripheral Pulses Every 4 Hours  Every 4 Hours      07/10/18 1036    07/10/18 1200  Cardiac Output Parameters Every 2 Hours Until 24 Hours Post Op  Every 2 Hours      07/10/18 1036    07/10/18 1120  Scan Slide  Once      07/10/18 1119    07/10/18 1115  sodium chloride 0.9 % infusion  Continuous      07/10/18 1036    07/10/18 1115  nitroglycerin 50 mg/250 mL (0.2 mg/mL) infusion  Titrated      07/10/18 1036    07/10/18 1115  mupirocin (BACTROBAN) 2 % nasal ointment  2 Times Daily      07/10/18 1036    07/10/18 1115  chlorhexidine (PERIDEX) 0.12 % solution 15 mL  Every 12 Hours      07/10/18 1036    07/10/18 1115  metoclopramide (REGLAN) injection 10 mg  Every 6 Hours      07/10/18 1036    07/10/18 1115  dexmedetomidine (PRECEDEX) 400 mcg/100 mL (4 mcg/mL) infusion  Titrated      07/10/18 1036    07/10/18 1115  ceFAZolin in dextrose (ANCEF) IVPB solution 2 g  Every 8 Hours      07/10/18 1036    07/10/18 1115  metoprolol tartrate (LOPRESSOR) tablet 12.5 mg  Every 12 Hours Scheduled      07/10/18 1036    07/10/18 1115  magnesium sulfate in D5W 1g/100mL (PREMIX)  Every 8 Hours      07/10/18 1036     07/10/18 1107  Blood Gas, Arterial  Once      07/10/18 1107    07/10/18 1105  POC Glucose Once  Once      07/10/18 1104    07/10/18 1100  Vital Signs Every Hour Until 24 Hours Post Op  Every Hour     Comments:  Perform Vitals Less Frequently Only if Patient Stable      07/10/18 1036    07/10/18 1100  Check Peripheral Pulses Every 1 Hour x4  Every Hour      07/10/18 1036    07/10/18 1100  Intake & Output Every Hour Until 24 Hours Post Op  Every Hour      07/10/18 1036    07/10/18 1100  Cardiac Output Parameters On Admission, Then Every 1 Hour x4  Every Hour      07/10/18 1036    07/10/18 1036  Transfer Patient  Once      07/10/18 1036    07/10/18 1036  Vital Signs & Post-Op Checks Every 15 Minutes x2, Every 30 Minutes x4  Per Hospital Policy     Comments:  Perform Vitals Less Frequently Only if Patient Stable    07/10/18 1036    07/10/18 1036  Intake & Output Every 15 Minutes x2, Every 30 Minutes x4  Every Shift      07/10/18 1036    07/10/18 1036  Cardiac Monitoring  Continuous      07/10/18 1036    07/10/18 1036  Continuous Pulse Oximetry  Continuous      07/10/18 1036    07/10/18 1036  Turn Patient Every 2 Hours or Begin Bed Rotation Once Hemodynamically Stable  Now Then Every 2 Hours      07/10/18 1036    07/10/18 1036  Advance PROM to AROM  Now Then Every 4 Hours      07/10/18 1036    07/10/18 1036  Up in Chair for Meals  Until Discontinued      07/10/18 1036    07/10/18 1036  Discontinue NG After Extubation  Once      07/10/18 1036    07/10/18 1036  Continue Indwelling Urinary Catheter  Once      07/10/18 1036    07/10/18 1036  Assess Need for Indwelling Urinary Catheter - Follow Removal Protocol  Continuous         07/10/18 1036    07/10/18 1036  Catheter Care  Every Shift      07/10/18 1036    07/10/18 1036  Chest & Mediastinal Tubes to 20cm Continuous Suction  Once      07/10/18 1036    07/10/18 1036  Begin Rewarming with Thermal Unit As Needed For Temp Less Than 96F  Once      07/10/18 1036    07/10/18  1036  Heart Hugger Vest  Continuous      07/10/18 1036    07/10/18 1036  Keep Chest Incisions Covered Until Extubated  Continuous      07/10/18 1036    07/10/18 1036  If Chest Dressing Removed During Chest Tube Removal, Clean Incision With Normal Saline & Redress Until 48 Hours Post Op  Continuous      07/10/18 1036    07/10/18 1036  Notify Surgeon if Drainage From Surgical Incision Site  Until Discontinued      07/10/18 1036    07/10/18 1036  ISOLATE PACER WIRES  Continuous      07/10/18 1036    07/10/18 1036  Notify Provider - Urine Output  Until Discontinued      07/10/18 1036    07/10/18 1036  Notify Provider - Chest Tube Output  Until Discontinued      07/10/18 1036    07/10/18 1036  Initial Ventilator Settings Per Pulmonologist / Anesthesiologist  Once      07/10/18 1036    07/10/18 1036  Incentive Spirometry  Every Hour While Awake      07/10/18 1036    07/10/18 1036  EZ-Pap  Once      07/10/18 1036    07/10/18 1036  Wean & Extubate Per Rapid Wean & Extubate Protocol  Start Now      07/10/18 1036    07/10/18 1036  NPO Diet  Diet Effective Now      07/10/18 1036    07/10/18 1036  Advance Diet as Tolerated  Until Discontinued      07/10/18 1036    07/10/18 1036  Cardiac Rehab Evaluation and Enrollment  Once     Provider:  (Not yet assigned)    07/10/18 1036    07/10/18 1036  Consult to Case Management /   Once     Provider:  (Not yet assigned)    07/10/18 1036    07/10/18 1036  PT Consult: Eval & Treat  Once      07/10/18 1036    07/10/18 1036  XR Chest 1 View  1 Time Imaging      07/10/18 1036    07/10/18 1036  ECG 12 Lead  STAT      07/10/18 1036    07/10/18 1036  Protime-INR  STAT      07/10/18 1036    07/10/18 1036  aPTT  STAT      07/10/18 1036    07/10/18 1036  Calcium, Ionized  STAT      07/10/18 1036    07/10/18 1036  Blood Gas, Arterial  STAT      07/10/18 1036    07/10/18 1036  CBC & Differential  STAT      07/10/18 1036    07/10/18 1036  Fibrinogen  STAT      07/10/18 1036    07/10/18  1036  CBC (No Diff)  Now Then Every 4 Hours      07/10/18 1036    07/10/18 1036  Renal Function Panel  Now Then Every 4 Hours      07/10/18 1036    07/10/18 1036  Magnesium  Now Then Every 4 Hours      07/10/18 1036    07/10/18 1036  Potassium  Now Then Every 4 Hours      07/10/18 1036    07/10/18 1036  Code Status and Medical Interventions:  Continuous      07/10/18 1036    07/10/18 1036  CBC Auto Differential  PROCEDURE ONCE      07/10/18 1037    07/10/18 1035  morphine injection 1 mg  Every 4 Hours PRN      07/10/18 1036    07/10/18 1035  naloxone (NARCAN) injection 0.4 mg  Every 5 Minutes PRN      07/10/18 1036    07/10/18 1035  potassium chloride (MICRO-K) CR capsule 40 mEq  As Needed      07/10/18 1036    07/10/18 1035  potassium chloride (KLOR-CON) packet 40 mEq  As Needed      07/10/18 1036    07/10/18 1035  potassium chloride 10 mEq in 100 mL IVPB  Every 1 Hour PRN      07/10/18 1036    07/10/18 1035  potassium chloride 10 mEq in 100 mL IVPB  Every 1 Hour PRN      07/10/18 1036    07/10/18 1035  Magnesium Sulfate 2 gram infusion - Mg less than or equal to 1.5 mg/dL  As Needed      07/10/18 1036    07/10/18 1035  Magesium Sulfate 1 gram infusion - Mg 1.6-1.9 mg/dL  As Needed      07/10/18 1036    07/10/18 1035  promethazine (PHENERGAN) tablet 12.5 mg  Every 6 Hours PRN      07/10/18 1036    07/10/18 1035  promethazine (PHENERGAN) injection 12.5 mg  Every 6 Hours PRN      07/10/18 1036    07/10/18 1035  vasopressin (PITRESSIN) 20 Units in sodium chloride 0.9 % 100 mL (0.2 Units/mL) infusion  Continuous PRN      07/10/18 1036    07/10/18 1035  sodium chloride 0.9 % flush 30 mL  Once As Needed      07/10/18 1036    07/10/18 1035  sodium chloride 0.9 % infusion  Continuous PRN      07/10/18 1036    07/10/18 1035  phenylephrine (NAM-SYNEPHRINE) 50 mg/250 mL (0.2 mg/mL) in 0.9% NS  infusion  Continuous PRN      07/10/18 1036    07/10/18 1035  niCARdipine (CARDENE) 25 mg/250 mL (0.1 mg/mL) 0.9% NS VTB infusion   Continuous PRN      07/10/18 1036    07/10/18 1035  norepinephrine (LEVOPHED) 8 mg/250 mL (32 mcg/mL) in sodium chloride 0.9% infusion (premix)  Continuous PRN      07/10/18 1036    07/10/18 1035  oxyCODONE (ROXICODONE) immediate release tablet 10 mg  Every 4 Hours PRN      07/10/18 1036    07/10/18 1035  Morphine sulfate (PF) injection 4 mg  Every 30 Minutes PRN      07/10/18 1036    07/10/18 1035  potassium chloride 20 mEq in 50 mL IVPB  Every 1 Hour PRN      07/10/18 1036    07/10/18 1035  potassium chloride 20 mEq in 50 mL IVPB  Every 1 Hour PRN      07/10/18 1036    07/10/18 1035  potassium chloride 20 mEq in 50 mL IVPB  Every 1 Hour PRN      07/10/18 1036    07/10/18 1035  ondansetron (ZOFRAN) injection 4 mg  Every 6 Hours PRN      07/10/18 1036    07/10/18 1035  propofol (DIPRIVAN) infusion 10 mg/mL 100 mL  Continuous PRN      07/10/18 1036    07/10/18 1035  DOPamine 400 mg/250 mL (1.6 mg/mL) infusion  Continuous PRN      07/10/18 1036    07/10/18 1035  EPINEPHrine (ADRENALIN) 5,000 mcg in sodium chloride 0.9 % 250 mL (20 mcg/mL) infusion  Continuous PRN      07/10/18 1036    07/10/18 1035  milrinone 20 mg/100 mL (0.2 mg/mL) in 5 % dextrose infusion  Continuous PRN      07/10/18 1036    07/10/18 1035  HYDROcodone-acetaminophen (NORCO) 5-325 MG per tablet 2 tablet  Every 4 Hours PRN      07/10/18 1036    07/10/18 1035  insulin regular (HumuLIN R,NovoLIN R) 100 Units in sodium chloride 0.9 % 100 mL (1 Units/mL) infusion  Continuous PRN      07/10/18 1036    07/10/18 1035  midazolam (VERSED) injection 2 mg  Every 1 Hour PRN      07/10/18 1036    07/10/18 1035  furosemide (LASIX) injection 40 mg  Every 6 Hours PRN      07/10/18 1036    07/10/18 1035  meperidine (DEMEROL) injection 25 mg  Every 4 Hours PRN      07/10/18 1036    07/10/18 1035  clevidipine (CLEVIPREX) infusion 0.5 mg/mL  Continuous PRN      07/10/18 1036    07/10/18 1035  bisacodyl (DULCOLAX) EC tablet 10 mg  Daily PRN      07/10/18 1036    07/10/18  1035  acetaminophen (TYLENOL) tablet 650 mg  Every 4 Hours PRN      07/10/18 1036    07/10/18 1035  acetaminophen (TYLENOL) suppository 650 mg  Every 4 Hours PRN      07/10/18 1036    07/10/18 1035  ALPRAZolam (XANAX) tablet 0.25 mg  Every 8 Hours PRN      07/10/18 1036    07/10/18 1035  albumin human 5 % bottle 1,500 mL  As Needed      07/10/18 1036    07/10/18 0944  Tissue Pathology Exam      07/10/18 0944    07/10/18 0943  protamine injection  As Needed,   Status:  Discontinued      07/10/18 0943    07/10/18 0645  insulin regular (HumuLIN R,NovoLIN R) 100 Units in sodium chloride 0.9 % 100 mL (1 Units/mL) infusion  Titrated,   Status:  Discontinued      07/10/18 0612    07/10/18 0642  OR Blood Pickup RBC; 2 units  Once,   Status:  Canceled      07/10/18 0641    07/10/18 0641  Insert Pierce Catheter  Once,   Status:  Canceled      07/10/18 0640    07/10/18 0630  sodium chloride (NS) irrigation solution  As Needed,   Status:  Discontinued      07/10/18 0630    07/10/18 0630  papaverine injection  As Needed,   Status:  Discontinued      07/10/18 0630    07/10/18 0630  heparin (porcine) 5000 UNIT/ML injection  As Needed,   Status:  Discontinued      07/10/18 0630    07/10/18 0630  gelatin absorbable (GELFOAM) sponge  As Needed,   Status:  Discontinued      07/10/18 0630    07/10/18 0600  chlorhexidine (PERIDEX) 0.12 % solution 15 mL  Once      07/09/18 1545    07/10/18 0600  metoprolol tartrate (LOPRESSOR) tablet 12.5 mg  On Call to O.R.      07/09/18 1027    07/10/18 0600  [MAR Hold]  ceFAZolin in dextrose (ANCEF) IVPB solution 2 g  On Call to O.R.     (MAR Hold since 07/10/18 0636)    07/09/18 1027    07/10/18 0401  CBC & Differential  Once      07/10/18 0401    07/10/18 0401  Basic Metabolic Panel  Once      07/10/18 0401    07/10/18 0401  CBC Auto Differential  PROCEDURE ONCE      07/10/18 0401    07/10/18 0001  NPO Diet NPO Except: Sips with meds  Diet Effective Midnight,   Status:  Canceled      07/09/18 1027     07/09/18 2132  Transfer patient to OR on oxygen.  Until Discontinued,   Status:  Canceled      07/09/18 2131    07/09/18 2100  escitalopram (LEXAPRO) tablet 10 mg  Daily,   Status:  Discontinued      07/09/18 0959    07/09/18 2100  Weigh Patient Night Before Surgery  Once,   Status:  Canceled     Comments:  Need Actual Weight, Not Stated Weight    07/09/18 1027    07/09/18 2100  chlorhexidine (PERIDEX) 0.12 % solution 15 mL  Every 12 Hours Scheduled,   Status:  Discontinued      07/09/18 1027    07/09/18 2100  Chlorhexidine Gluconate Cloth 2 % pads 1 application  Every 12 Hours,   Status:  Discontinued      07/09/18 1027    07/09/18 2100  mupirocin (BACTROBAN) 2 % nasal ointment 1 application  Every 12 Hours Scheduled,   Status:  Discontinued      07/09/18 1027    07/09/18 1630  mupirocin (BACTROBAN) 2 % nasal ointment 1 application  Every 12 Hours,   Status:  Discontinued      07/09/18 1545    07/09/18 1601  ABO RH Specimen Verification  Once      07/09/18 1601    07/09/18 1600  Strict Intake & Output  Every 8 Hours,   Status:  Canceled      07/09/18 1027    07/09/18 1546  Follow Anesthesia Guidelines / Standing Orders  Once,   Status:  Canceled      07/09/18 1545    07/09/18 1546  Hibiclens Shower / Bath After Skin Prep  Once,   Status:  Canceled      07/09/18 1545    07/09/18 1546  Clip Hair Chin to Ankles  Once,   Status:  Canceled      07/09/18 1545    07/09/18 1545  Chlorhexidine Gluconate Cloth 2 % pads 1 application  Every 12 Hours PRN,   Status:  Discontinued      07/09/18 1545    07/09/18 1400  Instruct Patient on Coughing, Deep Breathing and Incentive Spirometry  Every 4 Hours While Awake,   Status:  Canceled      07/09/18 1027    07/09/18 1335  Duplex Vein Mapping Lower Extremity - Bilateral CAR  Once      07/09/18 1334    07/09/18 1200  Strict intake and output  Every 4 Hours,   Status:  Canceled      07/09/18 0959    07/09/18 1200  Neurovascular Checks  Every 4 Hours,   Status:  Canceled      07/09/18  1027    07/09/18 1154  Prepare RBC, 2 Units  Blood - Once      07/09/18 1153    07/09/18 1154  Prepare Platelet Pheresis, 2 Units  Blood - Once,   Status:  Canceled      07/09/18 1153    07/09/18 1030  aspirin chewable tablet 81 mg  Daily,   Status:  Discontinued      07/09/18 0959    07/09/18 1030  valsartan (DIOVAN) 80 mg, hydrochlorothiazide (HYDRODIURIL) 12.5 mg  Daily,   Status:  Discontinued      07/09/18 0959    07/09/18 1030  atorvastatin (LIPITOR) tablet 10 mg  Daily,   Status:  Discontinued      07/09/18 0959    07/09/18 1030  sodium chloride 0.9 % infusion  Continuous,   Status:  Discontinued      07/09/18 0959    07/09/18 1026  temazepam (RESTORIL) capsule 15 mg  Nightly PRN,   Status:  Discontinued      07/09/18 1027    07/09/18 1026  nitroglycerin (NITROSTAT) SL tablet 0.4 mg  Every 5 Minutes PRN,   Status:  Discontinued      07/09/18 1027    07/09/18 1026  diphenhydrAMINE (BENADRYL) capsule 25 mg  Nightly PRN,   Status:  Discontinued      07/09/18 1027    07/09/18 1026  ALPRAZolam (XANAX) tablet 0.25 mg  Every 8 Hours PRN,   Status:  Discontinued      07/09/18 1027    07/09/18 0959  butalbital-acetaminophen-caffeine (FIORICET, ESGIC) -40 MG per tablet 1 tablet  Every 6 Hours PRN,   Status:  Discontinued      07/09/18 0959    07/09/18 0959  Vital Signs  As Needed,   Status:  Canceled      07/09/18 0959    07/09/18 0959  Check distal extremity for warmth, color, sensation and pulses with each vital sign and site check.  As Needed,   Status:  Canceled      07/09/18 0959    07/09/18 0959  Change site dressing  As Needed,   Status:  Canceled      07/09/18 0959    07/09/18 0959  acetaminophen (TYLENOL) tablet 650 mg  Every 4 Hours PRN,   Status:  Discontinued      07/09/18 0959    07/09/18 0959  HYDROcodone-acetaminophen (NORCO) 5-325 MG per tablet 1 tablet  Every 4 Hours PRN,   Status:  Discontinued      07/09/18 0959 07/09/18 0959  ondansetron (ZOFRAN) tablet 4 mg  Every 6 Hours PRN,   Status:   Discontinued      07/09/18 0959 07/09/18 0959  ondansetron ODT (ZOFRAN-ODT) disintegrating tablet 4 mg  Every 6 Hours PRN,   Status:  Discontinued      07/09/18 0959 07/09/18 0959  ondansetron (ZOFRAN) injection 4 mg  Every 6 Hours PRN,   Status:  Discontinued      07/09/18 0959 07/09/18 0655  Oxygen Therapy- Nasal Cannula; Titrate for SPO2: 92%  Continuous PRN,   Status:  Canceled      07/09/18 0655    Unscheduled  Check Peripheral Pulses As Needed  As Needed      07/10/18 1036    Unscheduled  Cardiac Output Parameters PRN Until 24 Hours Post-Op  As Needed      07/10/18 1036    Unscheduled  Pacemaker Settings - Initiate for Heart Rate Less Than 60 And / Or Hemodynamically Unstable  As Needed     Comments:  Mode: AAI  Atrial rate: 90  Atrial mA: 10  Atrial mV: 0.5    07/10/18 1036    Unscheduled  Dangle at Bedside After Extubation  As Needed      07/10/18 1036    Unscheduled  Up in Chair As Tolerated After Extubation  As Needed      07/10/18 1036    Unscheduled  Insert Nasogastric Tube If Indicated & Not Already in Place  As Needed     Comments:  Indications: Nausea, Vomiting, Prolonged Intubation or to Administer Medications  Attach to Low Wall Suction if Any Residual    07/10/18 1036    Unscheduled  Cleanse Incision With Normal Saline and Redress With Dry 4x4 Gauze if Incision is Draining  As Needed      07/10/18 1036    Unscheduled  Change Chest Tube Dressings PRN to Keep Dry - Do NOT Reinforce  As Needed      07/10/18 1036    Unscheduled  Oxygen Therapy- Nasal Cannula; 2 LPM; Titrate for SPO2: 92%  Continuous PRN      07/10/18 1036    Unscheduled  Patient May Use Home CPAP / BIPAP As Needed  As Needed      07/10/18 1036    Unscheduled  Blood Gas, Arterial  As Needed     Comments:  30 Minutes After Ventilator Changes, 30 Minutes After Extubation and PRN      07/10/18 1036    Unscheduled  CBC & Differential  As Needed     Comments:  Chest Tube Drainage Greater Than 200mL/hr      07/10/18 1036     Unscheduled  aPTT  As Needed     Comments:  Chest Tube Drainage Greater Than 200mL/hr      07/10/18 1036    Unscheduled  Protime-INR  As Needed     Comments:  Chest Tube Drainage Greater Than 200mL/hr      07/10/18 1036    Unscheduled  Fibrinogen  As Needed     Comments:  Chest Tube Drainage Greater Than 200mL/hr      07/10/18 1036    Unscheduled  Potassium  As Needed     Comments:  Four hours after dose given.      07/10/18 1036    Unscheduled  Magnesium  As Needed     Comments:  If potassium stays low after replacement      07/10/18 1036    Signed and Held  famotidine (PEPCID) injection 20 mg  On Call to O.R.      Signed and Held    Signed and Held  LORazepam (ATIVAN) tablet 1 mg  On Call to O.R.      Signed and Held    Signed and Held  Assess Need for Indwelling Urinary Catheter - Follow Removal Protocol  Continuous         Signed and Held    Signed and Held  Catheter Care  Every Shift      Signed and Held             Operative/Procedure Notes (last 24 hours) (Notes from 7/9/2018 11:51 AM through 7/10/2018 11:51 AM)      Hudson Vidales MD at 7/10/2018  7:41 AM                                           [facility] CARDIAC SURGERY OP NOTE    Preop Diagnosis: Severe aortic stenosis from congenital bicuspid aortic valve disease.  Mild aortic incompetence.  Grade 6 ventricle with ventricular dilatation.  Single-vessel coronary disease.  No LAD disease.    Postop Diagnosis: Same    Indications: This patient had minimal symptoms from his aortic stenosis but his ventricle is starting to dilate on his last echo.  Operation was advisable to protect the ventricle, prolong life, and relieve symptoms.  He was found to have a 90% large diagonal lesion.  The STS Risk was discussed with the patient. I have discussed the Heater Cooler Infection situation with the patient and family. They understand and wish to proceed.    Procedure: Aortic valve replacement with a 29 mm Medtronic Mosaic ultra porcine valve.  CABG ×1 with vein  graft to diagonal branch the LAD.  Temporary cardiopulmonary bypass.  Antegrade and retrograde with direct coronary cardioplegia.  Neurologic monitoring.  Transesophageal echo.  Endoscopic vein harvest of the left greater saphenous vein.    Surgeon: Hudson Vidales MD    Assisistant: Lindsay Ibarra CSA    Anesthesia: GET    Findings : There is a typical bicuspid aortic valve with heavy calcification.  In this instance the left cornea sinus was diminutive.  The coronary arteries were 180° apart.  The diagonal branch was 2.5 mm.  The ventricle was a large grade 6 and hypertrophied grade 5.  The postoperative echo was satisfactory with a well-seated aortic valve and no paravalvular leak.  The mitral valve satisfactory.  LV function was 60%.    Operative Procedure: A primary median sternotomy was made while the left greater saphenous vein was harvested with the endoscope.  Cardio pulmonary bypass was then established for 78 minutes drifting a 34°C at appropriate flow rates.  The aorta was cross clamped for 57 minutes and we gave a liter of antegrade cold blood cardioplegia then 1.5 L of retrograde cold blood cardioplegia to good effect.  Once the aorta was opened and the valve was excised we gave direct coronary cardioplegia.  A vent was placed in a right superior pulmonary vein and we gave CO2 gas and the pericardial space.  Aortic valve leaflets were resected and the annulus debrided.  We rinsed with saline to remove any debris.  A 29 mm porcine valve was washed and rinsed the usual fashion and sewn into place with running 2-0 Prolene from commissure to commissure.  It was lowered into place seated nicely in the annulus and the sutures were fixed with cor knot device at each post.  The aorta was closed with 2 layers of running 5-0 Prolene with pledgets.  A single vein was anastomosed the ascending aorta with 6-0 Prolene and marked with a washer and then sewn to the diagonal branch with 7-0 Prolene.  A warm dose of  retrograde cardioplegia was given and there was strong suction on the aortic needle vent the cross-clamp was released.  Complete de-airing was performed and he regained sinus rhythm after a single defibrillation.  Echo was performed with good results and cardiopulmonary bypass was weaned and discontinued.  Decannulation was affected usual devices were placed and hemostasis was obtained.  2 chest tubes were inserted and we applied vancomycin enriched platelet rich plasma.  The sternum was closed with stainless steel wire.  The skin and soft tissues were closed as usual.  Postoperative echo was performed recorded digitally with good results and he went the open-heart recovery room in good condition.    Complications: None    Tubes: 2    Epicardial Wires: 3    Blood Loss: Minimal    Aortic X time: 57 min    CPB time: 78 min     Specimen: Aortic valve leaflets and calcium.    Condition: Good to open heart recovery      Patient Care Team:  Milvia Briceno MD as PCP - General        Hudson Vidales MD  7/10/2018  10:05 AM              Electronically signed by Hudson Vidales MD at 7/10/2018 10:11 AM

## 2018-07-10 NOTE — OP NOTE
[facility] CARDIAC SURGERY OP NOTE    Preop Diagnosis: Severe aortic stenosis from congenital bicuspid aortic valve disease.  Mild aortic incompetence.  Grade 6 ventricle with ventricular dilatation.  Single-vessel coronary disease.  No LAD disease.    Postop Diagnosis: Same    Indications: This patient had minimal symptoms from his aortic stenosis but his ventricle is starting to dilate on his last echo.  Operation was advisable to protect the ventricle, prolong life, and relieve symptoms.  He was found to have a 90% large diagonal lesion.  The STS Risk was discussed with the patient. I have discussed the Heater Cooler Infection situation with the patient and family. They understand and wish to proceed.    Procedure: Aortic valve replacement with a 29 mm Medtronic Mosaic ultra porcine valve.  CABG ×1 with vein graft to diagonal branch the LAD.  Temporary cardiopulmonary bypass.  Antegrade and retrograde with direct coronary cardioplegia.  Neurologic monitoring.  Transesophageal echo.  Endoscopic vein harvest of the left greater saphenous vein.    Surgeon: Hudson Vidales MD    Assisistant: Lindsay Ibarra CSA    Anesthesia: GET    Findings : There is a typical bicuspid aortic valve with heavy calcification.  In this instance the left cornea sinus was diminutive.  The coronary arteries were 180° apart.  The diagonal branch was 2.5 mm.  The ventricle was a large grade 6 and hypertrophied grade 5.  The postoperative echo was satisfactory with a well-seated aortic valve and no paravalvular leak.  The mitral valve satisfactory.  LV function was 60%.    Operative Procedure: A primary median sternotomy was made while the left greater saphenous vein was harvested with the endoscope.  Cardio pulmonary bypass was then established for 78 minutes drifting a 34°C at appropriate flow rates.  The aorta was cross clamped for 57 minutes and we gave a liter of antegrade cold blood cardioplegia then  1.5 L of retrograde cold blood cardioplegia to good effect.  Once the aorta was opened and the valve was excised we gave direct coronary cardioplegia.  A vent was placed in a right superior pulmonary vein and we gave CO2 gas and the pericardial space.  Aortic valve leaflets were resected and the annulus debrided.  We rinsed with saline to remove any debris.  A 29 mm porcine valve was washed and rinsed the usual fashion and sewn into place with running 2-0 Prolene from commissure to commissure.  It was lowered into place seated nicely in the annulus and the sutures were fixed with cor knot device at each post.  The aorta was closed with 2 layers of running 5-0 Prolene with pledgets.  A single vein was anastomosed the ascending aorta with 6-0 Prolene and marked with a washer and then sewn to the diagonal branch with 7-0 Prolene.  A warm dose of retrograde cardioplegia was given and there was strong suction on the aortic needle vent the cross-clamp was released.  Complete de-airing was performed and he regained sinus rhythm after a single defibrillation.  Echo was performed with good results and cardiopulmonary bypass was weaned and discontinued.  Decannulation was affected usual devices were placed and hemostasis was obtained.  2 chest tubes were inserted and we applied vancomycin enriched platelet rich plasma.  The sternum was closed with stainless steel wire.  The skin and soft tissues were closed as usual.  Postoperative echo was performed recorded digitally with good results and he went the open-heart recovery room in good condition.    Complications: None    Tubes: 2    Epicardial Wires: 3    Blood Loss: Minimal    Aortic X time: 57 min    CPB time: 78 min     Specimen: Aortic valve leaflets and calcium.    Condition: Good to open heart recovery      Patient Care Team:  Milvia Briceno MD as PCP - General        Hudson Vidales MD  7/10/2018  10:05 AM

## 2018-07-10 NOTE — ANESTHESIA POSTPROCEDURE EVALUATION
Patient: Sachin Tolliver    Procedure Summary     Date:  07/10/18 Room / Location:  [facility] OR [address] [place] [address] MAIN OR    Anesthesia Start:  0635 Anesthesia Stop:  1034    Procedure:  INTROP MARIAA; AORTIC VALVE REPLACEMENT; CORONARY ARTERY BYPASS X1 UTILIZING THE ENDOSCOPICALLY HARVESTED LEFT GREATER SAPHENOUS VEIN; PRP (N/A Chest) Diagnosis:       Aortic stenosis due to bicuspid aortic valve      (Aortic stenosis due to bicuspid aortic valve [Q23.0, Q23.1])    Surgeon:  Hudson Vidales MD Provider:  Dustin Lim MD    Anesthesia Type:  general ASA Status:  3          Anesthesia Type: general  Last vitals  BP   98/60 (07/10/18 1300)   Temp   36 °C (96.8 °F) (07/10/18 1025)   Pulse   80 (07/10/18 1300)   Resp   12 (07/10/18 1300)     SpO2   97 % (07/10/18 1300)     Post Anesthesia Care and Evaluation    Patient location during evaluation: PACU  Patient participation: complete - patient cannot participate  Level of consciousness: obtunded/minimal responses  Pain management: adequate  Airway patency: patent  Anesthetic complications: No anesthetic complications  PONV Status: NA  Cardiovascular status: acceptable  Respiratory status: acceptable, ETT, intubated and ventilator  Hydration status: acceptable

## 2018-07-10 NOTE — PLAN OF CARE
Problem: Patient Care Overview  Goal: Plan of Care Review  Outcome: Ongoing (interventions implemented as appropriate)   07/10/18 0512   Coping/Psychosocial   Plan of Care Reviewed With patient;spouse   Plan of Care Review   Progress no change   OTHER   Outcome Summary VSS. Cath site clean dry intact and soft. Patient anticipates heart sx in am. Patient doesnt have any complaints at this time. Will continue to monitor.

## 2018-07-10 NOTE — ANESTHESIA PROCEDURE NOTES
Arterial Line    Patient location during procedure: OR  Start time: 7/10/2018 6:47 AM  Stop Time:7/10/2018 6:49 AM       Line placed for hemodynamic monitoring.  Performed By   Anesthesiologist: YUDELKA WILD  Preanesthetic Checklist  Completed: patient identified, site marked, surgical consent, pre-op evaluation, timeout performed, IV checked, risks and benefits discussed and monitors and equipment checked  Arterial Line Prep   Sterile Tech: cap, gloves and mask  Prep: ChloraPrep  Patient monitoring: blood pressure monitoring, continuous pulse oximetry and EKG  Arterial Line Procedure   Laterality:left  Location:  radial artery  Catheter size: 20 G   Guidance: palpation technique  Number of attempts: 1  Successful placement: yes          Post Assessment   Dressing Type: biopatch applied, occlusive dressing applied, secured with tape and wrist guard applied.   Complications no  Circ/Move/Sens Assessment: normal and unchanged.   Patient Tolerance: patient tolerated the procedure well with no apparent complications

## 2018-07-10 NOTE — ANESTHESIA PREPROCEDURE EVALUATION
Anesthesia Evaluation     NPO Solid Status: > 8 hours             Airway   Mallampati: III  TM distance: >3 FB  Neck ROM: full  Small opening and Possible difficult intubation  Dental          Pulmonary - normal exam   Cardiovascular - normal exam    (+) hypertension, valvular problems/murmurs AS, CAD, hyperlipidemia,       Neuro/Psych  (+) headaches, psychiatric history,     GI/Hepatic/Renal/Endo    (+) obesity,       Musculoskeletal     Abdominal    Substance History      OB/GYN          Other                        Anesthesia Plan    ASA 3     general   (Discussed placement of arterial line, central line, MARIAA, endotracheal intubation, postop mechanical ventilation)  Anesthetic plan and risks discussed with patient.

## 2018-07-10 NOTE — ANESTHESIA PROCEDURE NOTES
Procedure Performed: Emergent/Open-Heart Anesthesia MARIAA     Start Time:        End Time:        General Procedure Information  MARIAA Placed for monitoring purposes only -- This is not a diagnostic MARIAA

## 2018-07-10 NOTE — ANESTHESIA PROCEDURE NOTES
Airway  Urgency: elective    Airway not difficult    General Information and Staff    Patient location during procedure: OR  Anesthesiologist: YUDELKA WILD    Indications and Patient Condition  Indications for airway management: airway protection    Preoxygenated: yes  MILS maintained throughout  Mask difficulty assessment: 1 - vent by mask    Final Airway Details  Final airway type: endotracheal airway      Successful airway: ETT  Cuffed: yes   Successful intubation technique: video laryngoscopy  Facilitating devices/methods: intubating stylet and cricoid pressure  Endotracheal tube insertion site: oral  Blade: Mara  Blade size: #3  ETT size: 8.5 mm  Cormack-Lehane Classification: grade I - full view of glottis  Placement verified by: chest auscultation and capnometry   Measured from: lips  ETT to lips (cm): 23  Number of attempts at approach: 1    Additional Comments  CMAC used from start (no DL done)

## 2018-07-11 ENCOUNTER — APPOINTMENT (OUTPATIENT)
Dept: GENERAL RADIOLOGY | Facility: HOSPITAL | Age: 63
End: 2018-07-11

## 2018-07-11 LAB
ACT BLD: 114 SECONDS (ref 82–152)
ALBUMIN SERPL-MCNC: 4.9 G/DL (ref 3.5–5.2)
ANION GAP SERPL CALCULATED.3IONS-SCNC: 12.1 MMOL/L
BASE EXCESS BLDA CALC-SCNC: 2 MMOL/L (ref -5–5)
BASE EXCESS BLDA CALC-SCNC: 4 MMOL/L (ref -5–5)
BASE EXCESS BLDA CALC-SCNC: 6 MMOL/L (ref -5–5)
BASE EXCESS BLDA CALC-SCNC: 6 MMOL/L (ref -5–5)
BASOPHILS # BLD AUTO: 0.01 10*3/MM3 (ref 0–0.2)
BASOPHILS NFR BLD AUTO: 0.1 % (ref 0–1.5)
BUN BLD-MCNC: 12 MG/DL (ref 8–23)
BUN/CREAT SERPL: 12.2 (ref 7–25)
CALCIUM SPEC-SCNC: 8.2 MG/DL (ref 8.6–10.5)
CHLORIDE SERPL-SCNC: 103 MMOL/L (ref 98–107)
CO2 BLDA-SCNC: 28 MMOL/L (ref 24–29)
CO2 BLDA-SCNC: 31 MMOL/L (ref 24–29)
CO2 BLDA-SCNC: 33 MMOL/L (ref 24–29)
CO2 BLDA-SCNC: 33 MMOL/L (ref 24–29)
CO2 SERPL-SCNC: 24.9 MMOL/L (ref 22–29)
CREAT BLD-MCNC: 0.98 MG/DL (ref 0.76–1.27)
CYTO UR: NORMAL
DEPRECATED RDW RBC AUTO: 45.3 FL (ref 37–54)
EOSINOPHIL # BLD AUTO: 0 10*3/MM3 (ref 0–0.7)
EOSINOPHIL NFR BLD AUTO: 0 % (ref 0.3–6.2)
ERYTHROCYTE [DISTWIDTH] IN BLOOD BY AUTOMATED COUNT: 13.7 % (ref 11.5–14.5)
GFR SERPL CREATININE-BSD FRML MDRD: 78 ML/MIN/1.73
GLUCOSE BLD-MCNC: 126 MG/DL (ref 65–99)
GLUCOSE BLDC GLUCOMTR-MCNC: 103 MG/DL (ref 70–130)
GLUCOSE BLDC GLUCOMTR-MCNC: 114 MG/DL (ref 70–130)
GLUCOSE BLDC GLUCOMTR-MCNC: 116 MG/DL (ref 70–130)
GLUCOSE BLDC GLUCOMTR-MCNC: 123 MG/DL (ref 70–130)
GLUCOSE BLDC GLUCOMTR-MCNC: 123 MG/DL (ref 70–130)
GLUCOSE BLDC GLUCOMTR-MCNC: 126 MG/DL (ref 70–130)
GLUCOSE BLDC GLUCOMTR-MCNC: 126 MG/DL (ref 70–130)
GLUCOSE BLDC GLUCOMTR-MCNC: 131 MG/DL (ref 70–130)
GLUCOSE BLDC GLUCOMTR-MCNC: 133 MG/DL (ref 70–130)
GLUCOSE BLDC GLUCOMTR-MCNC: 134 MG/DL (ref 70–130)
GLUCOSE BLDC GLUCOMTR-MCNC: 134 MG/DL (ref 70–130)
GLUCOSE BLDC GLUCOMTR-MCNC: 141 MG/DL (ref 70–130)
HCO3 BLDA-SCNC: 26.9 MMOL/L (ref 22–26)
HCO3 BLDA-SCNC: 29.4 MMOL/L (ref 22–26)
HCO3 BLDA-SCNC: 31.4 MMOL/L (ref 22–26)
HCO3 BLDA-SCNC: 31.5 MMOL/L (ref 22–26)
HCT VFR BLD AUTO: 32.5 % (ref 40.4–52.2)
HCT VFR BLDA CALC: 30 % (ref 38–51)
HCT VFR BLDA CALC: 31 % (ref 38–51)
HCT VFR BLDA CALC: 31 % (ref 38–51)
HCT VFR BLDA CALC: 40 % (ref 38–51)
HGB BLD-MCNC: 10.8 G/DL (ref 13.7–17.6)
HGB BLDA-MCNC: 10.2 G/DL (ref 12–17)
HGB BLDA-MCNC: 10.5 G/DL (ref 12–17)
HGB BLDA-MCNC: 10.5 G/DL (ref 12–17)
HGB BLDA-MCNC: 13.6 G/DL (ref 12–17)
IMM GRANULOCYTES # BLD: 0.02 10*3/MM3 (ref 0–0.03)
IMM GRANULOCYTES NFR BLD: 0.2 % (ref 0–0.5)
INR PPP: 1.28 (ref 0.9–1.1)
LAB AP CASE REPORT: NORMAL
LYMPHOCYTES # BLD AUTO: 0.5 10*3/MM3 (ref 0.9–4.8)
LYMPHOCYTES NFR BLD AUTO: 4.9 % (ref 19.6–45.3)
MAGNESIUM SERPL-MCNC: 2.5 MG/DL (ref 1.6–2.4)
MCH RBC QN AUTO: 30.6 PG (ref 27–32.7)
MCHC RBC AUTO-ENTMCNC: 33.2 G/DL (ref 32.6–36.4)
MCV RBC AUTO: 92.1 FL (ref 79.8–96.2)
MONOCYTES # BLD AUTO: 0.97 10*3/MM3 (ref 0.2–1.2)
MONOCYTES NFR BLD AUTO: 9.5 % (ref 5–12)
NEUTROPHILS # BLD AUTO: 8.66 10*3/MM3 (ref 1.9–8.1)
NEUTROPHILS NFR BLD AUTO: 85.3 % (ref 42.7–76)
PATH REPORT.FINAL DX SPEC: NORMAL
PATH REPORT.GROSS SPEC: NORMAL
PCO2 BLDA: 43.3 MM HG (ref 35–45)
PCO2 BLDA: 52.3 MM HG (ref 35–45)
PCO2 BLDA: 56.1 MM HG (ref 35–45)
PCO2 BLDA: 57.8 MM HG (ref 35–45)
PH BLDA: 7.34 PH UNITS (ref 7.35–7.6)
PH BLDA: 7.36 PH UNITS (ref 7.35–7.6)
PH BLDA: 7.36 PH UNITS (ref 7.35–7.6)
PH BLDA: 7.4 PH UNITS (ref 7.35–7.6)
PHOSPHATE SERPL-MCNC: 3.1 MG/DL (ref 2.5–4.5)
PLATELET # BLD AUTO: 120 10*3/MM3 (ref 140–500)
PMV BLD AUTO: 11.1 FL (ref 6–12)
PO2 BLDA: 387 MMHG (ref 80–105)
PO2 BLDA: 396 MMHG (ref 80–105)
PO2 BLDA: 47 MMHG (ref 80–105)
PO2 BLDA: 95 MMHG (ref 80–105)
POTASSIUM BLD-SCNC: 4.1 MMOL/L (ref 3.5–5.2)
POTASSIUM BLDA-SCNC: 3.8 MMOL/L (ref 3.5–4.9)
POTASSIUM BLDA-SCNC: 3.9 MMOL/L (ref 3.5–4.9)
POTASSIUM BLDA-SCNC: 4 MMOL/L (ref 3.5–4.9)
POTASSIUM BLDA-SCNC: 4.1 MMOL/L (ref 3.5–4.9)
PROTHROMBIN TIME: 15.8 SECONDS (ref 11.7–14.2)
RBC # BLD AUTO: 3.53 10*6/MM3 (ref 4.6–6)
SAO2 % BLDA: 100 % (ref 95–98)
SAO2 % BLDA: 100 % (ref 95–98)
SAO2 % BLDA: 80 % (ref 95–98)
SAO2 % BLDA: 97 % (ref 95–98)
SODIUM BLD-SCNC: 140 MMOL/L (ref 136–145)
WBC NRBC COR # BLD: 10.16 10*3/MM3 (ref 4.5–10.7)

## 2018-07-11 PROCEDURE — 25010000003 CEFAZOLIN IN DEXTROSE 2-4 GM/100ML-% SOLUTION: Performed by: THORACIC SURGERY (CARDIOTHORACIC VASCULAR SURGERY)

## 2018-07-11 PROCEDURE — 99232 SBSQ HOSP IP/OBS MODERATE 35: CPT | Performed by: INTERNAL MEDICINE

## 2018-07-11 PROCEDURE — 93005 ELECTROCARDIOGRAM TRACING: CPT | Performed by: THORACIC SURGERY (CARDIOTHORACIC VASCULAR SURGERY)

## 2018-07-11 PROCEDURE — 71045 X-RAY EXAM CHEST 1 VIEW: CPT

## 2018-07-11 PROCEDURE — 93010 ELECTROCARDIOGRAM REPORT: CPT | Performed by: INTERNAL MEDICINE

## 2018-07-11 PROCEDURE — 25010000002 AMIODARONE IN DEXTROSE 5% 360-4.14 MG/200ML-% SOLUTION: Performed by: THORACIC SURGERY (CARDIOTHORACIC VASCULAR SURGERY)

## 2018-07-11 PROCEDURE — 82962 GLUCOSE BLOOD TEST: CPT

## 2018-07-11 PROCEDURE — 97162 PT EVAL MOD COMPLEX 30 MIN: CPT

## 2018-07-11 PROCEDURE — 25010000002 FUROSEMIDE PER 20 MG: Performed by: NURSE PRACTITIONER

## 2018-07-11 PROCEDURE — 97110 THERAPEUTIC EXERCISES: CPT

## 2018-07-11 PROCEDURE — 25010000002 METOCLOPRAMIDE PER 10 MG: Performed by: THORACIC SURGERY (CARDIOTHORACIC VASCULAR SURGERY)

## 2018-07-11 PROCEDURE — 85610 PROTHROMBIN TIME: CPT | Performed by: THORACIC SURGERY (CARDIOTHORACIC VASCULAR SURGERY)

## 2018-07-11 PROCEDURE — 85025 COMPLETE CBC W/AUTO DIFF WBC: CPT | Performed by: THORACIC SURGERY (CARDIOTHORACIC VASCULAR SURGERY)

## 2018-07-11 PROCEDURE — 80069 RENAL FUNCTION PANEL: CPT | Performed by: THORACIC SURGERY (CARDIOTHORACIC VASCULAR SURGERY)

## 2018-07-11 PROCEDURE — 25010000002 MAGNESIUM SULFATE IN D5W 1G/100ML (PREMIX) 1-5 GM/100ML-% SOLUTION: Performed by: THORACIC SURGERY (CARDIOTHORACIC VASCULAR SURGERY)

## 2018-07-11 PROCEDURE — 83735 ASSAY OF MAGNESIUM: CPT | Performed by: THORACIC SURGERY (CARDIOTHORACIC VASCULAR SURGERY)

## 2018-07-11 RX ORDER — NICOTINE POLACRILEX 4 MG
15 LOZENGE BUCCAL
Status: DISCONTINUED | OUTPATIENT
Start: 2018-07-11 | End: 2018-07-14 | Stop reason: HOSPADM

## 2018-07-11 RX ORDER — AMIODARONE HYDROCHLORIDE 200 MG/1
400 TABLET ORAL EVERY 12 HOURS SCHEDULED
Status: DISCONTINUED | OUTPATIENT
Start: 2018-07-11 | End: 2018-07-11

## 2018-07-11 RX ORDER — ESCITALOPRAM OXALATE 10 MG/1
10 TABLET ORAL DAILY
Status: DISCONTINUED | OUTPATIENT
Start: 2018-07-11 | End: 2018-07-14 | Stop reason: HOSPADM

## 2018-07-11 RX ORDER — FUROSEMIDE 10 MG/ML
40 INJECTION INTRAMUSCULAR; INTRAVENOUS ONCE
Status: COMPLETED | OUTPATIENT
Start: 2018-07-11 | End: 2018-07-11

## 2018-07-11 RX ORDER — AMIODARONE HYDROCHLORIDE 200 MG/1
400 TABLET ORAL EVERY 12 HOURS SCHEDULED
Status: DISCONTINUED | OUTPATIENT
Start: 2018-07-11 | End: 2018-07-14 | Stop reason: HOSPADM

## 2018-07-11 RX ORDER — DEXTROSE MONOHYDRATE 25 G/50ML
25 INJECTION, SOLUTION INTRAVENOUS
Status: DISCONTINUED | OUTPATIENT
Start: 2018-07-11 | End: 2018-07-14 | Stop reason: HOSPADM

## 2018-07-11 RX ORDER — METOPROLOL TARTRATE 50 MG/1
50 TABLET, FILM COATED ORAL EVERY 12 HOURS SCHEDULED
Status: DISCONTINUED | OUTPATIENT
Start: 2018-07-11 | End: 2018-07-14 | Stop reason: HOSPADM

## 2018-07-11 RX ORDER — POTASSIUM CHLORIDE 750 MG/1
20 CAPSULE, EXTENDED RELEASE ORAL DAILY
Status: DISCONTINUED | OUTPATIENT
Start: 2018-07-11 | End: 2018-07-14 | Stop reason: HOSPADM

## 2018-07-11 RX ADMIN — CEFAZOLIN SODIUM 2 G: 2 INJECTION, SOLUTION INTRAVENOUS at 20:26

## 2018-07-11 RX ADMIN — METOPROLOL TARTRATE 50 MG: 25 TABLET ORAL at 20:27

## 2018-07-11 RX ADMIN — METOCLOPRAMIDE 10 MG: 5 INJECTION, SOLUTION INTRAMUSCULAR; INTRAVENOUS at 06:32

## 2018-07-11 RX ADMIN — CYCLOBENZAPRINE 10 MG: 10 TABLET, FILM COATED ORAL at 14:08

## 2018-07-11 RX ADMIN — METOPROLOL TARTRATE 25 MG: 25 TABLET ORAL at 09:09

## 2018-07-11 RX ADMIN — CEFAZOLIN SODIUM 2 G: 2 INJECTION, SOLUTION INTRAVENOUS at 11:18

## 2018-07-11 RX ADMIN — CYCLOBENZAPRINE 10 MG: 10 TABLET, FILM COATED ORAL at 22:58

## 2018-07-11 RX ADMIN — MAGNESIUM SULFATE HEPTAHYDRATE 1 G: 1 INJECTION, SOLUTION INTRAVENOUS at 04:37

## 2018-07-11 RX ADMIN — HYDROCODONE BITARTRATE AND ACETAMINOPHEN 2 TABLET: 5; 325 TABLET ORAL at 12:48

## 2018-07-11 RX ADMIN — CEFAZOLIN SODIUM 2 G: 2 INJECTION, SOLUTION INTRAVENOUS at 02:52

## 2018-07-11 RX ADMIN — POTASSIUM CHLORIDE 20 MEQ: 750 CAPSULE, EXTENDED RELEASE ORAL at 18:21

## 2018-07-11 RX ADMIN — OXYCODONE HYDROCHLORIDE 10 MG: 5 TABLET ORAL at 18:29

## 2018-07-11 RX ADMIN — MUPIROCIN 10 APPLICATION: 20 OINTMENT TOPICAL at 09:09

## 2018-07-11 RX ADMIN — AMIODARONE HYDROCHLORIDE 400 MG: 200 TABLET ORAL at 09:52

## 2018-07-11 RX ADMIN — OXYCODONE HYDROCHLORIDE 10 MG: 5 TABLET ORAL at 07:44

## 2018-07-11 RX ADMIN — ATORVASTATIN CALCIUM 40 MG: 20 TABLET, FILM COATED ORAL at 20:27

## 2018-07-11 RX ADMIN — MUPIROCIN 10 APPLICATION: 20 OINTMENT TOPICAL at 20:27

## 2018-07-11 RX ADMIN — FUROSEMIDE 40 MG: 10 INJECTION, SOLUTION INTRAMUSCULAR; INTRAVENOUS at 18:21

## 2018-07-11 RX ADMIN — DOCUSATE SODIUM -SENNOSIDES 2 TABLET: 50; 8.6 TABLET, COATED ORAL at 20:27

## 2018-07-11 RX ADMIN — CHLORHEXIDINE GLUCONATE 15 ML: 1.2 RINSE ORAL at 23:06

## 2018-07-11 RX ADMIN — ASPIRIN 81 MG: 81 TABLET, DELAYED RELEASE ORAL at 09:09

## 2018-07-11 RX ADMIN — PANTOPRAZOLE SODIUM 40 MG: 40 TABLET, DELAYED RELEASE ORAL at 09:09

## 2018-07-11 RX ADMIN — AMIODARONE HYDROCHLORIDE 0.5 MG/MIN: 1.8 INJECTION, SOLUTION INTRAVENOUS at 02:30

## 2018-07-11 RX ADMIN — AMIODARONE HYDROCHLORIDE 400 MG: 200 TABLET ORAL at 20:27

## 2018-07-11 NOTE — CONSULTS
"Met with patient and wife, discussed benefits of cardiac rehab. Provided phase II information packet, which includes; general information about cardiac rehab, hospitals Cardiac Rehab Programs handout and [facility] letter article entitled “Cardiac Rehab is often the Best Medicine for Recovery\", stresses the importance of cardiac rehab after a heart event.   I provided the contact information for cardiac rehab here at [facility] and encouraged them to call when discharged. Wife states his plans are to return to work in 6 weeks.Explaineed our program opens at 0700 to accommodate those patients who work.   Explained if receiving home health would not be able to attend cardiac rehab until finished with home health. Verbalized understanding  and expressed interest in attending the program.      "

## 2018-07-11 NOTE — PLAN OF CARE
Problem: Patient Care Overview  Goal: Plan of Care Review  Outcome: Ongoing (interventions implemented as appropriate)   07/11/18 1258   Coping/Psychosocial   Plan of Care Reviewed With patient   Plan of Care Review   Progress improving   OTHER   Outcome Summary Patient transferred to CVU today. Progressing well, but having issues with pain control. VSS. Will continue to monitor.       Problem: Skin Injury Risk (Adult)  Goal: Identify Related Risk Factors and Signs and Symptoms  Outcome: Outcome(s) achieved Date Met: 07/11/18    Goal: Skin Health and Integrity  Outcome: Ongoing (interventions implemented as appropriate)      Problem: Cardiac Surgery (Adult)  Goal: Signs and Symptoms of Listed Potential Problems Will be Absent, Minimized or Managed (Cardiac Surgery)  Outcome: Ongoing (interventions implemented as appropriate)

## 2018-07-11 NOTE — PROGRESS NOTES
LOS: 2 days   Patient Care Team:  Milvia Briceno MD as PCP - General    Chief Complaint: post op  Subjective      Vital Signs  Temp:  [96.8 °F (36 °C)-98 °F (36.7 °C)] 97.2 °F (36.2 °C)  Heart Rate:  [60-80] 73  Resp:  [12-18] 16  BP: ()/(47-86) 126/75  Arterial Line BP: ()/(57-89) 138/67  FiO2 (%):  [40 %-100 %] 40 %  Body mass index is 32.6 kg/m².    Intake/Output Summary (Last 24 hours) at 07/11/18 0857  Last data filed at 07/11/18 0800   Gross per 24 hour   Intake          3440.11 ml   Output             5225 ml   Net         -1784.89 ml     I/O this shift:  In: -   Out: 95 [Urine:75; Chest Tube:20]    Chest tube drainage last 8 hours 120    1    07/09/18  0730 07/10/18  0500   Weight: 115 kg (254 lb 9 oz) 115 kg (253 lb 14.4 oz)         Objective    Results Review:        WBC WBC   Date Value Ref Range Status   07/11/2018 10.16 4.50 - 10.70 10*3/mm3 Final   07/10/2018 10.92 (H) 4.50 - 10.70 10*3/mm3 Final   07/10/2018 12.02 (H) 4.50 - 10.70 10*3/mm3 Final   07/10/2018 7.86 4.50 - 10.70 10*3/mm3 Final   07/09/2018 5.85 4.50 - 10.70 10*3/mm3 Final      HGB Hemoglobin   Date Value Ref Range Status   07/11/2018 10.8 (L) 13.7 - 17.6 g/dL Final   07/10/2018 11.0 (L) 13.7 - 17.6 g/dL Final   07/10/2018 11.9 (L) 13.7 - 17.6 g/dL Final   07/10/2018 10.2 (L) 12.0 - 17.0 g/dL Final   07/10/2018 13.8 13.7 - 17.6 g/dL Final   07/09/2018 13.0 (L) 13.7 - 17.6 g/dL Final   07/09/2018 12.9 12.0 - 17.0 g/dL Final   07/09/2018 12.6 12.0 - 17.0 g/dL Final      HCT Hematocrit   Date Value Ref Range Status   07/11/2018 32.5 (L) 40.4 - 52.2 % Final   07/10/2018 32.6 (L) 40.4 - 52.2 % Final   07/10/2018 34.4 (L) 40.4 - 52.2 % Final   07/10/2018 30 (L) 38 - 51 % Final   07/10/2018 42.6 40.4 - 52.2 % Final   07/09/2018 39.5 (L) 40.4 - 52.2 % Final   07/09/2018 38 38 - 51 % Final   07/09/2018 37 (L) 38 - 51 % Final      Platelets Platelets   Date Value Ref Range Status   07/11/2018 120 (L) 140 - 500 10*3/mm3 Final    07/10/2018 123 (L) 140 - 500 10*3/mm3 Final   07/10/2018 170 140 - 500 10*3/mm3 Final   07/10/2018 210 140 - 500 10*3/mm3 Final   07/09/2018 219 140 - 500 10*3/mm3 Final        PT/INR:    Protime   Date Value Ref Range Status   07/11/2018 15.8 (H) 11.7 - 14.2 Seconds Final   07/10/2018 16.4 (H) 11.7 - 14.2 Seconds Final   07/09/2018 13.4 11.7 - 14.2 Seconds Final   /  INR   Date Value Ref Range Status   07/11/2018 1.28 (H) 0.90 - 1.10 Final   07/10/2018 1.35 (H) 0.90 - 1.10 Final   07/09/2018 1.04 0.90 - 1.10 Final       Sodium Sodium   Date Value Ref Range Status   07/11/2018 140 136 - 145 mmol/L Final   07/10/2018 141 136 - 145 mmol/L Final   07/10/2018 139 136 - 145 mmol/L Final   07/10/2018 138 136 - 145 mmol/L Final   07/09/2018 139 136 - 145 mmol/L Final      Potassium Potassium   Date Value Ref Range Status   07/11/2018 4.1 3.5 - 5.2 mmol/L Final   07/10/2018 4.5 3.5 - 5.2 mmol/L Final   07/10/2018 3.8 3.5 - 5.2 mmol/L Final   07/10/2018 3.9 3.5 - 5.2 mmol/L Final   07/10/2018 3.8 3.5 - 5.2 mmol/L Final   07/09/2018 3.8 3.5 - 5.2 mmol/L Final      Chloride Chloride   Date Value Ref Range Status   07/11/2018 103 98 - 107 mmol/L Final   07/10/2018 103 98 - 107 mmol/L Final   07/10/2018 102 98 - 107 mmol/L Final   07/10/2018 101 98 - 107 mmol/L Final   07/09/2018 104 98 - 107 mmol/L Final      Bicarbonate CO2   Date Value Ref Range Status   07/11/2018 24.9 22.0 - 29.0 mmol/L Final   07/10/2018 23.5 22.0 - 29.0 mmol/L Final   07/10/2018 21.9 (L) 22.0 - 29.0 mmol/L Final   07/10/2018 23.4 22.0 - 29.0 mmol/L Final   07/09/2018 23.8 22.0 - 29.0 mmol/L Final      BUN BUN   Date Value Ref Range Status   07/11/2018 12 8 - 23 mg/dL Final   07/10/2018 12 8 - 23 mg/dL Final   07/10/2018 13 8 - 23 mg/dL Final   07/10/2018 15 8 - 23 mg/dL Final   07/09/2018 16 8 - 23 mg/dL Final      Creatinine Creatinine   Date Value Ref Range Status   07/11/2018 0.98 0.76 - 1.27 mg/dL Final   07/10/2018 1.08 0.76 - 1.27 mg/dL Final    07/10/2018 1.21 0.76 - 1.27 mg/dL Final   07/10/2018 1.09 0.76 - 1.27 mg/dL Final   07/09/2018 1.11 0.76 - 1.27 mg/dL Final      Calcium Calcium   Date Value Ref Range Status   07/11/2018 8.2 (L) 8.6 - 10.5 mg/dL Final   07/10/2018 8.5 (L) 8.6 - 10.5 mg/dL Final   07/10/2018 8.8 8.6 - 10.5 mg/dL Final   07/10/2018 9.0 8.6 - 10.5 mg/dL Final   07/09/2018 9.1 8.6 - 10.5 mg/dL Final      Magnesium Magnesium   Date Value Ref Range Status   07/11/2018 2.5 (H) 1.6 - 2.4 mg/dL Final   07/10/2018 2.7 (H) 1.6 - 2.4 mg/dL Final   07/10/2018 2.5 (H) 1.6 - 2.4 mg/dL Final   07/10/2018 3.0 (H) 1.6 - 2.4 mg/dL Final   07/09/2018 2.3 1.6 - 2.4 mg/dL Final            aspirin 81 mg Oral Daily   atorvastatin 40 mg Oral Nightly   ceFAZolin 2 g Intravenous Q8H   chlorhexidine 15 mL Mouth/Throat Q12H   magnesium sulfate 1 g Intravenous Q8H   metoclopramide 10 mg Intravenous Q6H   metoprolol tartrate 25 mg Oral Q12H   mupirocin  Each Nare BID   pantoprazole 40 mg Oral Daily   sennosides-docusate sodium 2 tablet Oral Nightly       amiodarone 0.5 mg/min Last Rate: 0.5 mg/min (07/11/18 0230)   clevidipine 2-32 mg/hr    dexmedetomidine 0.2-1.5 mcg/kg/hr Last Rate: Stopped (07/11/18 0000)   DOPamine 2-20 mcg/kg/min    EPINEPHrine 0.02-0.3 mcg/kg/min    insulin regular infusion 1 unit/mL (CCU use) 0-50 Units/hr Last Rate: Stopped (07/11/18 0705)   milrinone 0.25-0.75 mcg/kg/min Last Rate: Stopped (07/10/18 1430)   niCARdipine 5-15 mg/hr    nitroglycerin 5-200 mcg/min    norepinephrine 0.02-0.3 mcg/kg/min Last Rate: Stopped (07/10/18 1215)   phenylephrine 0.2-3 mcg/kg/min Last Rate: Stopped (07/10/18 1154)   propofol 5-50 mcg/kg/min Last Rate: Stopped (07/10/18 1215)   sodium chloride 30 mL/hr Last Rate: 30 mL/hr (07/10/18 1119)   sodium chloride 30 mL/hr Last Rate: Stopped (07/11/18 0705)   vasopressin 0.02-0.1 Units/min            Patient Active Problem List   Diagnosis Code   • Benign essential hypertension I10   • Hemolytic anemia  (CMS/[facility]) D58.9   • Hyperlipidemia E78.5   • Migraine G43.909   • Situational depression F43.21   • Aortic valve calcification I35.9   • Aortic stenosis due to bicuspid aortic valve Q23.0, Q23.1   • Obesity, morbid (CMS/[facility]) E66.01   • Nonrheumatic aortic valve stenosis I35.0       Assessment & Plan    -Severe aortic stenosis- s/p AVR- tissue, CABGx1 vein graft to diagonal LAD--POD#1 Flash  -Single vessel CAD  -LV hypertrophy, preserved function EF 65%  -Hypertension   -Depression- on lexapro  -Migraines  -Hyperlipidemia- on statin  -post op anemia, expected ABL     2L NC  CXR small left effusion  SR rate 72    Discontinue central line. Switch amiodarone to PO.  Discontinue chest tube, place edmnud tube to bulb. Wean O2 as tolerated. Encourage IS. Transfer to stepdown.    Irais Alas, MITCHELL  07/11/18  8:57 AM

## 2018-07-11 NOTE — PLAN OF CARE
Problem: Patient Care Overview  Goal: Plan of Care Review   07/11/18 0840   Coping/Psychosocial   Plan of Care Reviewed With patient   OTHER   Outcome Summary Pt presents s/p CABG x1 leading to decreased independence and compromised safety w functional mobility. Due to the above pt will benifit from skilled PT to address mobility deficits and increase over all safety and independence w mobility.

## 2018-07-11 NOTE — PROGRESS NOTES
Hospital Follow Up      Chief Complaint: Follow up CAD, aortic stenosis status post CABG, AVR    Interval History: Complains of chest soreness.     Objective:     Objective:  Temp:  [96.8 °F (36 °C)-98 °F (36.7 °C)] 97.2 °F (36.2 °C)  Heart Rate:  [60-80] 67  Resp:  [12-18] 18  BP: ()/(47-86) 116/67  Arterial Line BP: ()/(57-89) 138/67  FiO2 (%):  [40 %-100 %] 40 %     Intake/Output Summary (Last 24 hours) at 07/11/18 0739  Last data filed at 07/11/18 0715   Gross per 24 hour   Intake          4340.11 ml   Output             5350 ml   Net         -1009.89 ml     Body mass index is 32.6 kg/m².  1    07/09/18  0730 07/10/18  0500   Weight: 115 kg (254 lb 9 oz) 115 kg (253 lb 14.4 oz)     Weight change:       Physical Exam:   General : Alert, cooperative, in no acute distress.  Neuro: alert,cooperative and oriented  Lungs: CTAB. Normal respiratory effort and rate.  CV:: Regular rate and rhythm, normal S1 and S2, no murmurs, gallops or rubs.  ABD: Soft, nontender, non-distended. positive bowel sounds  Extr: No edema or cyanosis, moves all extremities    Lab Review:     Results from last 7 days  Lab Units 07/11/18  0253 07/10/18  1843 07/10/18  1446  07/09/18  1433   SODIUM mmol/L 140  --  141  < > 139   POTASSIUM mmol/L 4.1 4.5 3.8  < > 3.8   CHLORIDE mmol/L 103  --  103  < > 104   CO2 mmol/L 24.9  --  23.5  < > 23.8   BUN mg/dL 12  --  12  < > 16   CREATININE mg/dL 0.98  --  1.08  < > 1.11   GLUCOSE mg/dL 126*  --  141*  < > 99   CALCIUM mg/dL 8.2*  --  8.5*  < > 9.1   AST (SGOT) U/L  --   --   --   --  16   ALT (SGPT) U/L  --   --   --   --  16   < > = values in this interval not displayed.        Results from last 7 days  Lab Units 07/11/18  0253 07/10/18  1446   WBC 10*3/mm3 10.16 10.92*   HEMOGLOBIN g/dL 10.8* 11.0*   HEMATOCRIT % 32.5* 32.6*   PLATELETS 10*3/mm3 120* 123*       Results from last 7 days  Lab Units 07/11/18  0253 07/10/18  1038 07/09/18  1433   INR  1.28* 1.35* 1.04   APTT seconds   --  32.5 26.6       Results from last 7 days  Lab Units 07/11/18  0253 07/10/18  1843   MAGNESIUM mg/dL 2.5* 2.7*       Results from last 7 days  Lab Units 07/09/18  1433   CHOLESTEROL mg/dL 174   TRIGLYCERIDES mg/dL 178*   HDL CHOL mg/dL 36*       Results from last 7 days  Lab Units 07/09/18  1433   PROBNP pg/mL 225.9         I reviewed the patient's new clinical results.  I personally viewed and interpreted the patient's EKG  Current Medications:   Scheduled Meds:  aspirin 81 mg Oral Daily   atorvastatin 40 mg Oral Nightly   ceFAZolin 2 g Intravenous Q8H   chlorhexidine 15 mL Mouth/Throat Q12H   magnesium sulfate 1 g Intravenous Q8H   metoclopramide 10 mg Intravenous Q6H   metoprolol tartrate 25 mg Oral Q12H   mupirocin  Each Nare BID   pantoprazole 40 mg Oral Daily   sennosides-docusate sodium 2 tablet Oral Nightly     Continuous Infusions:  amiodarone 0.5 mg/min Last Rate: 0.5 mg/min (07/11/18 0230)   clevidipine 2-32 mg/hr    dexmedetomidine 0.2-1.5 mcg/kg/hr Last Rate: Stopped (07/11/18 0000)   DOPamine 2-20 mcg/kg/min    EPINEPHrine 0.02-0.3 mcg/kg/min    insulin regular infusion 1 unit/mL (CCU use) 0-50 Units/hr Last Rate: 4 Units/hr (07/11/18 0504)   milrinone 0.25-0.75 mcg/kg/min Last Rate: Stopped (07/10/18 1430)   niCARdipine 5-15 mg/hr    nitroglycerin 5-200 mcg/min    norepinephrine 0.02-0.3 mcg/kg/min Last Rate: Stopped (07/10/18 1215)   phenylephrine 0.2-3 mcg/kg/min Last Rate: Stopped (07/10/18 1154)   propofol 5-50 mcg/kg/min Last Rate: Stopped (07/10/18 1215)   sodium chloride 30 mL/hr Last Rate: 30 mL/hr (07/10/18 1119)   sodium chloride 30 mL/hr Last Rate: 30 mL/hr (07/10/18 1700)   vasopressin 0.02-0.1 Units/min        Allergies:  No Known Allergies    Assessment/Plan:     1.  Bicuspid aortic valve with stenosis status post bioprosthetic aortic valve replacement. POD #1.   2.  Single vessel coronary artery disease.  Status post SVG to first diagonal branch.    3.  Post-op anemia  4.  Post-op  thrombocytopenia  5.  Hypertension  6.  Hyperlipidemia  7.  Depression    -  Transition amiodarone gtt to oral  -  Transfer to CVU when appropriate    Alexandria Ashraf MD  07/11/18  7:39 AM

## 2018-07-12 ENCOUNTER — APPOINTMENT (OUTPATIENT)
Dept: GENERAL RADIOLOGY | Facility: HOSPITAL | Age: 63
End: 2018-07-12

## 2018-07-12 LAB
ANION GAP SERPL CALCULATED.3IONS-SCNC: 12.4 MMOL/L
BUN BLD-MCNC: 12 MG/DL (ref 8–23)
BUN/CREAT SERPL: 11.9 (ref 7–25)
CALCIUM SPEC-SCNC: 8.6 MG/DL (ref 8.6–10.5)
CHLORIDE SERPL-SCNC: 94 MMOL/L (ref 98–107)
CO2 SERPL-SCNC: 25.6 MMOL/L (ref 22–29)
CREAT BLD-MCNC: 1.01 MG/DL (ref 0.76–1.27)
DEPRECATED RDW RBC AUTO: 48.5 FL (ref 37–54)
ERYTHROCYTE [DISTWIDTH] IN BLOOD BY AUTOMATED COUNT: 14.1 % (ref 11.5–14.5)
GFR SERPL CREATININE-BSD FRML MDRD: 75 ML/MIN/1.73
GLUCOSE BLD-MCNC: 109 MG/DL (ref 65–99)
GLUCOSE BLDC GLUCOMTR-MCNC: 110 MG/DL (ref 70–130)
GLUCOSE BLDC GLUCOMTR-MCNC: 112 MG/DL (ref 70–130)
GLUCOSE BLDC GLUCOMTR-MCNC: 117 MG/DL (ref 70–130)
GLUCOSE BLDC GLUCOMTR-MCNC: 122 MG/DL (ref 70–130)
HCT VFR BLD AUTO: 34.7 % (ref 40.4–52.2)
HGB BLD-MCNC: 11.3 G/DL (ref 13.7–17.6)
MCH RBC QN AUTO: 30.5 PG (ref 27–32.7)
MCHC RBC AUTO-ENTMCNC: 32.6 G/DL (ref 32.6–36.4)
MCV RBC AUTO: 93.5 FL (ref 79.8–96.2)
PLATELET # BLD AUTO: 129 10*3/MM3 (ref 140–500)
PMV BLD AUTO: 12 FL (ref 6–12)
POTASSIUM BLD-SCNC: 3.7 MMOL/L (ref 3.5–5.2)
RBC # BLD AUTO: 3.71 10*6/MM3 (ref 4.6–6)
SODIUM BLD-SCNC: 132 MMOL/L (ref 136–145)
WBC NRBC COR # BLD: 14.29 10*3/MM3 (ref 4.5–10.7)

## 2018-07-12 PROCEDURE — 71045 X-RAY EXAM CHEST 1 VIEW: CPT

## 2018-07-12 PROCEDURE — 85027 COMPLETE CBC AUTOMATED: CPT | Performed by: THORACIC SURGERY (CARDIOTHORACIC VASCULAR SURGERY)

## 2018-07-12 PROCEDURE — 82962 GLUCOSE BLOOD TEST: CPT

## 2018-07-12 PROCEDURE — 93010 ELECTROCARDIOGRAM REPORT: CPT | Performed by: INTERNAL MEDICINE

## 2018-07-12 PROCEDURE — 80048 BASIC METABOLIC PNL TOTAL CA: CPT | Performed by: THORACIC SURGERY (CARDIOTHORACIC VASCULAR SURGERY)

## 2018-07-12 PROCEDURE — 93005 ELECTROCARDIOGRAM TRACING: CPT | Performed by: THORACIC SURGERY (CARDIOTHORACIC VASCULAR SURGERY)

## 2018-07-12 PROCEDURE — 99232 SBSQ HOSP IP/OBS MODERATE 35: CPT | Performed by: INTERNAL MEDICINE

## 2018-07-12 PROCEDURE — 97110 THERAPEUTIC EXERCISES: CPT

## 2018-07-12 RX ADMIN — AMIODARONE HYDROCHLORIDE 400 MG: 200 TABLET ORAL at 08:52

## 2018-07-12 RX ADMIN — METOPROLOL TARTRATE 50 MG: 25 TABLET ORAL at 08:52

## 2018-07-12 RX ADMIN — POTASSIUM CHLORIDE 20 MEQ: 750 CAPSULE, EXTENDED RELEASE ORAL at 08:52

## 2018-07-12 RX ADMIN — PANTOPRAZOLE SODIUM 40 MG: 40 TABLET, DELAYED RELEASE ORAL at 08:53

## 2018-07-12 RX ADMIN — METOPROLOL TARTRATE 50 MG: 25 TABLET ORAL at 21:05

## 2018-07-12 RX ADMIN — AMIODARONE HYDROCHLORIDE 400 MG: 200 TABLET ORAL at 21:04

## 2018-07-12 RX ADMIN — MUPIROCIN 10 APPLICATION: 20 OINTMENT TOPICAL at 08:53

## 2018-07-12 RX ADMIN — CYCLOBENZAPRINE 10 MG: 10 TABLET, FILM COATED ORAL at 07:33

## 2018-07-12 RX ADMIN — ATORVASTATIN CALCIUM 40 MG: 20 TABLET, FILM COATED ORAL at 21:04

## 2018-07-12 RX ADMIN — ASPIRIN 81 MG: 81 TABLET, DELAYED RELEASE ORAL at 08:52

## 2018-07-12 RX ADMIN — DOCUSATE SODIUM -SENNOSIDES 2 TABLET: 50; 8.6 TABLET, COATED ORAL at 21:04

## 2018-07-12 RX ADMIN — ESCITALOPRAM 10 MG: 10 TABLET, FILM COATED ORAL at 21:04

## 2018-07-12 NOTE — PLAN OF CARE
Problem: Patient Care Overview  Goal: Plan of Care Review  Outcome: Ongoing (interventions implemented as appropriate)   07/12/18 0619   Coping/Psychosocial   Plan of Care Reviewed With patient;spouse   Plan of Care Review   Progress improving   OTHER   Outcome Summary Vitals stable. No falls. pt c/o pain in midsternal incision, medicated per MAR. Chest tube to bulb suction remains in place. Wires I/T. Pt resting comfortably. Monitoring closely.        Problem: Skin Injury Risk (Adult)  Goal: Skin Health and Integrity  Outcome: Ongoing (interventions implemented as appropriate)   07/12/18 0619   Skin Injury Risk (Adult)   Skin Health and Integrity making progress toward outcome       Problem: Cardiac Surgery (Adult)  Goal: Signs and Symptoms of Listed Potential Problems Will be Absent, Minimized or Managed (Cardiac Surgery)  Outcome: Ongoing (interventions implemented as appropriate)   07/12/18 0619   Goal/Outcome Evaluation   Problems Assessed (Cardiac Surgery) all   Problems Present (Cardiac Surgery) pain;situational response

## 2018-07-12 NOTE — PROGRESS NOTES
Discharge Planning Assessment  [facility]     Patient Name: Sachin Tolliver  MRN: [id number]  Today's Date: 7/12/2018    Admit Date: 7/9/2018          Discharge Needs Assessment     Row Name 07/12/18 8834       Living Environment    Lives With spouse    Name(s) of Who Lives With Patient Yaneth Tolliver, spouse    Current Living Arrangements home/apartment/condo    Primary Care Provided by self    Provides Primary Care For pet(s)    Family Caregiver if Needed spouse    Family Caregiver Names Yaneth Tolliver    Quality of Family Relationships helpful;involved;supportive    Able to Return to Prior Arrangements yes       Resource/Environmental Concerns    Resource/Environmental Concerns none       Transition Planning    Patient/Family Anticipates Transition to home with family    Patient/Family Anticipated Services at Transition home health care    Transportation Anticipated family or friend will provide       Discharge Needs Assessment    Concerns to be Addressed no discharge needs identified    Equipment Currently Used at Home bipap/cpap    Anticipated Changes Related to Illness none    Equipment Needed After Discharge none    Offered/Gave Vendor List yes    Patient's Choice of Community Agency(s) [facility]            Discharge Plan     Row Name 07/12/18 4684       Plan    Plan Home with [facility]    Patient/Family in Agreement with Plan yes    Plan Comments Spoke with Pt at bedside.  CCP introduced self and role.  Pt confirmed information on face sheet.  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  Pt lives in a house with one entrance step.  Pt denies past home health, subacute rehab and DME.  Pt confirms pharmacy as [facility] on [facility].  Pt denies issues with affording his medications.  Pt plans to return home at discharge with 24-hour assistance of his spouse.  Pt has chosen [facility] to follow him at home.  CCP emailed referral to all three [facility] liaisons.  Pt denies needs at this  time.  CCP will continue to follow…HOLLAND DEAN/CCP        Destination     No service coordination in this encounter.      Durable Medical Equipment     No service coordination in this encounter.      Dialysis/Infusion     No service coordination in this encounter.      Home Medical Care     Service Request Status Selected Specialties Address Phone Number Fax Number    [facility] [address] [address] [phone]      Social Care     No service coordination in this encounter.                Demographic Summary     Row Name 07/12/18 1531       General Information    Admission Type inpatient    Arrived From home    Referral Source physician    Reason for Consult discharge planning    Preferred Language English     Used During This Interaction no       Contact Information    Permission Granted to Share Info With family/designee    Contact Information Obtained for facility             Functional Status     Row Name 07/12/18 1533       Functional Status    Usual Activity Tolerance good    Current Activity Tolerance moderate       Functional Status, IADL    Medications independent    Meal Preparation independent    Housekeeping independent    Laundry independent    Shopping independent       Mental Status    General Appearance WDL WDL       Mental Status Summary    Recent Changes in Mental Status/Cognitive Functioning no changes       Employment/    Employment Status employed full time    Shift Worked first shift    Current or Previous Occupation desk job            Psychosocial    No documentation.           Abuse/Neglect     Row Name 07/12/18 1534       Personal Safety    Feels Unsafe at Home or Work/School no    Feels Threatened by Someone no    Does Anyone Try to Keep You From Having Contact with Others or Doing Things Outside Your Home? no    Physical Signs of Abuse Present no             Legal    No documentation.           Substance Abuse    No documentation.           Patient Forms    No documentation.         Chrissy Alexander RN

## 2018-07-12 NOTE — PROGRESS NOTES
" LOS: 3 days   Patient Care Team:  Milvia Briceno MD as PCP - General    Chief Complaint: post op    Subjective:  Symptoms:  He reports chest pain.  No shortness of breath.  Chest pressure: mild insicional.    Diet:  Adequate intake.    Activity level: Returning to normal.    Pain:  He reports no pain.          Vital Signs  Temp:  [97.7 °F (36.5 °C)-98.5 °F (36.9 °C)] 98.1 °F (36.7 °C)  Heart Rate:  [73-81] 77  Resp:  [16-18] 16  BP: (116-159)/(76-96) 122/76  Body mass index is 33.25 kg/m².    Intake/Output Summary (Last 24 hours) at 07/12/18 1208  Last data filed at 07/12/18 1206   Gross per 24 hour   Intake             1420 ml   Output             3060 ml   Net            -1640 ml     I/O this shift:  In: 480 [P.O.:480]  Out: -     Chest tube drainage last 8 hours 30    1    07/09/18  0730 07/10/18  0500 07/12/18  0300   Weight: 115 kg (254 lb 9 oz) 115 kg (253 lb 14.4 oz) 117 kg (259 lb)         Objective:  General Appearance:  In no acute distress.    Vital signs: (most recent): Blood pressure 122/76, pulse 77, temperature 98.1 °F (36.7 °C), temperature source Oral, resp. rate 16, height 188 cm (74\"), weight 117 kg (259 lb), SpO2 94 %.  Vital signs are normal.    Output: Producing urine and no stool output.    HEENT: Normal HEENT exam.    Lungs:  Normal effort and normal respiratory rate.    Heart: Normal rate.  Regular rhythm.    Abdomen: Abdomen is soft.  Bowel sounds are normal.   There is no abdominal tenderness.     Extremities: Normal range of motion.    Pulses: Distal pulses are intact.    Neurological: Patient is alert and oriented to person, place and time.  Normal strength.    Pupils:  Pupils are equal, round, and reactive to light.    Skin:  Warm and dry.              Results Review:        WBC WBC   Date Value Ref Range Status   07/12/2018 14.29 (H) 4.50 - 10.70 10*3/mm3 Final   07/11/2018 10.16 4.50 - 10.70 10*3/mm3 Final   07/10/2018 10.92 (H) 4.50 - 10.70 10*3/mm3 Final   07/10/2018 12.02 (H) " 4.50 - 10.70 10*3/mm3 Final   07/10/2018 7.86 4.50 - 10.70 10*3/mm3 Final   07/09/2018 5.85 4.50 - 10.70 10*3/mm3 Final      HGB Hemoglobin   Date Value Ref Range Status   07/12/2018 11.3 (L) 13.7 - 17.6 g/dL Final   07/11/2018 10.8 (L) 13.7 - 17.6 g/dL Final   07/10/2018 11.0 (L) 13.7 - 17.6 g/dL Final   07/10/2018 11.9 (L) 13.7 - 17.6 g/dL Final   07/10/2018 10.2 (L) 12.0 - 17.0 g/dL Final   07/10/2018 10.2 (L) 12.0 - 17.0 g/dL Final   07/10/2018 10.5 (L) 12.0 - 17.0 g/dL Final   07/10/2018 10.5 (L) 12.0 - 17.0 g/dL Final   07/10/2018 13.6 12.0 - 17.0 g/dL Final   07/10/2018 13.8 13.7 - 17.6 g/dL Final   07/09/2018 13.0 (L) 13.7 - 17.6 g/dL Final      HCT Hematocrit   Date Value Ref Range Status   07/12/2018 34.7 (L) 40.4 - 52.2 % Final   07/11/2018 32.5 (L) 40.4 - 52.2 % Final   07/10/2018 32.6 (L) 40.4 - 52.2 % Final   07/10/2018 34.4 (L) 40.4 - 52.2 % Final   07/10/2018 30 (L) 38 - 51 % Final   07/10/2018 30 (L) 38 - 51 % Final   07/10/2018 31 (L) 38 - 51 % Final   07/10/2018 31 (L) 38 - 51 % Final   07/10/2018 40 38 - 51 % Final   07/10/2018 42.6 40.4 - 52.2 % Final   07/09/2018 39.5 (L) 40.4 - 52.2 % Final      Platelets Platelets   Date Value Ref Range Status   07/12/2018 129 (L) 140 - 500 10*3/mm3 Final   07/11/2018 120 (L) 140 - 500 10*3/mm3 Final   07/10/2018 123 (L) 140 - 500 10*3/mm3 Final   07/10/2018 170 140 - 500 10*3/mm3 Final   07/10/2018 210 140 - 500 10*3/mm3 Final   07/09/2018 219 140 - 500 10*3/mm3 Final        PT/INR:    Protime   Date Value Ref Range Status   07/11/2018 15.8 (H) 11.7 - 14.2 Seconds Final   07/10/2018 16.4 (H) 11.7 - 14.2 Seconds Final   07/09/2018 13.4 11.7 - 14.2 Seconds Final   /  INR   Date Value Ref Range Status   07/11/2018 1.28 (H) 0.90 - 1.10 Final   07/10/2018 1.35 (H) 0.90 - 1.10 Final   07/09/2018 1.04 0.90 - 1.10 Final       Sodium Sodium   Date Value Ref Range Status   07/12/2018 132 (L) 136 - 145 mmol/L Final   07/11/2018 140 136 - 145 mmol/L Final   07/10/2018  141 136 - 145 mmol/L Final   07/10/2018 139 136 - 145 mmol/L Final   07/10/2018 138 136 - 145 mmol/L Final   07/09/2018 139 136 - 145 mmol/L Final      Potassium Potassium   Date Value Ref Range Status   07/12/2018 3.7 3.5 - 5.2 mmol/L Final   07/11/2018 4.1 3.5 - 5.2 mmol/L Final   07/10/2018 4.5 3.5 - 5.2 mmol/L Final   07/10/2018 3.8 3.5 - 5.2 mmol/L Final   07/10/2018 3.9 3.5 - 5.2 mmol/L Final   07/10/2018 3.8 3.5 - 5.2 mmol/L Final   07/09/2018 3.8 3.5 - 5.2 mmol/L Final      Chloride Chloride   Date Value Ref Range Status   07/12/2018 94 (L) 98 - 107 mmol/L Final   07/11/2018 103 98 - 107 mmol/L Final   07/10/2018 103 98 - 107 mmol/L Final   07/10/2018 102 98 - 107 mmol/L Final   07/10/2018 101 98 - 107 mmol/L Final   07/09/2018 104 98 - 107 mmol/L Final      Bicarbonate CO2   Date Value Ref Range Status   07/12/2018 25.6 22.0 - 29.0 mmol/L Final   07/11/2018 24.9 22.0 - 29.0 mmol/L Final   07/10/2018 23.5 22.0 - 29.0 mmol/L Final   07/10/2018 21.9 (L) 22.0 - 29.0 mmol/L Final   07/10/2018 23.4 22.0 - 29.0 mmol/L Final   07/09/2018 23.8 22.0 - 29.0 mmol/L Final      BUN BUN   Date Value Ref Range Status   07/12/2018 12 8 - 23 mg/dL Final   07/11/2018 12 8 - 23 mg/dL Final   07/10/2018 12 8 - 23 mg/dL Final   07/10/2018 13 8 - 23 mg/dL Final   07/10/2018 15 8 - 23 mg/dL Final   07/09/2018 16 8 - 23 mg/dL Final      Creatinine Creatinine   Date Value Ref Range Status   07/12/2018 1.01 0.76 - 1.27 mg/dL Final   07/11/2018 0.98 0.76 - 1.27 mg/dL Final   07/10/2018 1.08 0.76 - 1.27 mg/dL Final   07/10/2018 1.21 0.76 - 1.27 mg/dL Final   07/10/2018 1.09 0.76 - 1.27 mg/dL Final   07/09/2018 1.11 0.76 - 1.27 mg/dL Final      Calcium Calcium   Date Value Ref Range Status   07/12/2018 8.6 8.6 - 10.5 mg/dL Final   07/11/2018 8.2 (L) 8.6 - 10.5 mg/dL Final   07/10/2018 8.5 (L) 8.6 - 10.5 mg/dL Final   07/10/2018 8.8 8.6 - 10.5 mg/dL Final   07/10/2018 9.0 8.6 - 10.5 mg/dL Final   07/09/2018 9.1 8.6 - 10.5 mg/dL Final       Magnesium Magnesium   Date Value Ref Range Status   07/11/2018 2.5 (H) 1.6 - 2.4 mg/dL Final   07/10/2018 2.7 (H) 1.6 - 2.4 mg/dL Final   07/10/2018 2.5 (H) 1.6 - 2.4 mg/dL Final   07/10/2018 3.0 (H) 1.6 - 2.4 mg/dL Final   07/09/2018 2.3 1.6 - 2.4 mg/dL Final            amiodarone 400 mg Oral Q12H   aspirin 81 mg Oral Daily   atorvastatin 40 mg Oral Nightly   chlorhexidine 15 mL Mouth/Throat Q12H   escitalopram 10 mg Oral Daily   insulin aspart 0-7 Units Subcutaneous 4x Daily With Meals & Nightly   metoprolol tartrate 50 mg Oral Q12H   mupirocin  Each Nare BID   pantoprazole 40 mg Oral Daily   potassium chloride 20 mEq Oral Daily   sennosides-docusate sodium 2 tablet Oral Nightly       insulin regular infusion 1 unit/mL (CCU use) 0-50 Units/hr Last Rate: Stopped (07/11/18 0705)   sodium chloride 30 mL/hr Last Rate: 30 mL/hr (07/10/18 1119)   sodium chloride 30 mL/hr Last Rate: Stopped (07/11/18 0705)           Patient Active Problem List   Diagnosis Code   • Benign essential hypertension I10   • Hemolytic anemia (CMS/[facility]) D58.9   • Hyperlipidemia E78.5   • Migraine G43.909   • Situational depression F43.21   • Aortic valve calcification I35.9   • Aortic stenosis due to bicuspid aortic valve Q23.0, Q23.1   • Obesity, morbid (CMS/[facility]) E66.01   • Nonrheumatic aortic valve stenosis I35.0       Assessment & Plan    -Severe aortic stenosis- s/p AVR- tissue, CABGx1 vein graft to diagonal LAD--POD#2 [facility]  -Single vessel CAD  -LV hypertrophy, preserved function EF 65%  -Hypertension   -Depression- on lexapro  -Migraines  -Hyperlipidemia- on statin  -Post op anemia, expected ABL  -leukocytosis - probably reactive     On room air  SR rate 72      Discontinue remaining tube. Start PO lasix.  Mobilize.  Looks great.     MITCHELL Solis  07/12/18  12:08 PM

## 2018-07-12 NOTE — THERAPY TREATMENT NOTE
Acute Care - Physical Therapy Treatment Note  [facility]     Patient Name: Sachin Tolliver  : 1955  MRN: [id number]  Today's Date: 2018  Onset of Illness/Injury or Date of Surgery: 18  Date of Referral to PT: 18  Referring Physician: Flash    Admit Date: 2018    Visit Dx:    ICD-10-CM ICD-9-CM   1. Aortic stenosis due to bicuspid aortic valve Q23.0 746.3    Q23.1 746.4   2. Nonrheumatic aortic valve stenosis I35.0 424.1     Patient Active Problem List   Diagnosis   • Benign essential hypertension   • Hemolytic anemia (CMS/HCC)   • Hyperlipidemia   • Migraine   • Situational depression   • Aortic valve calcification   • Aortic stenosis due to bicuspid aortic valve   • Obesity, morbid (CMS/HCC)   • Nonrheumatic aortic valve stenosis       Therapy Treatment          Rehabilitation Treatment Summary     Row Name 18             Treatment Time/Intention    Discipline physical therapist  -MD      Document Type therapy note (daily note)  -MD      Subjective Information no complaints  -MD      Mode of Treatment physical therapy  -MD      Patient/Family Observations Pt seated in chair showing no signs of acute distress  -MD      Therapy Frequency (PT Clinical Impression) daily  -MD      Patient Effort good  -MD      Existing Precautions/Restrictions cardiac;fall;sternal  -MD      Recorded by [MD] Patti Bates, PT 18      Row Name 18             Cognitive Assessment/Intervention- PT/OT    Orientation Status (Cognition) oriented x 3  -MD      Follows Commands (Cognition) WNL  -MD      Recorded by [MD] Patti Bates, PT 18      Row Name 18             Sit-Stand Transfer    Sit-Stand [place] (Transfers) verbal cues;minimum assist (75% patient effort)  -MD      Recorded by [MD] Patti Bates, PT 18      Row Name 18             Stand-Sit Transfer    Stand-Sit [place] (Transfers) verbal cues;minimum assist (75% patient  effort)  -MD      Recorded by [MD] Patti Bates, PT 07/12/18 0905      Row Name 07/12/18 0903             Gait/Stairs Assessment/Training    [place] Level (Gait) verbal cues;contact guard;stand by assist  -MD      Distance in Feet (Gait) 240  -MD      Deviations/Abnormal Patterns (Gait) ataxic   at times  -MD      Recorded by [MD] Patti Bates, PT 07/12/18 0905      Row Name 07/12/18 0903             Positioning and Restraints    Pre-Treatment Position sitting in chair/recliner  -MD      Post Treatment Position chair  -MD      In Chair reclined;sitting;call light within reach  -MD      Recorded by [MD] Patti Bates, PT 07/12/18 0905      Row Name 07/12/18 0903             Pain Assessment    Additional Documentation Pain Scale: Word Pre/Post-Treatment (Group)  -MD      Recorded by [MD] Patti Bates, PT 07/12/18 0905      Row Name 07/12/18 0903             Pain Scale: Word Pre/Post-Treatment    Pain: Word Scale, Pretreatment 2 - mild pain  -MD      Recorded by [MD] Patti Bates, PT 07/12/18 0905      Row Name                Wound 07/10/18 0943 Other (See comments) chest incision    Wound - Properties Group Date first assessed: 07/10/18 [OD] Time first assessed: 0943 [OD] Side: Other (See comments) [OD] Location: chest [OD] Type: incision [OD] Recorded by:  [OD] Ilya Uriostegui Jr., RN 07/10/18 0943    Row Name                Wound 07/10/18 0943 Left leg incision    Wound - Properties Group Date first assessed: 07/10/18 [OD] Time first assessed: 0943 [OD] Side: Left [OD] Location: leg [OD] Type: incision [OD] Recorded by:  [OD] Ilya Uriostegui Jr., RN 07/10/18 0943      User Key  (r) = Recorded By, (t) = Taken By, (c) = Cosigned By    Initials Name Effective Dates Discipline    OD Ilya Uriostegui Jr. RN 06/16/16 -  Nurse    MD Patti Bates, PT 04/03/18 -  PT          Wound 07/10/18 0943 Other (See comments) chest incision (Active)   Dressing Appearance dry;intact 7/12/2018  4:49 AM   Closure ABUNDIO 7/12/2018  4:49 AM   Base dressing in  place, unable to visualize 7/12/2018  4:49 AM   Drainage Amount none 7/12/2018  4:49 AM   Dressing Care, Wound border dressing 7/12/2018  4:49 AM       Wound 07/10/18 0943 Left leg incision (Active)   Dressing Appearance open to air 7/12/2018  4:49 AM   Closure Liquid skin adhesive 7/12/2018  4:49 AM   Drainage Amount none 7/12/2018  4:49 AM   Dressing Care, Wound open to air 7/12/2018  4:49 AM             Physical Therapy Education     Title: PT OT SLP Therapies (Active)     Topic: Physical Therapy (Active)     Point: Precautions (Done)    Learning Progress Summary     Learner Status Readiness Method Response Comment Documented by    Patient Done Acceptance E ERICK READ 07/12/18 0905     Done Acceptance E ERICK READ 07/11/18 0853                      User Key     Initials Effective Dates Name Provider Type Discipline    MD 04/03/18 -  Patti Bates, PT Physical Therapist PT                    PT Recommendation and Plan  Anticipated Discharge Disposition (PT): home with home health  Planned Therapy Interventions (PT Eval): bed mobility training, gait training, balance training, strengthening, transfer training  Therapy Frequency (PT Clinical Impression): daily  Outcome Summary/Treatment Plan (PT)  Anticipated Discharge Disposition (PT): home with home health  Plan of Care Reviewed With: patient  Progress: improving  Outcome Summary: Pt progressing w ambulation and transfers this am.  PT encouraged pt to ambulate around unit with family to increase mobility and prepare for going home.          Outcome Measures     Row Name 07/12/18 0900 07/11/18 0800          How much help from another person do you currently need...    Turning from your back to your side while in flat bed without using bedrails? 3  -MD 2  -MD     Moving from lying on back to sitting on the side of a flat bed without bedrails? 3  -MD 2  -MD     Moving to and from a bed to a chair (including a wheelchair)? 3  -MD 3  -MD     Standing up from a chair using your  arms (e.g., wheelchair, bedside chair)? 3  -MD 3  -MD     Climbing 3-5 steps with a railing? 3  -MD 2  -MD     To walk in hospital room? 3  -MD 3  -MD     AM-PAC 6 Clicks Score 18  -MD 15  -MD        Functional Assessment    Outcome Measure Options AM-PAC 6 Clicks Basic Mobility (PT)  -MD AM-PAC 6 Clicks Basic Mobility (PT)  -MD       User Key  (r) = Recorded By, (t) = Taken By, (c) = Cosigned By    Initials Name Provider Type    MD Patti Bates, PT Physical Therapist           Time Calculation:         PT Charges     Row Name 07/12/18 0902             Time Calculation    Start Time 0828  -MD      Stop Time 0838  -MD      Time Calculation (min) 10 min  -MD      PT Received On 07/12/18  -MD      PT - Next Appointment 07/13/18  -MD        User Key  (r) = Recorded By, (t) = Taken By, (c) = Cosigned By    Initials Name Provider Type    MD Patti Bates, PT Physical Therapist        Therapy Suggested Charges     Code   Minutes Charges    None           Therapy Charges for Today     Code Description Service Date Service Provider Modifiers Qty    57989688169 HC PT EVAL MOD COMPLEXITY 2 7/11/2018 Patti Bates, PT GP 1    95501083162 HC PT THER PROC EA 15 MIN 7/11/2018 Patti Bates, PT GP 1    35932294007 HC PT THER SUPP EA 15 MIN 7/11/2018 Patti Bates, PT GP 1    26807650637 HC PT THER PROC EA 15 MIN 7/12/2018 Patti Bates, PT GP 1          PT G-Codes  Outcome Measure Options: AM-PAC 6 Clicks Basic Mobility (PT)    Patti Bates PT  7/12/2018

## 2018-07-12 NOTE — PROGRESS NOTES
Hospital Follow Up      Chief Complaint: Follow up CAD status post CABG x 1, AVR    Interval History:  Feeling well overall.  Pain is much better.     Objective:     Objective:  Temp:  [97.3 °F (36.3 °C)-98.5 °F (36.9 °C)] 97.3 °F (36.3 °C)  Heart Rate:  [73-81] 77  Resp:  [16-18] 16  BP: (116-159)/(73-96) 118/73     Intake/Output Summary (Last 24 hours) at 07/12/18 1657  Last data filed at 07/12/18 1206   Gross per 24 hour   Intake             1300 ml   Output             2960 ml   Net            -1660 ml     Body mass index is 33.25 kg/m².  1    07/09/18  0730 07/10/18  0500 07/12/18  0300   Weight: 115 kg (254 lb 9 oz) 115 kg (253 lb 14.4 oz) 117 kg (259 lb)     Weight change:       Physical Exam:   General : Alert, cooperative, in no acute distress.  Neuro: alert,cooperative and oriented  Lungs: CTAB. Normal respiratory effort and rate.  CV:: Regular rate and rhythm, normal S1 and S2, no murmurs, gallops or rubs.  ABD: Soft, nontender, non-distended. positive bowel sounds  Extr: No edema or cyanosis, moves all extremities    Lab Review:     Results from last 7 days  Lab Units 07/12/18  0313 07/11/18  0253  07/09/18  1433   SODIUM mmol/L 132* 140  < > 139   POTASSIUM mmol/L 3.7 4.1  < > 3.8   CHLORIDE mmol/L 94* 103  < > 104   CO2 mmol/L 25.6 24.9  < > 23.8   BUN mg/dL 12 12  < > 16   CREATININE mg/dL 1.01 0.98  < > 1.11   GLUCOSE mg/dL 109* 126*  < > 99   CALCIUM mg/dL 8.6 8.2*  < > 9.1   AST (SGOT) U/L  --   --   --  16   ALT (SGPT) U/L  --   --   --  16   < > = values in this interval not displayed.        Results from last 7 days  Lab Units 07/12/18  0313 07/11/18  0253   WBC 10*3/mm3 14.29* 10.16   HEMOGLOBIN g/dL 11.3* 10.8*   HEMATOCRIT % 34.7* 32.5*   PLATELETS 10*3/mm3 129* 120*       Results from last 7 days  Lab Units 07/11/18  0253 07/10/18  1038 07/09/18  1433   INR  1.28* 1.35* 1.04   APTT seconds  --  32.5 26.6       Results from last 7 days  Lab Units 07/11/18  0253 07/10/18  1843   MAGNESIUM  mg/dL 2.5* 2.7*       Results from last 7 days  Lab Units 07/09/18  1433   CHOLESTEROL mg/dL 174   TRIGLYCERIDES mg/dL 178*   HDL CHOL mg/dL 36*       Results from last 7 days  Lab Units 07/09/18  1433   PROBNP pg/mL 225.9         I reviewed the patient's new clinical results.  I personally viewed and interpreted the patient's EKG  Current Medications:   Scheduled Meds:  amiodarone 400 mg Oral Q12H   aspirin 81 mg Oral Daily   atorvastatin 40 mg Oral Nightly   chlorhexidine 15 mL Mouth/Throat Q12H   escitalopram 10 mg Oral Daily   insulin aspart 0-7 Units Subcutaneous 4x Daily With Meals & Nightly   metoprolol tartrate 50 mg Oral Q12H   mupirocin  Each Nare BID   pantoprazole 40 mg Oral Daily   potassium chloride 20 mEq Oral Daily   sennosides-docusate sodium 2 tablet Oral Nightly     Continuous Infusions:  insulin regular infusion 1 unit/mL (CCU use) 0-50 Units/hr Last Rate: Stopped (07/11/18 0705)   sodium chloride 30 mL/hr Last Rate: 30 mL/hr (07/10/18 1119)   sodium chloride 30 mL/hr Last Rate: Stopped (07/11/18 0705)       Allergies:  No Known Allergies    Assessment/Plan:     1.  Bicuspid aortic valve with stenosis status post bioprosthetic aortic valve replacement. POD #2.   2.  Single vessel coronary artery disease.  Status post SVG to first diagonal branch.  POD # 2.   3.  Post-op anemia. Improving.  4.  Post-op thrombocytopenia. Improving.  5.  Hypertension. Controlled.  6.  Hyperlipidemia  7.  Depression    -  Appears to be recovering well.   -  Agree with current management    Alexandria Ashraf MD  07/12/18  4:57 PM

## 2018-07-12 NOTE — PLAN OF CARE
Problem: Patient Care Overview  Goal: Plan of Care Review   07/12/18 0906   Coping/Psychosocial   Plan of Care Reviewed With patient   Plan of Care Review   Progress improving   OTHER   Outcome Summary Pt progressing w ambulation and transfers this am. PT encouraged pt to ambulate around unit with family to increase mobility and prepare for going home.

## 2018-07-13 ENCOUNTER — APPOINTMENT (OUTPATIENT)
Dept: GENERAL RADIOLOGY | Facility: HOSPITAL | Age: 63
End: 2018-07-13

## 2018-07-13 LAB
ABO + RH BLD: NORMAL
ABO + RH BLD: NORMAL
ANION GAP SERPL CALCULATED.3IONS-SCNC: 11.5 MMOL/L
BH BB BLOOD EXPIRATION DATE: NORMAL
BH BB BLOOD EXPIRATION DATE: NORMAL
BH BB BLOOD TYPE BARCODE: 6200
BH BB BLOOD TYPE BARCODE: 6200
BH BB DISPENSE STATUS: NORMAL
BH BB DISPENSE STATUS: NORMAL
BH BB PRODUCT CODE: NORMAL
BH BB PRODUCT CODE: NORMAL
BH BB UNIT NUMBER: NORMAL
BH BB UNIT NUMBER: NORMAL
BUN BLD-MCNC: 15 MG/DL (ref 8–23)
BUN/CREAT SERPL: 17 (ref 7–25)
CALCIUM SPEC-SCNC: 9.1 MG/DL (ref 8.6–10.5)
CHLORIDE SERPL-SCNC: 99 MMOL/L (ref 98–107)
CO2 SERPL-SCNC: 24.5 MMOL/L (ref 22–29)
CREAT BLD-MCNC: 0.88 MG/DL (ref 0.76–1.27)
DEPRECATED RDW RBC AUTO: 47.1 FL (ref 37–54)
ERYTHROCYTE [DISTWIDTH] IN BLOOD BY AUTOMATED COUNT: 13.8 % (ref 11.5–14.5)
GFR SERPL CREATININE-BSD FRML MDRD: 88 ML/MIN/1.73
GLUCOSE BLD-MCNC: 106 MG/DL (ref 65–99)
GLUCOSE BLDC GLUCOMTR-MCNC: 105 MG/DL (ref 70–130)
GLUCOSE BLDC GLUCOMTR-MCNC: 119 MG/DL (ref 70–130)
GLUCOSE BLDC GLUCOMTR-MCNC: 86 MG/DL (ref 70–130)
HCT VFR BLD AUTO: 34 % (ref 40.4–52.2)
HGB BLD-MCNC: 10.9 G/DL (ref 13.7–17.6)
MCH RBC QN AUTO: 29.9 PG (ref 27–32.7)
MCHC RBC AUTO-ENTMCNC: 32.1 G/DL (ref 32.6–36.4)
MCV RBC AUTO: 93.2 FL (ref 79.8–96.2)
PLATELET # BLD AUTO: 147 10*3/MM3 (ref 140–500)
PMV BLD AUTO: 11.9 FL (ref 6–12)
POTASSIUM BLD-SCNC: 3.8 MMOL/L (ref 3.5–5.2)
RBC # BLD AUTO: 3.65 10*6/MM3 (ref 4.6–6)
SODIUM BLD-SCNC: 135 MMOL/L (ref 136–145)
UNIT  ABO: NORMAL
UNIT  ABO: NORMAL
UNIT  RH: NORMAL
UNIT  RH: NORMAL
WBC NRBC COR # BLD: 11.88 10*3/MM3 (ref 4.5–10.7)

## 2018-07-13 PROCEDURE — 82962 GLUCOSE BLOOD TEST: CPT

## 2018-07-13 PROCEDURE — 85027 COMPLETE CBC AUTOMATED: CPT | Performed by: THORACIC SURGERY (CARDIOTHORACIC VASCULAR SURGERY)

## 2018-07-13 PROCEDURE — 71046 X-RAY EXAM CHEST 2 VIEWS: CPT

## 2018-07-13 PROCEDURE — 94799 UNLISTED PULMONARY SVC/PX: CPT | Performed by: NURSE PRACTITIONER

## 2018-07-13 PROCEDURE — 99232 SBSQ HOSP IP/OBS MODERATE 35: CPT | Performed by: INTERNAL MEDICINE

## 2018-07-13 PROCEDURE — 97110 THERAPEUTIC EXERCISES: CPT

## 2018-07-13 PROCEDURE — 71045 X-RAY EXAM CHEST 1 VIEW: CPT

## 2018-07-13 PROCEDURE — 94762 N-INVAS EAR/PLS OXIMTRY CONT: CPT

## 2018-07-13 PROCEDURE — 80048 BASIC METABOLIC PNL TOTAL CA: CPT | Performed by: THORACIC SURGERY (CARDIOTHORACIC VASCULAR SURGERY)

## 2018-07-13 RX ORDER — BISACODYL 10 MG
10 SUPPOSITORY, RECTAL RECTAL ONCE
Status: DISCONTINUED | OUTPATIENT
Start: 2018-07-13 | End: 2018-07-14 | Stop reason: HOSPADM

## 2018-07-13 RX ORDER — HYDROCODONE BITARTRATE AND ACETAMINOPHEN 5; 325 MG/1; MG/1
2 TABLET ORAL EVERY 4 HOURS PRN
Qty: 80 TABLET | Refills: 0 | Status: SHIPPED | OUTPATIENT
Start: 2018-07-13 | End: 2018-07-20

## 2018-07-13 RX ORDER — POTASSIUM CHLORIDE 750 MG/1
20 CAPSULE, EXTENDED RELEASE ORAL DAILY
Status: DISCONTINUED | OUTPATIENT
Start: 2018-07-13 | End: 2018-07-13

## 2018-07-13 RX ORDER — BUMETANIDE 0.25 MG/ML
2 INJECTION INTRAMUSCULAR; INTRAVENOUS ONCE
Status: COMPLETED | OUTPATIENT
Start: 2018-07-13 | End: 2018-07-13

## 2018-07-13 RX ORDER — POLYETHYLENE GLYCOL 3350 17 G/17G
17 POWDER, FOR SOLUTION ORAL DAILY
Status: DISCONTINUED | OUTPATIENT
Start: 2018-07-13 | End: 2018-07-14 | Stop reason: HOSPADM

## 2018-07-13 RX ORDER — FUROSEMIDE 40 MG/1
40 TABLET ORAL DAILY
Status: DISCONTINUED | OUTPATIENT
Start: 2018-07-13 | End: 2018-07-14 | Stop reason: HOSPADM

## 2018-07-13 RX ADMIN — AMIODARONE HYDROCHLORIDE 400 MG: 200 TABLET ORAL at 09:03

## 2018-07-13 RX ADMIN — ASPIRIN 81 MG: 81 TABLET, DELAYED RELEASE ORAL at 09:03

## 2018-07-13 RX ADMIN — METOPROLOL TARTRATE 50 MG: 25 TABLET ORAL at 09:02

## 2018-07-13 RX ADMIN — MUPIROCIN 10 APPLICATION: 20 OINTMENT TOPICAL at 09:03

## 2018-07-13 RX ADMIN — ATORVASTATIN CALCIUM 40 MG: 20 TABLET, FILM COATED ORAL at 22:06

## 2018-07-13 RX ADMIN — PANTOPRAZOLE SODIUM 40 MG: 40 TABLET, DELAYED RELEASE ORAL at 09:02

## 2018-07-13 RX ADMIN — POLYETHYLENE GLYCOL 3350 17 G: 17 POWDER, FOR SOLUTION ORAL at 12:33

## 2018-07-13 RX ADMIN — BUMETANIDE 2 MG: 0.25 INJECTION INTRAMUSCULAR; INTRAVENOUS at 16:00

## 2018-07-13 RX ADMIN — MUPIROCIN 10 APPLICATION: 20 OINTMENT TOPICAL at 22:02

## 2018-07-13 RX ADMIN — DOCUSATE SODIUM -SENNOSIDES 2 TABLET: 50; 8.6 TABLET, COATED ORAL at 22:06

## 2018-07-13 RX ADMIN — AMIODARONE HYDROCHLORIDE 400 MG: 200 TABLET ORAL at 22:05

## 2018-07-13 RX ADMIN — POTASSIUM CHLORIDE 20 MEQ: 750 CAPSULE, EXTENDED RELEASE ORAL at 09:03

## 2018-07-13 RX ADMIN — ESCITALOPRAM 10 MG: 10 TABLET, FILM COATED ORAL at 22:05

## 2018-07-13 RX ADMIN — FUROSEMIDE 40 MG: 40 TABLET ORAL at 09:02

## 2018-07-13 RX ADMIN — METOPROLOL TARTRATE 50 MG: 25 TABLET ORAL at 22:06

## 2018-07-13 NOTE — PLAN OF CARE
Problem: Patient Care Overview  Goal: Plan of Care Review  Outcome: Ongoing (interventions implemented as appropriate)      Problem: Skin Injury Risk (Adult)  Goal: Skin Health and Integrity  Outcome: Ongoing (interventions implemented as appropriate)      Problem: Cardiac Surgery (Adult)  Goal: Signs and Symptoms of Listed Potential Problems Will be Absent, Minimized or Managed (Cardiac Surgery)  Outcome: Ongoing (interventions implemented as appropriate)

## 2018-07-13 NOTE — PROGRESS NOTES
Hospital Follow Up      Chief Complaint: Follow up CAD status post CABG x 1, AVR    Interval History:  Feels well this morning.     Objective:     Objective:  Temp:  [97.3 °F (36.3 °C)-99.3 °F (37.4 °C)] 97.9 °F (36.6 °C)  Heart Rate:  [64-78] 71  Resp:  [16] 16  BP: (115-129)/(64-94) 115/64     Intake/Output Summary (Last 24 hours) at 07/13/18 0832  Last data filed at 07/13/18 0700   Gross per 24 hour   Intake              960 ml   Output             1575 ml   Net             -615 ml     Body mass index is 33.25 kg/m².  1    07/09/18  0730 07/10/18  0500 07/12/18  0300   Weight: 115 kg (254 lb 9 oz) 115 kg (253 lb 14.4 oz) 117 kg (259 lb)     Weight change:       Physical Exam:   General : Alert, cooperative, in no acute distress.  Neuro: alert,cooperative and oriented  Lungs: CTAB. Normal respiratory effort and rate.  CV:: Regular rate and rhythm, normal S1 and S2, no murmurs, gallops or rubs.  ABD: Soft, nontender, non-distended. positive bowel sounds  Extr: No edema or cyanosis, moves all extremities    Lab Review:     Results from last 7 days  Lab Units 07/13/18  0339 07/12/18  0313  07/09/18  1433   SODIUM mmol/L 135* 132*  < > 139   POTASSIUM mmol/L 3.8 3.7  < > 3.8   CHLORIDE mmol/L 99 94*  < > 104   CO2 mmol/L 24.5 25.6  < > 23.8   BUN mg/dL 15 12  < > 16   CREATININE mg/dL 0.88 1.01  < > 1.11   GLUCOSE mg/dL 106* 109*  < > 99   CALCIUM mg/dL 9.1 8.6  < > 9.1   AST (SGOT) U/L  --   --   --  16   ALT (SGPT) U/L  --   --   --  16   < > = values in this interval not displayed.        Results from last 7 days  Lab Units 07/13/18  0339 07/12/18  0313   WBC 10*3/mm3 11.88* 14.29*   HEMOGLOBIN g/dL 10.9* 11.3*   HEMATOCRIT % 34.0* 34.7*   PLATELETS 10*3/mm3 147 129*       Results from last 7 days  Lab Units 07/11/18  0253 07/10/18  1038 07/09/18  1433   INR  1.28* 1.35* 1.04   APTT seconds  --  32.5 26.6       Results from last 7 days  Lab Units 07/11/18  0253 07/10/18  1843   MAGNESIUM mg/dL 2.5* 2.7*        Results from last 7 days  Lab Units 07/09/18  1433   CHOLESTEROL mg/dL 174   TRIGLYCERIDES mg/dL 178*   HDL CHOL mg/dL 36*       Results from last 7 days  Lab Units 07/09/18  1433   PROBNP pg/mL 225.9         I reviewed the patient's new clinical results.  I personally viewed and interpreted the patient's EKG  Current Medications:   Scheduled Meds:  amiodarone 400 mg Oral Q12H   aspirin 81 mg Oral Daily   atorvastatin 40 mg Oral Nightly   chlorhexidine 15 mL Mouth/Throat Q12H   escitalopram 10 mg Oral Daily   furosemide 40 mg Oral Daily   insulin aspart 0-7 Units Subcutaneous 4x Daily With Meals & Nightly   metoprolol tartrate 50 mg Oral Q12H   mupirocin  Each Nare BID   pantoprazole 40 mg Oral Daily   potassium chloride 20 mEq Oral Daily   potassium chloride 20 mEq Oral Daily   sennosides-docusate sodium 2 tablet Oral Nightly     Continuous Infusions:  insulin regular infusion 1 unit/mL (CCU use) 0-50 Units/hr Last Rate: Stopped (07/11/18 0705)   sodium chloride 30 mL/hr Last Rate: 30 mL/hr (07/10/18 1119)   sodium chloride 30 mL/hr Last Rate: Stopped (07/11/18 0705)       Allergies:  No Known Allergies    Assessment/Plan:     1.  Bicuspid aortic valve with stenosis status post bioprosthetic aortic valve replacement. POD #2.   2.  Single vessel coronary artery disease.  Status post SVG to first diagonal branch.  POD # 2.   3.  Post-op anemia. Improving.  4.  Post-op thrombocytopenia. Improving.  5.  Hypertension. Controlled.  6.  Hyperlipidemia  7.  Depression     -  Anticipate discharge soon, possibly next 1 to 2 days if ok with Dr. Vidales.    Alexandria Ashraf MD  07/13/18  8:32 AM

## 2018-07-13 NOTE — PROGRESS NOTES
LOS: 4 days   Patient Care Team:  Milvia Briceno MD as PCP - General    Chief Complaint: post op    Subjective      Vital Signs  Temp:  [97.3 °F (36.3 °C)-99.3 °F (37.4 °C)] 97.9 °F (36.6 °C)  Heart Rate:  [64-78] 73  Resp:  [16] 16  BP: (115-129)/(64-94) 115/64  Body mass index is 33.25 kg/m².    Intake/Output Summary (Last 24 hours) at 07/13/18 1021  Last data filed at 07/13/18 0700   Gross per 24 hour   Intake              960 ml   Output             1575 ml   Net             -615 ml     No intake/output data recorded.    Chest tube drainage last 8 hours 30    1    07/09/18  0730 07/10/18  0500 07/12/18  0300   Weight: 115 kg (254 lb 9 oz) 115 kg (253 lb 14.4 oz) 117 kg (259 lb)         Objective    Results Review:        WBC WBC   Date Value Ref Range Status   07/13/2018 11.88 (H) 4.50 - 10.70 10*3/mm3 Final   07/12/2018 14.29 (H) 4.50 - 10.70 10*3/mm3 Final   07/11/2018 10.16 4.50 - 10.70 10*3/mm3 Final   07/10/2018 10.92 (H) 4.50 - 10.70 10*3/mm3 Final   07/10/2018 12.02 (H) 4.50 - 10.70 10*3/mm3 Final      HGB Hemoglobin   Date Value Ref Range Status   07/13/2018 10.9 (L) 13.7 - 17.6 g/dL Final   07/12/2018 11.3 (L) 13.7 - 17.6 g/dL Final   07/11/2018 10.8 (L) 13.7 - 17.6 g/dL Final   07/10/2018 11.0 (L) 13.7 - 17.6 g/dL Final   07/10/2018 11.9 (L) 13.7 - 17.6 g/dL Final      HCT Hematocrit   Date Value Ref Range Status   07/13/2018 34.0 (L) 40.4 - 52.2 % Final   07/12/2018 34.7 (L) 40.4 - 52.2 % Final   07/11/2018 32.5 (L) 40.4 - 52.2 % Final   07/10/2018 32.6 (L) 40.4 - 52.2 % Final   07/10/2018 34.4 (L) 40.4 - 52.2 % Final      Platelets Platelets   Date Value Ref Range Status   07/13/2018 147 140 - 500 10*3/mm3 Final   07/12/2018 129 (L) 140 - 500 10*3/mm3 Final   07/11/2018 120 (L) 140 - 500 10*3/mm3 Final   07/10/2018 123 (L) 140 - 500 10*3/mm3 Final   07/10/2018 170 140 - 500 10*3/mm3 Final        PT/INR:    Protime   Date Value Ref Range Status   07/11/2018 15.8 (H) 11.7 - 14.2 Seconds Final    07/10/2018 16.4 (H) 11.7 - 14.2 Seconds Final   /  INR   Date Value Ref Range Status   07/11/2018 1.28 (H) 0.90 - 1.10 Final   07/10/2018 1.35 (H) 0.90 - 1.10 Final       Sodium Sodium   Date Value Ref Range Status   07/13/2018 135 (L) 136 - 145 mmol/L Final   07/12/2018 132 (L) 136 - 145 mmol/L Final   07/11/2018 140 136 - 145 mmol/L Final   07/10/2018 141 136 - 145 mmol/L Final   07/10/2018 139 136 - 145 mmol/L Final      Potassium Potassium   Date Value Ref Range Status   07/13/2018 3.8 3.5 - 5.2 mmol/L Final   07/12/2018 3.7 3.5 - 5.2 mmol/L Final   07/11/2018 4.1 3.5 - 5.2 mmol/L Final   07/10/2018 4.5 3.5 - 5.2 mmol/L Final   07/10/2018 3.8 3.5 - 5.2 mmol/L Final   07/10/2018 3.9 3.5 - 5.2 mmol/L Final      Chloride Chloride   Date Value Ref Range Status   07/13/2018 99 98 - 107 mmol/L Final   07/12/2018 94 (L) 98 - 107 mmol/L Final   07/11/2018 103 98 - 107 mmol/L Final   07/10/2018 103 98 - 107 mmol/L Final   07/10/2018 102 98 - 107 mmol/L Final      Bicarbonate CO2   Date Value Ref Range Status   07/13/2018 24.5 22.0 - 29.0 mmol/L Final   07/12/2018 25.6 22.0 - 29.0 mmol/L Final   07/11/2018 24.9 22.0 - 29.0 mmol/L Final   07/10/2018 23.5 22.0 - 29.0 mmol/L Final   07/10/2018 21.9 (L) 22.0 - 29.0 mmol/L Final      BUN BUN   Date Value Ref Range Status   07/13/2018 15 8 - 23 mg/dL Final   07/12/2018 12 8 - 23 mg/dL Final   07/11/2018 12 8 - 23 mg/dL Final   07/10/2018 12 8 - 23 mg/dL Final   07/10/2018 13 8 - 23 mg/dL Final      Creatinine Creatinine   Date Value Ref Range Status   07/13/2018 0.88 0.76 - 1.27 mg/dL Final   07/12/2018 1.01 0.76 - 1.27 mg/dL Final   07/11/2018 0.98 0.76 - 1.27 mg/dL Final   07/10/2018 1.08 0.76 - 1.27 mg/dL Final   07/10/2018 1.21 0.76 - 1.27 mg/dL Final      Calcium Calcium   Date Value Ref Range Status   07/13/2018 9.1 8.6 - 10.5 mg/dL Final   07/12/2018 8.6 8.6 - 10.5 mg/dL Final   07/11/2018 8.2 (L) 8.6 - 10.5 mg/dL Final   07/10/2018 8.5 (L) 8.6 - 10.5 mg/dL Final    07/10/2018 8.8 8.6 - 10.5 mg/dL Final      Magnesium Magnesium   Date Value Ref Range Status   07/11/2018 2.5 (H) 1.6 - 2.4 mg/dL Final   07/10/2018 2.7 (H) 1.6 - 2.4 mg/dL Final   07/10/2018 2.5 (H) 1.6 - 2.4 mg/dL Final   07/10/2018 3.0 (H) 1.6 - 2.4 mg/dL Final            amiodarone 400 mg Oral Q12H   aspirin 81 mg Oral Daily   atorvastatin 40 mg Oral Nightly   escitalopram 10 mg Oral Daily   furosemide 40 mg Oral Daily   insulin aspart 0-7 Units Subcutaneous 4x Daily With Meals & Nightly   metoprolol tartrate 50 mg Oral Q12H   mupirocin  Each Nare BID   pantoprazole 40 mg Oral Daily   potassium chloride 20 mEq Oral Daily   sennosides-docusate sodium 2 tablet Oral Nightly       insulin regular infusion 1 unit/mL (CCU use) 0-50 Units/hr Last Rate: Stopped (07/11/18 0705)   sodium chloride 30 mL/hr Last Rate: 30 mL/hr (07/10/18 1119)   sodium chloride 30 mL/hr Last Rate: Stopped (07/11/18 0705)           Patient Active Problem List   Diagnosis Code   • Benign essential hypertension I10   • Hemolytic anemia (CMS/[facility]) D58.9   • Hyperlipidemia E78.5   • Migraine G43.909   • Situational depression F43.21   • Aortic valve calcification I35.9   • Aortic stenosis due to bicuspid aortic valve Q23.0, Q23.1   • Obesity, morbid (CMS/[facility]) E66.01   • Nonrheumatic aortic valve stenosis I35.0       Assessment & Plan       -Severe aortic stenosis- s/p AVR- tissue, CABGx1 vein graft to diagonal LAD--POD#3[place]  -Single vessel CAD  -LV hypertrophy, preserved function EF 65%  -Hypertension   -Depression- on lexapro  -Migraines  -Hyperlipidemia- on statin  -Post op anemia, expected ABL  -leukocytosis - probably reactive     On room air  SR rate 72        Discontinue remaining tube and AV wires.  Mobilize.   Continue routine care. Anticipate discharge in AM.    MITCHELL Solis  07/13/18  10:21 AM

## 2018-07-13 NOTE — THERAPY DISCHARGE NOTE
Acute Care - Physical Therapy Treatment Note/Discharge  [facility]     Patient Name: Sachin Tolliver  : 1955  MRN: [id number]  Today's Date: 2018  Onset of Illness/Injury or Date of Surgery: 18  Date of Referral to PT: 18  Referring Physician: Flash    Admit Date: 2018    Visit Dx:    ICD-10-CM ICD-9-CM   1. Aortic stenosis due to bicuspid aortic valve Q23.0 746.3    Q23.1 746.4   2. Nonrheumatic aortic valve stenosis I35.0 424.1     Patient Active Problem List   Diagnosis   • Benign essential hypertension   • Hemolytic anemia (CMS/HCC)   • Hyperlipidemia   • Migraine   • Situational depression   • Aortic valve calcification   • Aortic stenosis due to bicuspid aortic valve   • Obesity, morbid (CMS/HCC)   • Nonrheumatic aortic valve stenosis       Physical Therapy Education     Title: PT OT SLP Therapies (Resolved)     Topic: Physical Therapy (Resolved)     Point: Precautions (Resolved)    Learning Progress Summary     Learner Status Readiness Method Response Comment Documented by    Patient Done Acceptance E ERICK READ 18 0902     Done Acceptance E ERICK READ 18 0905     Done Acceptance E ERICK READ 18 0853                      User Key     Initials Effective Dates Name Provider Type William READ 18 -  Patti Bates, PT Physical Therapist PT                    PT Rehab Goals     Row Name 18 0900             Problem Specific Goal 1 (PT)    Progress/Outcome (Problem Specific Goal 1, PT) goal met  -MD        User Key  (r) = Recorded By, (t) = Taken By, (c) = Cosigned By    Initials Name Provider Type Discipline    MD Patti Bates, PT Physical Therapist PT        Therapy Treatment        Rehabilitation Treatment Summary     Row Name 18 0859             Treatment Time/Intention    Discipline physical therapist  -MD      Document Type discharge evaluation/summary  -MD      Subjective Information no complaints  -MD      Mode of Treatment physical therapy  -MD       Patient/Family Observations Pt seated in chair showing no signs of acute distress.  -MD      Therapy Frequency (PT Clinical Impression) daily  -MD      Patient Effort good  -MD      Existing Precautions/Restrictions cardiac;sternal  -MD      Recorded by [MD] Patti Bates, PT 07/13/18 0901      Row Name 07/13/18 0859             Cognitive Assessment/Intervention- PT/OT    Orientation Status (Cognition) oriented x 3  -MD      Follows Commands (Cognition) WNL  -MD      Recorded by [MD] Patti Bates, PT 07/13/18 0901      Row Name 07/13/18 0859             Sit-Stand Transfer    Sit-Stand Allen (Transfers) independent  -MD      Recorded by [MD] Patti Bates, PT 07/13/18 0901      Row Name 07/13/18 0859             Stand-Sit Transfer    Stand-Sit Allen (Transfers) independent  -MD      Recorded by [MD] Patti Bates, PT 07/13/18 0901      Row Name 07/13/18 0859             Gait/Stairs Assessment/Training    Allen Level (Gait) independent  -MD      Distance in Feet (Gait) 240  -MD      Allen Level (Stairs) supervision  -MD      Handrail Location (Stairs) both sides  -MD      Number of Steps (Stairs) 8  -MD      Recorded by [MD] Patti Bates, PT 07/13/18 0901      Row Name 07/13/18 0859             Positioning and Restraints    Pre-Treatment Position sitting in chair/recliner  -MD      Post Treatment Position chair  -MD      In Chair reclined;sitting;call light within reach  -MD      Recorded by [MD] Patti Bates, PT 07/13/18 0901      Row Name 07/13/18 0859             Pain Scale: Numbers Pre/Post-Treatment    Pain Scale: Numbers, Pretreatment 0/10 - no pain  -MD      Recorded by [MD] Patti Bates, PT 07/13/18 0901      Row Name                Wound 07/10/18 0943 Other (See comments) chest incision    Wound - Properties Group Date first assessed: 07/10/18 [OD] Time first assessed: 0943 [OD] Side: Other (See comments) [OD] Location: chest [OD] Type: incision [OD] Recorded by:  [OD] Ilya Uriostegui Jr., RN 07/10/18 0943     Row Name                Wound 07/10/18 0943 Left leg incision    Wound - Properties Group Date first assessed: 07/10/18 [OD] Time first assessed: 0943 [OD] Side: Left [OD] Location: leg [OD] Type: incision [OD] Recorded by:  [OD] Ilya Uriostegui Jr., RN 07/10/18 0943      User Key  (r) = Recorded By, (t) = Taken By, (c) = Cosigned By    Initials Name Effective Dates Discipline    OD Ilya Uriostegui Jr., RN 06/16/16 -  Nurse    MD Patti Bates, PT 04/03/18 -  PT        Wound 07/10/18 0943 Other (See comments) chest incision (Active)   Dressing Appearance dry;intact 7/13/2018  8:15 AM   Closure ABUNDIO 7/13/2018  8:15 AM   Base dressing in place, unable to visualize 7/13/2018  8:15 AM   Drainage Amount none 7/13/2018  8:15 AM       Wound 07/10/18 0943 Left leg incision (Active)   Dressing Appearance open to air 7/13/2018  8:15 AM   Closure Liquid skin adhesive 7/13/2018  8:15 AM   Drainage Amount none 7/13/2018  8:15 AM       PT Recommendation and Plan  Anticipated Discharge Disposition (PT): home with home health  Planned Therapy Interventions (PT Eval): bed mobility training, gait training, balance training, strengthening, transfer training  Therapy Frequency (PT Clinical Impression): daily  Outcome Summary/Treatment Plan (PT)  Anticipated Discharge Disposition (PT): home with home health  Plan of Care Reviewed With: patient  Progress: improving  Outcome Summary: Pt has met PT goals and is safe to ambulate w nsg/family.  Pt was able to go up and down steps with no assistance from PT.  PT will sign off at this time.          Outcome Measures     Row Name 07/13/18 0900 07/12/18 0900 07/11/18 0800       How much help from another person do you currently need...    Turning from your back to your side while in flat bed without using bedrails? 4  -MD 3  -MD 2  -MD    Moving from lying on back to sitting on the side of a flat bed without bedrails? 4  -MD 3  -MD 2  -MD    Moving to and from a bed to a chair (including a  wheelchair)? 4  -MD 3  -MD 3  -MD    Standing up from a chair using your arms (e.g., wheelchair, bedside chair)? 4  -MD 3  -MD 3  -MD    Climbing 3-5 steps with a railing? 3  -MD 3  -MD 2  -MD    To walk in hospital room? 4  -MD 3  -MD 3  -MD    AM-PAC 6 Clicks Score 23  -MD 18  -MD 15  -MD       Functional Assessment    Outcome Measure Options AM-PAC 6 Clicks Basic Mobility (PT)  -MD AM-PAC 6 Clicks Basic Mobility (PT)  -MD AM-PAC 6 Clicks Basic Mobility (PT)  -MD      User Key  (r) = Recorded By, (t) = Taken By, (c) = Cosigned By    Initials Name Provider Type    MD Patti Bates, PT Physical Therapist           Time Calculation:         PT Charges     Row Name 07/13/18 0856             Time Calculation    Start Time 0834  -MD      Stop Time 0846  -MD      Time Calculation (min) 12 min  -MD      PT Received On 07/13/18  -MD        User Key  (r) = Recorded By, (t) = Taken By, (c) = Cosigned By    Initials Name Provider Type    MD Patti Batse, PT Physical Therapist        Therapy Suggested Charges     Code   Minutes Charges    None             Therapy Charges for Today     Code Description Service Date Service Provider Modifiers Qty    16255143916 HC PT THER PROC EA 15 MIN 7/12/2018 Patti Bates, PT GP 1    69741478238 HC PT THER PROC EA 15 MIN 7/13/2018 Patti Bates PT GP 1          PT G-Codes  Outcome Measure Options: AM-PAC 6 Clicks Basic Mobility (PT)    PT Discharge Summary  Anticipated Discharge Disposition (PT): home with home health  Reason for Discharge: All goals achieved  Outcomes Achieved: Able to achieve all goals within established timeline  Discharge Destination: Home with home health    Patti Bates PT  7/13/2018

## 2018-07-13 NOTE — PLAN OF CARE
Problem: Patient Care Overview  Goal: Plan of Care Review  Outcome: Ongoing (interventions implemented as appropriate)   07/13/18 6447   Coping/Psychosocial   Plan of Care Reviewed With patient   Plan of Care Review   Progress improving   OTHER   Outcome Summary AV wires and remaining chest tube removed. VSS throughout shift. to have overnight oxemitry this tonight. possible d/c in am. will continue to monitor.      Goal: Individualization and Mutuality  Outcome: Ongoing (interventions implemented as appropriate)      Problem: Skin Injury Risk (Adult)  Goal: Skin Health and Integrity  Outcome: Ongoing (interventions implemented as appropriate)      Problem: Cardiac Surgery (Adult)  Goal: Signs and Symptoms of Listed Potential Problems Will be Absent, Minimized or Managed (Cardiac Surgery)  Outcome: Ongoing (interventions implemented as appropriate)    Goal: Anesthesia/Sedation Recovery  Outcome: Ongoing (interventions implemented as appropriate)      Problem: Fall Risk (Adult)  Goal: Identify Related Risk Factors and Signs and Symptoms  Outcome: Ongoing (interventions implemented as appropriate)    Goal: Absence of Fall  Outcome: Ongoing (interventions implemented as appropriate)

## 2018-07-13 NOTE — PLAN OF CARE
Problem: Patient Care Overview  Goal: Plan of Care Review   07/13/18 0904   Coping/Psychosocial   Plan of Care Reviewed With patient   OTHER   Outcome Summary Pt has met PT goals and is safe to ambulate w nsg/family. Pt was able to go up and down steps with no assistance from PT. PT will sign off at this time.

## 2018-07-14 VITALS
TEMPERATURE: 98.2 F | OXYGEN SATURATION: 94 % | HEART RATE: 73 BPM | DIASTOLIC BLOOD PRESSURE: 68 MMHG | BODY MASS INDEX: 32.37 KG/M2 | HEIGHT: 74 IN | WEIGHT: 252.2 LBS | SYSTOLIC BLOOD PRESSURE: 103 MMHG | RESPIRATION RATE: 16 BRPM

## 2018-07-14 LAB
ANION GAP SERPL CALCULATED.3IONS-SCNC: 13.7 MMOL/L
BUN BLD-MCNC: 16 MG/DL (ref 8–23)
BUN/CREAT SERPL: 15.2 (ref 7–25)
CALCIUM SPEC-SCNC: 9.1 MG/DL (ref 8.6–10.5)
CHLORIDE SERPL-SCNC: 99 MMOL/L (ref 98–107)
CO2 SERPL-SCNC: 25.3 MMOL/L (ref 22–29)
CREAT BLD-MCNC: 1.05 MG/DL (ref 0.76–1.27)
DEPRECATED RDW RBC AUTO: 45.3 FL (ref 37–54)
ERYTHROCYTE [DISTWIDTH] IN BLOOD BY AUTOMATED COUNT: 13.6 % (ref 11.5–14.5)
GFR SERPL CREATININE-BSD FRML MDRD: 72 ML/MIN/1.73
GLUCOSE BLD-MCNC: 106 MG/DL (ref 65–99)
HCT VFR BLD AUTO: 34.8 % (ref 40.4–52.2)
HGB BLD-MCNC: 11.3 G/DL (ref 13.7–17.6)
MCH RBC QN AUTO: 29.9 PG (ref 27–32.7)
MCHC RBC AUTO-ENTMCNC: 32.5 G/DL (ref 32.6–36.4)
MCV RBC AUTO: 92.1 FL (ref 79.8–96.2)
PLATELET # BLD AUTO: 197 10*3/MM3 (ref 140–500)
PMV BLD AUTO: 11.6 FL (ref 6–12)
POTASSIUM BLD-SCNC: 3.5 MMOL/L (ref 3.5–5.2)
RBC # BLD AUTO: 3.78 10*6/MM3 (ref 4.6–6)
SODIUM BLD-SCNC: 138 MMOL/L (ref 136–145)
WBC NRBC COR # BLD: 9.72 10*3/MM3 (ref 4.5–10.7)

## 2018-07-14 PROCEDURE — 99238 HOSP IP/OBS DSCHRG MGMT 30/<: CPT | Performed by: NURSE PRACTITIONER

## 2018-07-14 PROCEDURE — 94799 UNLISTED PULMONARY SVC/PX: CPT | Performed by: NURSE PRACTITIONER

## 2018-07-14 PROCEDURE — 85027 COMPLETE CBC AUTOMATED: CPT | Performed by: THORACIC SURGERY (CARDIOTHORACIC VASCULAR SURGERY)

## 2018-07-14 PROCEDURE — 80048 BASIC METABOLIC PNL TOTAL CA: CPT | Performed by: THORACIC SURGERY (CARDIOTHORACIC VASCULAR SURGERY)

## 2018-07-14 RX ORDER — FUROSEMIDE 40 MG/1
40 TABLET ORAL DAILY
Qty: 30 TABLET | Refills: 1 | Status: SHIPPED | OUTPATIENT
Start: 2018-07-15 | End: 2018-08-31

## 2018-07-14 RX ORDER — POTASSIUM CHLORIDE 750 MG/1
20 CAPSULE, EXTENDED RELEASE ORAL DAILY
Qty: 60 CAPSULE | Refills: 1 | Status: SHIPPED | OUTPATIENT
Start: 2018-07-15 | End: 2018-08-31

## 2018-07-14 RX ORDER — ATORVASTATIN CALCIUM 40 MG/1
40 TABLET, FILM COATED ORAL NIGHTLY
Qty: 30 TABLET | Refills: 10 | Status: SHIPPED | OUTPATIENT
Start: 2018-07-14 | End: 2018-09-20

## 2018-07-14 RX ORDER — METOPROLOL TARTRATE 50 MG/1
50 TABLET, FILM COATED ORAL EVERY 12 HOURS SCHEDULED
Qty: 60 TABLET | Refills: 10 | Status: SHIPPED | OUTPATIENT
Start: 2018-07-14 | End: 2019-07-11 | Stop reason: SDUPTHER

## 2018-07-14 RX ADMIN — AMIODARONE HYDROCHLORIDE 400 MG: 200 TABLET ORAL at 08:55

## 2018-07-14 RX ADMIN — POTASSIUM CHLORIDE 20 MEQ: 750 CAPSULE, EXTENDED RELEASE ORAL at 08:54

## 2018-07-14 RX ADMIN — ASPIRIN 81 MG: 81 TABLET, DELAYED RELEASE ORAL at 08:55

## 2018-07-14 RX ADMIN — MUPIROCIN 10 APPLICATION: 20 OINTMENT TOPICAL at 08:55

## 2018-07-14 RX ADMIN — POTASSIUM CHLORIDE 40 MEQ: 750 CAPSULE, EXTENDED RELEASE ORAL at 07:13

## 2018-07-14 RX ADMIN — PANTOPRAZOLE SODIUM 40 MG: 40 TABLET, DELAYED RELEASE ORAL at 08:55

## 2018-07-14 RX ADMIN — METOPROLOL TARTRATE 50 MG: 25 TABLET ORAL at 08:54

## 2018-07-14 RX ADMIN — FUROSEMIDE 40 MG: 40 TABLET ORAL at 08:54

## 2018-07-14 NOTE — PROGRESS NOTES
-Severe aortic stenosis- s/p AVR- tissue, CABGx1 vein graft to diagonal LAD--POD#4 Flash  -Single vessel CAD  -LV hypertrophy, preserved function EF 65%  -Hypertension   -Depression- on lexapro  -Migraines  -Hyperlipidemia- on statin  -Post op anemia, expected ABL  -leukocytosis - probably reactive       Okay from our standpoint for discharge home with home health.  He will need to follow up with Dr. Vidales in 4-6 weeks.  Sternal precautions reviewed, s/s of sternal infection/instability.     Irais Alas, APRN  7/14/2018  8:31 AM

## 2018-07-14 NOTE — PROGRESS NOTES
Continued Stay Note  [facility]     Patient Name: Sachin Tolliver  MRN: [id number]  Today's Date: 7/14/2018    Admit Date: 7/9/2018          Discharge Plan     Row Name 07/14/18 1130       Plan    Plan Comments Spoke with nurse, Brenda, who states that patient will be dc'd.  Call placed to [facility] and she will follow.               Discharge Codes    No documentation.       Expected Discharge Date and Time     Expected Discharge Date Expected Discharge Time    Jul 14, 2018             Elisabeth Nation RN

## 2018-07-14 NOTE — DISCHARGE SUMMARY
Hospital Discharge    Patient Name: Sachin Tolliver  Age/Sex: 62 y.o. male  : 1955  MRN: [id number]    Encounter Provider: MITCHELL Thomas  Referring Provider: Alexandria Ashraf MD  Place of Service: [facility] CARDIOLOGY  Patient Care Team:  Milvia Briceno MD as PCP - General         Date of Discharge:  2018   Date of Admit: 2018    Discharge Condition: Stable  Discharge Diagnosis:  Principal Problem:    Nonrheumatic aortic valve stenosis  Active Problems:    Aortic stenosis due to bicuspid aortic valve      Hospital Course:   Sachin Tolliver is a 62 y.o. male with a history of bicuspid aortic valve. This was followed clinically and in 2018 he was noted to have enlargement of his LV cavity. He was evaluated by CT surgery. On  he underwent diagnostic cardiac catheterization and single vessel disease was noted. He was admitted and on 7/10 underwent aortic valve replacement with 29 mm Medtronic mosaic ultra porcine valve and CABG X 1 with vein graft to diagonal branch of the LAD. His post op course was essentially unremarkable. He was on amiodarone for AF prophylaxis and no arrhythmias noted. He is stable for discharge. He was on losartan/HCTZ prior to admission and he has not been taking it here. Will stop it and reassess BP as an outpatient. He will follow up with MITCHELL Jules in one week and Dr. Irene in one month.     Objective:  Temp:  [98.1 °F (36.7 °C)-98.7 °F (37.1 °C)] 98.1 °F (36.7 °C)  Heart Rate:  [68-78] 78  Resp:  [16-18] 18  BP: (109-137)/(69-79) 137/79    Intake/Output Summary (Last 24 hours) at 18 1136  Last data filed at 18 0707   Gross per 24 hour   Intake             1080 ml   Output             1790 ml   Net             -710 ml     Body mass index is 32.38 kg/m².  1    07/10/18  0500 18  0300 18  0348   Weight: 115 kg (253 lb 14.4 oz) 117 kg (259 lb) 114 kg (252 lb 3.2 oz)     Weight change:     Physical  Exam:  Constitutional: He is oriented to person, place, and time. He appears well-developed. He does not appear ill.   HENT:   Eyes: Lids are normal. Right eye exhibits no exudate. Left eye exhibits no exudate.  Neck: No JVD present. Carotid bruit is not present. No tracheal deviation present. No thyroid mass and no thyromegaly present.   Cardiovascular: Normal rate, regular rhythm and normal heart sounds.    Pulses:       Posterior tibial pulses are 2+ on the right side, and 2+ on the left side.   Pulmonary/Chest: Effort normal and breath sounds normal. midsternal incision well approximated.   Abdominal: Soft. Normal appearance and bowel sounds are normal. There is no tenderness.   Musculoskeletal: Normal range of motion.        Right shoulder: He exhibits no deformity.        Left shoulder: He exhibits no deformity.   Neurological: He is alert and oriented to person, place, and time. He has normal strength.   Skin: Skin is warm, dry and intact. No rash noted.   Psychiatric: He has a normal mood and affect. His behavior is normal. Thought content normal.   Vitals reviewed      Procedures Performed  Procedure(s):  INTROP MARIAA; AORTIC VALVE REPLACEMENT; CORONARY ARTERY BYPASS X1 UTILIZING THE ENDOSCOPICALLY HARVESTED LEFT GREATER SAPHENOUS VEIN; PRP    Procedure: Aortic valve replacement with a 29 mm Medtronic Mosaic ultra porcine valve.  CABG ×1 with vein graft to diagonal branch the LAD.  Temporary cardiopulmonary bypass.  Antegrade and retrograde with direct coronary cardioplegia.  Neurologic monitoring.  Transesophageal echo.  Endoscopic vein harvest of the left greater saphenous vein.     Surgeon: Hudson Vidales MD     Assisistant: Lindsay Ibarra CSA     Anesthesia: GET     Findings : There is a typical bicuspid aortic valve with heavy calcification.  In this instance the left cornea sinus was diminutive.  The coronary arteries were 180° apart.  The diagonal branch was 2.5 mm.  The ventricle was a large grade  6 and hypertrophied grade 5.  The postoperative echo was satisfactory with a well-seated aortic valve and no paravalvular leak.  The mitral valve satisfactory.  LV function was 60%.         Cardiac cath 7/9/18  FINDINGS:     RIGHT HEART HEMODYNAMICS:    1.  Pulmonary wedge pressure: 20/18, 12  2.  Pulmonary artery pressure: 33/7, 18  3.  Right ventricular pressure: 34 over 4, 4  4.  Right atrial pressure: 10/5, 4  Pulmonary artery saturation: 69% on room air  6.  Right radial artery saturation: 95% on room air  7.  Jose Cruz calculated cardiac output 6.16 L/m, cardiac index 2.54 L/m/m².     CORONARY ANGIOGRAPHY:   1.  Left main: 0% stenosis.  The vessel bifurcates into a left anterior descending and left circumflex arteries.  2.  Left anterior descending artery: 20% proximal stenosis.  Gives rise to a large first diagonal branch with 0% stenosis.  A moderate size second diagonal branch has an 80% ostial stenosis.  The mid to distal LAD has 0% stenosis.  3.  Left circumflex artery: 0% proximal stenosis.  Gives rise to a large branching first obtuse marginal branch with 0% stenosis.  4.  Right coronary artery: 0% stenosis.  Vessel bifurcates into a large posterior descending and posterior lateral branches both with 0% stenosis.  This is a right dominant system.           POST-PROCEDURE DIAGNOSIS:   1. Aortic stenosis  2. Normal pulmonary pressures  3. Single small vessel coronary artery disease     RECOMMENDATIONS:   Admit for surgical AVR.    Consults:  Consults     No orders found from 6/10/2018 to 7/10/2018.          Pertinent Test Results:    Results from last 7 days  Lab Units 07/14/18  0322 07/13/18  0339 07/12/18  0313 07/11/18  0253 07/10/18  1843 07/10/18  1446 07/10/18  1038 07/10/18  0405 07/09/18  1433   SODIUM mmol/L 138 135* 132* 140  --  141 139 138 139   POTASSIUM mmol/L 3.5 3.8 3.7 4.1 4.5 3.8 3.9 3.8 3.8   CHLORIDE mmol/L 99 99 94* 103  --  103 102 101 104   CO2 mmol/L 25.3 24.5 25.6 24.9  --  23.5 21.9*  23.4 23.8   BUN mg/dL 16 15 12 12  --  12 13 15 16   CREATININE mg/dL 1.05 0.88 1.01 0.98  --  1.08 1.21 1.09 1.11   GLUCOSE mg/dL 106* 106* 109* 126*  --  141* 125* 93 99   CALCIUM mg/dL 9.1 9.1 8.6 8.2*  --  8.5* 8.8 9.0 9.1   AST (SGOT) U/L  --   --   --   --   --   --   --   --  16   ALT (SGPT) U/L  --   --   --   --   --   --   --   --  16           Results from last 7 days  Lab Units 07/14/18  0322 07/13/18  0339 07/12/18  0313 07/11/18  0253 07/10/18  1446 07/10/18  1038 07/10/18  0938  07/10/18  0405   WBC 10*3/mm3 9.72 11.88* 14.29* 10.16 10.92* 12.02*  --   --  7.86   HEMOGLOBIN g/dL 11.3* 10.9* 11.3* 10.8* 11.0* 11.9*  --   --  13.8   HEMOGLOBIN, POC g/dL  --   --   --   --   --   --  10.2*  < >  --    HEMATOCRIT % 34.8* 34.0* 34.7* 32.5* 32.6* 34.4*  --   --  42.6   HEMATOCRIT POC %  --   --   --   --   --   --  30*  < >  --    PLATELETS 10*3/mm3 197 147 129* 120* 123* 170  --   --  210   < > = values in this interval not displayed.    Results from last 7 days  Lab Units 07/11/18  0253 07/10/18  1038 07/09/18  1433   INR  1.28* 1.35* 1.04   APTT seconds  --  32.5 26.6       Results from last 7 days  Lab Units 07/11/18  0253 07/10/18  1843 07/10/18  1446 07/10/18  1038 07/09/18  1433   MAGNESIUM mg/dL 2.5* 2.7* 2.5* 3.0* 2.3       Results from last 7 days  Lab Units 07/09/18  1433   CHOLESTEROL mg/dL 174   TRIGLYCERIDES mg/dL 178*   HDL CHOL mg/dL 36*       Results from last 7 days  Lab Units 07/09/18  1433   PROBNP pg/mL 225.9           Discharge Medications     Discharge Medications      New Medications      Instructions Start Date   atorvastatin 40 MG tablet  Commonly known as:  LIPITOR  Notes to patient:  Next dose due this evening   40 mg, Oral, Nightly      furosemide 40 MG tablet  Commonly known as:  LASIX  Notes to patient:  Next dose due tomorrow morning   40 mg, Oral, Daily   Start Date:  7/15/2018     HYDROcodone-acetaminophen 5-325 MG per tablet  Commonly known as:  NORCO  Notes to patient:   Next dose due anytime as needed for pain   2 tablets, Oral, Every 4 Hours PRN      metoprolol tartrate 50 MG tablet  Commonly known as:  LOPRESSOR  Notes to patient:  Next dose due tonight at 9pm   50 mg, Oral, Every 12 Hours Scheduled      potassium chloride 10 MEQ CR capsule  Commonly known as:  MICRO-K  Notes to patient:  Next dose due tomorrow morning   20 mEq, Oral, Daily   Start Date:  7/15/2018        Continue These Medications      Instructions Start Date   aspirin 81 MG tablet  Notes to patient:  Next dose due tomorrow morning   Oral      butalbital-acetaminophen-caffeine -40 MG per tablet  Commonly known as:  FIORICET, ESGIC  Notes to patient:  Next dose due anytime as needed   1 tablet, Oral, Every 6 Hours PRN      escitalopram 10 MG tablet  Commonly known as:  LEXAPRO  Notes to patient:  Next dose due tomorrow morning   TAKE 1 TABLET BY MOUTH DAILY      GLUCOSAMINE CHONDR 1500 COMPLX PO  Notes to patient:  Next dose due tomorrow morning   Oral      Melatonin 5 MG capsule  Notes to patient:  Next dose due tonight at bedtime   Oral, Nightly PRN      MULTIVITAMINS PO  Notes to patient:  Next dose due tomorrow morning   Oral      PROBIOTIC PO  Notes to patient:  Next dose due tomorrow morning   Oral         Stop These Medications    simvastatin 10 MG tablet  Commonly known as:  ZOCOR     valsartan-hydrochlorothiazide 80-12.5 MG per tablet  Commonly known as:  DIOVAN-HCT            Discharge Diet:    Dietary Orders     Start     Ordered    07/12/18 0704  Diet Regular; Cardiac  Diet Effective Now     Question Answer Comment   Diet Texture / Consistency Regular    Common Modifiers Cardiac        07/12/18 0703          Activity at Discharge:    Activity Instructions     Discharge Activity Restrictions       1) No driving for 2 weeks and no longer taking narcotics.    Additional Activity Instructions:      Continue to use your incentive spirometer for an additional 2 weeks.   Continue to wear your WOLFGANG hose  for an additional 2 weeks. You may remove them at night.   Walk 10 minutes at a time at least 3 times a day.   Do not drive for 2 weeks and no longer taking narcotics.   Do not lift, push or pull greater than 10 pounds for 6 weeks.   You may shower, but do not submerge your incisions until your surgeon approves (i.e., no baths, pools, hot tubs, etc.).   Clean your incision daily in the shower with Dial or Ivory soap. Do not put any additional lotions, creams, or any other substance on your incision without your surgeon's approval.                       Discharge disposition: home     Discharge Instructions and Follow ups:  No future appointments.  Additional Instructions for the Follow-ups that You Need to Schedule     Ambulatory Referral to Home Health    As directed      Face to Face Visit Date:  7/14/2018    Follow-up Provider for Plan of Care?:  I treated the patient in an acute care facility and will not continue treatment after discharge.    Follow-up Provider:  EDIN IRENE [[id number]]    Reason/Clinical Findings:  post op CABG, valve replacement    Describe mobility limitations that make leaving home difficult:  post op weakness    Nursing/Therapeutic Services Requested:  Physical Therapy Skilled Nursing    Skilled nursing orders:  Post CABG care    PT orders:  Strengthening    Frequency:  1 Week 1         Discharge Follow-up with Specified Provider: Dr. Irene; 1 Month    As directed      To:  Dr. Irene    Follow Up:  1 Month         Discharge Follow-up with Specified Provider: MITCHELL Jules; 1 Week    As directed      To:  MITCHELL Jules    Follow Up:  1 Week            Contact information for follow-up providers     Milvia Briceno MD Follow up.    Specialty:  Family Medicine  Contact information:  [address]  [facility] [address] [phone]             [facility]    Specialty:  Home Health Services  Contact  information:  [address] [address]           Иван Irene MD Follow up in 1 month(s).    Specialty:  Cardiology  Contact information:  [address]  [facility] [address]  [place] [id number] [phone]                   Contact information for after-discharge care     Home Medical Care     [facility] Follow up.    Specialty:  Home Health Services  Contact information:  [address] [address] [phone]                             Test Results Pending at Discharge: none     Gladys Burgos, APRN  07/14/18  11:36 AM

## 2018-07-14 NOTE — DISCHARGE INSTR - ACTIVITY
Continue to use your incentive spirometer for an additional 2 weeks.   Continue to wear your WOLFGANG hose for an additional 2 weeks. You may remove them at night.   Walk 10 minutes at a time at least 3 times a day.   Do not drive for 2 weeks and no longer taking narcotics.   Do not lift, push or pull greater than 10 pounds for 6 weeks.   You may shower, but do not submerge your incisions until your surgeon approves (i.e., no baths, pools, hot tubs, etc.).   Clean your incision daily in the shower with Dial or Ivory soap. Do not put any additional lotions, creams, or any other substance on your incision without your surgeon's approval.

## 2018-07-16 NOTE — PAYOR COMM NOTE
"Vani Morley (62 y.o. Male)              ATTENTION;   DC SUMMARY CASE REF [id number] FOR YOUR REVIEW,       Date of Birth Social Security Number Address Home Phone MRN    1955  [address] [address] [phone] [id number]    Mandaen Marital Status          Latter-day        Admission Date Admission Type Admitting Provider Attending Provider Department, Room/Bed    18 Elective Alexandria Ashraf MD  [facility] CARDIOVASC UNIT,     Discharge Date Discharge Disposition Discharge Destination        2018 Home or Self Care              Attending Provider:  (none)   Allergies:  No Known Allergies    Isolation:  None   Infection:  None   Code Status:  Prior    Ht:  188 cm (74\")   Wt:  114 kg (252 lb 3.2 oz)    Admission Cmt:  None   Principal Problem:  Nonrheumatic aortic valve stenosis [I35.0] More...                 Active Insurance as of 2018     Primary Coverage     Payor Plan Insurance Group Employer/Plan Group    [facility] [employer] [id number]     Payor Plan Address Payor Plan Phone Number Effective From Effective To    [address] [phone] 2018     [facility] [id number]       Subscriber Name Subscriber Birth Date Member ID       VANI MORLEY 1955 [id number]                 Emergency Contacts      (Rel.) Home Phone Work Phone Mobile Phone    Yaneth Morley (Spouse) [phone] -- --               Discharge Summary      MITCHELL Whitmore at 2018 11:17 AM              Hospital Discharge    Patient Name: Vani Morley  Age/Sex: 62 y.o. male  : 1955  MRN: [id number]    Encounter Provider: MITCHELL Thomas  Referring Provider: Alexandria Ashraf MD  Place of Service: [facility] CARDIOLOGY  Patient Care Team:  Milvia Briceno MD as PCP - General         Date of Discharge:  2018   Date of Admit: 2018    Discharge Condition: " Stable  Discharge Diagnosis:  Principal Problem:    Nonrheumatic aortic valve stenosis  Active Problems:    Aortic stenosis due to bicuspid aortic valve      Hospital Course:   Sachin Tolliver is a 62 y.o. male with a history of bicuspid aortic valve. This was followed clinically and in 06/2018 he was noted to have enlargement of his LV cavity. He was evaluated by CT surgery. On 7/9 he underwent diagnostic cardiac catheterization and single vessel disease was noted. He was admitted and on 7/10 underwent aortic valve replacement with 29 mm Medtronic mosaic ultra porcine valve and CABG X 1 with vein graft to diagonal branch of the LAD. His post op course was essentially unremarkable. He was on amiodarone for AF prophylaxis and no arrhythmias noted. He is stable for discharge. He was on losartan/HCTZ prior to admission and he has not been taking it here. Will stop it and reassess BP as an outpatient. He will follow up with MITCHELL Jules in one week and Dr. Irene in one month.     Objective:  Temp:  [98.1 °F (36.7 °C)-98.7 °F (37.1 °C)] 98.1 °F (36.7 °C)  Heart Rate:  [68-78] 78  Resp:  [16-18] 18  BP: (109-137)/(69-79) 137/79    Intake/Output Summary (Last 24 hours) at 07/14/18 1136  Last data filed at 07/14/18 0707   Gross per 24 hour   Intake             1080 ml   Output             1790 ml   Net             -710 ml     Body mass index is 32.38 kg/m².  1    07/10/18  0500 07/12/18  0300 07/14/18  0348   Weight: 115 kg (253 lb 14.4 oz) 117 kg (259 lb) 114 kg (252 lb 3.2 oz)     Weight change:     Physical Exam:  Constitutional: He is oriented to person, place, and time. He appears well-developed. He does not appear ill.   HENT:   Eyes: Lids are normal. Right eye exhibits no exudate. Left eye exhibits no exudate.  Neck: No JVD present. Carotid bruit is not present. No tracheal deviation present. No thyroid mass and no thyromegaly present.   Cardiovascular: Normal rate, regular rhythm and normal heart sounds.     Pulses:       Posterior tibial pulses are 2+ on the right side, and 2+ on the left side.   Pulmonary/Chest: Effort normal and breath sounds normal. midsternal incision well approximated.   Abdominal: Soft. Normal appearance and bowel sounds are normal. There is no tenderness.   Musculoskeletal: Normal range of motion.        Right shoulder: He exhibits no deformity.        Left shoulder: He exhibits no deformity.   Neurological: He is alert and oriented to person, place, and time. He has normal strength.   Skin: Skin is warm, dry and intact. No rash noted.   Psychiatric: He has a normal mood and affect. His behavior is normal. Thought content normal.   Vitals reviewed      Procedures Performed  Procedure(s):  INTROP MARIAA; AORTIC VALVE REPLACEMENT; CORONARY ARTERY BYPASS X1 UTILIZING THE ENDOSCOPICALLY HARVESTED LEFT GREATER SAPHENOUS VEIN; PRP    Procedure: Aortic valve replacement with a 29 mm Medtronic Mosaic ultra porcine valve.  CABG ×1 with vein graft to diagonal branch the LAD.  Temporary cardiopulmonary bypass.  Antegrade and retrograde with direct coronary cardioplegia.  Neurologic monitoring.  Transesophageal echo.  Endoscopic vein harvest of the left greater saphenous vein.     Surgeon: Hudson Vidales MD     Assisistant: Lindsay Ibarra CSA     Anesthesia: GET     Findings : There is a typical bicuspid aortic valve with heavy calcification.  In this instance the left cornea sinus was diminutive.  The coronary arteries were 180° apart.  The diagonal branch was 2.5 mm.  The ventricle was a large grade 6 and hypertrophied grade 5.  The postoperative echo was satisfactory with a well-seated aortic valve and no paravalvular leak.  The mitral valve satisfactory.  LV function was 60%.         Cardiac cath 7/9/18  FINDINGS:     RIGHT HEART HEMODYNAMICS:    1.  Pulmonary wedge pressure: 20/18, 12  2.  Pulmonary artery pressure: 33/7, 18  3.  Right ventricular pressure: 34 over 4, 4  4.  Right atrial pressure:  10/5, 4  Pulmonary artery saturation: 69% on room air  6.  Right radial artery saturation: 95% on room air  7.  Jose Cruz calculated cardiac output 6.16 L/m, cardiac index 2.54 L/m/m².     CORONARY ANGIOGRAPHY:   1.  Left main: 0% stenosis.  The vessel bifurcates into a left anterior descending and left circumflex arteries.  2.  Left anterior descending artery: 20% proximal stenosis.  Gives rise to a large first diagonal branch with 0% stenosis.  A moderate size second diagonal branch has an 80% ostial stenosis.  The mid to distal LAD has 0% stenosis.  3.  Left circumflex artery: 0% proximal stenosis.  Gives rise to a large branching first obtuse marginal branch with 0% stenosis.  4.  Right coronary artery: 0% stenosis.  Vessel bifurcates into a large posterior descending and posterior lateral branches both with 0% stenosis.  This is a right dominant system.           POST-PROCEDURE DIAGNOSIS:   1. Aortic stenosis  2. Normal pulmonary pressures  3. Single small vessel coronary artery disease     RECOMMENDATIONS:   Admit for surgical AVR.    Consults:  Consults     No orders found from 6/10/2018 to 7/10/2018.          Pertinent Test Results:    Results from last 7 days  Lab Units 07/14/18  0322 07/13/18  0339 07/12/18  0313 07/11/18  0253 07/10/18  1843 07/10/18  1446 07/10/18  1038 07/10/18  0405 07/09/18  1433   SODIUM mmol/L 138 135* 132* 140  --  141 139 138 139   POTASSIUM mmol/L 3.5 3.8 3.7 4.1 4.5 3.8 3.9 3.8 3.8   CHLORIDE mmol/L 99 99 94* 103  --  103 102 101 104   CO2 mmol/L 25.3 24.5 25.6 24.9  --  23.5 21.9* 23.4 23.8   BUN mg/dL 16 15 12 12  --  12 13 15 16   CREATININE mg/dL 1.05 0.88 1.01 0.98  --  1.08 1.21 1.09 1.11   GLUCOSE mg/dL 106* 106* 109* 126*  --  141* 125* 93 99   CALCIUM mg/dL 9.1 9.1 8.6 8.2*  --  8.5* 8.8 9.0 9.1   AST (SGOT) U/L  --   --   --   --   --   --   --   --  16   ALT (SGPT) U/L  --   --   --   --   --   --   --   --  16           Results from last 7 days  Lab Units 07/14/18  0901  07/13/18  0339 07/12/18  0313 07/11/18  0253 07/10/18  1446 07/10/18  1038 07/10/18  0938  07/10/18  0405   WBC 10*3/mm3 9.72 11.88* 14.29* 10.16 10.92* 12.02*  --   --  7.86   HEMOGLOBIN g/dL 11.3* 10.9* 11.3* 10.8* 11.0* 11.9*  --   --  13.8   HEMOGLOBIN, POC g/dL  --   --   --   --   --   --  10.2*  < >  --    HEMATOCRIT % 34.8* 34.0* 34.7* 32.5* 32.6* 34.4*  --   --  42.6   HEMATOCRIT POC %  --   --   --   --   --   --  30*  < >  --    PLATELETS 10*3/mm3 197 147 129* 120* 123* 170  --   --  210   < > = values in this interval not displayed.    Results from last 7 days  Lab Units 07/11/18  0253 07/10/18  1038 07/09/18  1433   INR  1.28* 1.35* 1.04   APTT seconds  --  32.5 26.6       Results from last 7 days  Lab Units 07/11/18  0253 07/10/18  1843 07/10/18  1446 07/10/18  1038 07/09/18  1433   MAGNESIUM mg/dL 2.5* 2.7* 2.5* 3.0* 2.3       Results from last 7 days  Lab Units 07/09/18  1433   CHOLESTEROL mg/dL 174   TRIGLYCERIDES mg/dL 178*   HDL CHOL mg/dL 36*       Results from last 7 days  Lab Units 07/09/18  1433   PROBNP pg/mL 225.9           Discharge Medications     Discharge Medications      New Medications      Instructions Start Date   atorvastatin 40 MG tablet  Commonly known as:  LIPITOR  Notes to patient:  Next dose due this evening   40 mg, Oral, Nightly      furosemide 40 MG tablet  Commonly known as:  LASIX  Notes to patient:  Next dose due tomorrow morning   40 mg, Oral, Daily   Start Date:  7/15/2018     HYDROcodone-acetaminophen 5-325 MG per tablet  Commonly known as:  NORCO  Notes to patient:  Next dose due anytime as needed for pain   2 tablets, Oral, Every 4 Hours PRN      metoprolol tartrate 50 MG tablet  Commonly known as:  LOPRESSOR  Notes to patient:  Next dose due tonight at 9pm   50 mg, Oral, Every 12 Hours Scheduled      potassium chloride 10 MEQ CR capsule  Commonly known as:  MICRO-K  Notes to patient:  Next dose due tomorrow morning   20 mEq, Oral, Daily   Start Date:  7/15/2018         Continue These Medications      Instructions Start Date   aspirin 81 MG tablet  Notes to patient:  Next dose due tomorrow morning   Oral      butalbital-acetaminophen-caffeine -40 MG per tablet  Commonly known as:  FIORICET, ESGIC  Notes to patient:  Next dose due anytime as needed   1 tablet, Oral, Every 6 Hours PRN      escitalopram 10 MG tablet  Commonly known as:  LEXAPRO  Notes to patient:  Next dose due tomorrow morning   TAKE 1 TABLET BY MOUTH DAILY      GLUCOSAMINE CHONDR 1500 COMPLX PO  Notes to patient:  Next dose due tomorrow morning   Oral      Melatonin 5 MG capsule  Notes to patient:  Next dose due tonight at bedtime   Oral, Nightly PRN      MULTIVITAMINS PO  Notes to patient:  Next dose due tomorrow morning   Oral      PROBIOTIC PO  Notes to patient:  Next dose due tomorrow morning   Oral         Stop These Medications    simvastatin 10 MG tablet  Commonly known as:  ZOCOR     valsartan-hydrochlorothiazide 80-12.5 MG per tablet  Commonly known as:  DIOVAN-HCT            Discharge Diet:    Dietary Orders     Start     Ordered    07/12/18 0704  Diet Regular; Cardiac  Diet Effective Now     Question Answer Comment   Diet Texture / Consistency Regular    Common Modifiers Cardiac        07/12/18 0703          Activity at Discharge:    Activity Instructions     Discharge Activity Restrictions       1) No driving for 2 weeks and no longer taking narcotics.    Additional Activity Instructions:      Continue to use your incentive spirometer for an additional 2 weeks.   Continue to wear your WOLFGANG hose for an additional 2 weeks. You may remove them at night.   Walk 10 minutes at a time at least 3 times a day.   Do not drive for 2 weeks and no longer taking narcotics.   Do not lift, push or pull greater than 10 pounds for 6 weeks.   You may shower, but do not submerge your incisions until your surgeon approves (i.e., no baths, pools, hot tubs, etc.).   Clean your incision daily in the shower with Dial or  Ivory soap. Do not put any additional lotions, creams, or any other substance on your incision without your surgeon's approval.                       Discharge disposition: home     Discharge Instructions and Follow ups:  No future appointments.  Additional Instructions for the Follow-ups that You Need to Schedule     Ambulatory Referral to Home Health    As directed      Face to Face Visit Date:  7/14/2018    Follow-up Provider for Plan of Care?:  I treated the patient in an acute care facility and will not continue treatment after discharge.    Follow-up Provider:  EDIN DELANEY [[id number]]    Reason/Clinical Findings:  post op CABG, valve replacement    Describe mobility limitations that make leaving home difficult:  post op weakness    Nursing/Therapeutic Services Requested:  Physical Therapy Skilled Nursing    Skilled nursing orders:  Post CABG care    PT orders:  Strengthening    Frequency:  1 Week 1         Discharge Follow-up with Specified Provider: Dr. Delaney; 1 Month    As directed      To:  Dr. Delaney    Follow Up:  1 Month         Discharge Follow-up with Specified Provider: MITCHELL Jules; 1 Week    As directed      To:  MITCHELL Jules    Follow Up:  1 Week            Contact information for follow-up providers     Milvia Briceno MD Follow up.    Specialty:  Family Medicine  Contact information:  [address] [facility] [address]  [phone]             [facility] [place] [facility] [place] Follow up.    Specialty:  Home Health Services  Contact information:  [address] [facility] [address] [facility] [address]  [facility] [address]           Edin Delaney MD Follow up in 1 month(s).    Specialty:  Cardiology  Contact information:  [address]  [facility] [address]  [phone]                   Contact information for after-discharge care     Home Medical Care     [facility] [place] Follow up.    Specialty:  Home Health  Services  Contact information:  [address] [facility] [address]  [facility] [address] [phone]                             Test Results Pending at Discharge: none     Gladys Burgos, MITCHELL  07/14/18  11:36 AM          Electronically signed by Gage Asencio MD at 7/14/2018  2:30 PM

## 2018-07-16 NOTE — PROGRESS NOTES
Case Management Discharge Note    Final Note: Discharged with [facility] to follow 7/14.     Destination     No service has been selected for the patient.      Durable Medical Equipment     No service has been selected for the patient.      Dialysis/Infusion     No service has been selected for the patient.      Home Medical Care     Service Request Status Selected Specialties Address Phone Number Fax Number    [facility] [address] [address] [phone]      Social Care     No service has been selected for the patient.        Other:  (private auto)    Final Discharge Disposition Code: 06 - home with home health care

## 2018-07-23 PROBLEM — Z95.3 S/P AORTIC VALVE REPLACEMENT WITH TISSUE: Status: ACTIVE | Noted: 2018-07-23

## 2018-07-24 ENCOUNTER — OFFICE VISIT (OUTPATIENT)
Dept: CARDIOLOGY | Facility: CLINIC | Age: 63
End: 2018-07-24

## 2018-07-24 VITALS
HEIGHT: 74 IN | WEIGHT: 248.8 LBS | HEART RATE: 62 BPM | SYSTOLIC BLOOD PRESSURE: 110 MMHG | BODY MASS INDEX: 31.93 KG/M2 | DIASTOLIC BLOOD PRESSURE: 72 MMHG

## 2018-07-24 DIAGNOSIS — Q23.1 AORTIC STENOSIS DUE TO BICUSPID AORTIC VALVE: Primary | ICD-10-CM

## 2018-07-24 DIAGNOSIS — Q23.0 AORTIC STENOSIS DUE TO BICUSPID AORTIC VALVE: Primary | ICD-10-CM

## 2018-07-24 DIAGNOSIS — Z95.3 S/P AORTIC VALVE REPLACEMENT WITH TISSUE: ICD-10-CM

## 2018-07-24 DIAGNOSIS — E78.2 MIXED HYPERLIPIDEMIA: ICD-10-CM

## 2018-07-24 DIAGNOSIS — I10 BENIGN ESSENTIAL HYPERTENSION: ICD-10-CM

## 2018-07-24 PROCEDURE — 93000 ELECTROCARDIOGRAM COMPLETE: CPT | Performed by: NURSE PRACTITIONER

## 2018-07-24 PROCEDURE — 99214 OFFICE O/P EST MOD 30 MIN: CPT | Performed by: NURSE PRACTITIONER

## 2018-07-24 NOTE — PROGRESS NOTES
Date of Office Visit: 2018  Encounter Provider: MITCHELL Lambert  Place of Service: [facility] CARDIOLOGY  Patient Name: Sachin Tolliver  :1955    Chief Complaint   Patient presents with   • Follow-up   :     HPI: Sachin Tolliver is a 62 y.o. male who presents today for hospital follow up. He is a new patient to me and his previous records have been reviewed. He has a past medical history of aortic valve disease (bicuspid, calcification and stenosis), hypertension, and hyperlipidemia. He was evaluated by Dr. Иван Irene in the office on  and had a repeat echocardiogram which showed moderate to severe aortic stenosis which was unchanged. However, there was new onset left ventricular cavity enlargement so Dr. Irene referred him to Dr. Vidales for surgical consultation. Dr. Vidales agreed that patient should proceed with cardiac cath and aortic valve replacement.     On  he underwent diagnostic cardiac catheterization noted to have 20% LAD proximal stenosis and moderate size second diagonal branch has an 80% ostial stenosis. . He was admitted and on 7/10 underwent aortic valve replacement with 29 mm Medtronic mosaic ultra porcine valve and CABG X 1 with vein graft to diagonal branch of the LAD. His post op course was essentially unremarkable. He was on amiodarone for AF prophylaxis and no arrhythmias noted. He was on losartan/HCTZ prior to admission and was discontinued during hospitalization. Blood pressure will be monitored outpatient.     Mr. Tolliver presents today for follow up.  He has done very well postoperatively.  He has some mild tenderness of his incision, but denies any chest pain or shortness of breath.  He's been active walking 30 minutes a day.  He's been compliant in wearing his WOLFGANG hose.  Blood pressure and heart rate are both normal.  His blood pressures been well-controlled without the losartan/HCTZ.  He denies PND, orthopnea,  palpitations, dizziness, or syncope.    The following portion of the patient's history were reviewed and updated as appropriate: past medical history, past surgical history, past social history, past family history, allergies, current medications, and problem list.    Past Medical History:   Diagnosis Date   • Aortic valve calcification    • Benign essential hypertension    • Broken nose    • Heart murmur    • Hemolytic anemia (CMS/HCC)    • Hyperlipidemia    • Migraine    • Mild mitral regurgitation    • Nonrheumatic aortic (valve) stenosis    • Pulmonic regurgitation     Trace   • Reactive depression (situational)    • Skin lesion     Suspecious; L Arm x2 Wks   • Tricuspid regurgitation     Trace       Past Surgical History:   Procedure Laterality Date   • AORTIC VALVE REPAIR/REPLACEMENT N/A 7/10/2018    Procedure: INTROP MARIAA; AORTIC VALVE REPLACEMENT; CORONARY ARTERY BYPASS X1 UTILIZING THE ENDOSCOPICALLY HARVESTED LEFT GREATER SAPHENOUS VEIN; PRP;  Surgeon: Hudson Vidales MD;  Location: [facility] OR;  Service: Cardiothoracic   • CARDIAC CATHETERIZATION N/A 7/9/2018    Procedure: Right and Left Heart Cath;  Surgeon: Alexandria Ashraf MD;  Location: [facility] CATH INVASIVE LOCATION;  Service: Cardiovascular   • CARDIAC CATHETERIZATION N/A 7/9/2018    Procedure: Coronary angiography;  Surgeon: Alexandria Ashraf MD;  Location: [facility] CATH INVASIVE LOCATION;  Service: Cardiovascular   • COLECTOMY PARTIAL / TOTAL     • CORONARY ARTERY BYPASS GRAFT     • OTHER SURGICAL HISTORY      Tunica Vaginalis Excision of Hydrocele Left   • TONSILLECTOMY         Social History     Social History   • Marital status:      Spouse name: N/A   • Number of children: N/A   • Years of education: N/A     Occupational History   • Not on file.     Social History Main Topics   • Smoking status: Never Smoker   • Smokeless tobacco: Never Used   • Alcohol use 0.6 oz/week     1 Cans of beer per week      Comment: Caffeine Use   • Drug use:  No   • Sexual activity: Defer       Family History   Problem Relation Age of Onset   • Anemia Father         Hemolytic   • Stroke Paternal Grandfather        Review of Systems   Constitution: Negative for chills, diaphoresis, fever, malaise/fatigue, night sweats, weight gain and weight loss.   HENT: Negative for hearing loss, nosebleeds, sore throat and tinnitus.    Eyes: Negative for blurred vision, double vision, pain and visual disturbance.   Cardiovascular: Negative for chest pain, claudication, cyanosis, dyspnea on exertion, irregular heartbeat, leg swelling, near-syncope, orthopnea, palpitations, paroxysmal nocturnal dyspnea and syncope.   Respiratory: Negative for cough, hemoptysis, shortness of breath, snoring and wheezing.    Endocrine: Negative for cold intolerance, heat intolerance and polyuria.   Hematologic/Lymphatic: Negative for bleeding problem. Does not bruise/bleed easily.   Skin: Negative for color change, dry skin, flushing and itching.   Musculoskeletal: Negative for falls, joint pain, joint swelling, muscle cramps, muscle weakness and myalgias.   Gastrointestinal: Negative for abdominal pain, constipation, heartburn, melena, nausea and vomiting.   Genitourinary: Negative for dysuria and hematuria.   Neurological: Negative for excessive daytime sleepiness, dizziness, light-headedness, loss of balance, numbness, paresthesias, seizures and vertigo.   Psychiatric/Behavioral: Negative for altered mental status, depression, memory loss and substance abuse. The patient does not have insomnia and is not nervous/anxious.    Allergic/Immunologic: Negative for environmental allergies.       No Known Allergies      Current Outpatient Prescriptions:   •  aspirin 81 MG tablet, Take by mouth., Disp: , Rfl:   •  atorvastatin (LIPITOR) 40 MG tablet, Take 1 tablet by mouth Every Night., Disp: 30 tablet, Rfl: 11  •  butalbital-acetaminophen-caffeine (FIORICET, ESGIC) -40 MG per tablet, Take 1 tablet by  "mouth Every 6 (Six) Hours As Needed for Headache., Disp: 25 tablet, Rfl: 1  •  escitalopram (LEXAPRO) 10 MG tablet, TAKE 1 TABLET BY MOUTH DAILY, Disp: 30 tablet, Rfl: 0  •  furosemide (LASIX) 40 MG tablet, Take 1 tablet by mouth Daily., Disp: 30 tablet, Rfl: 1  •  Glucosamine-Chondroit-Vit C-Mn (GLUCOSAMINE CHONDR 1500 COMPLX PO), Take by mouth., Disp: , Rfl:   •  Melatonin 5 MG capsule, Take  by mouth At Night As Needed., Disp: , Rfl:   •  metoprolol tartrate (LOPRESSOR) 50 MG tablet, Take 1 tablet by mouth Every 12 (Twelve) Hours., Disp: 60 tablet, Rfl: 11  •  Multiple Vitamin (MULTIVITAMINS PO), Take by mouth., Disp: , Rfl:   •  potassium chloride (MICRO-K) 10 MEQ CR capsule, Take 2 capsules by mouth Daily., Disp: 60 capsule, Rfl: 1  •  Probiotic Product (PROBIOTIC PO), Take  by mouth., Disp: , Rfl:       Objective:     Vitals:    07/24/18 0841   BP: 110/72   Pulse: 62   Weight: 113 kg (248 lb 12.8 oz)   Height: 188 cm (74\")     Body mass index is 31.94 kg/m².    PHYSICAL EXAM:    Vitals Reviewed.   General Appearance: No acute distress, well developed and well nourished.   Eyes: Conjunctiva and lids: No erythema, swelling, or discharge. Sclera non-icteric.   HENT: Atraumatic, normocephalic. External eyes, ears, and nose normal. No hearing loss noted. Mucous membranes normal. Lips not cyanotic. Neck supple with no tenderness.  Respiratory: No signs of respiratory distress. Respiration rhythm and depth normal.   Clear to auscultation. No rales, crackles, rhonchi, or wheezing auscultated.   Cardiovascular:  Jugular Venous Pressure: Normal  Heart Rate and Rhythm: Normal, Heart Sounds: Normal S1 and S2. No S3 or S4 noted.  Murmurs: No murmurs noted. No rubs, thrills, or gallops.   Chest Incision: CDI, healing well. Stab wounds CDI.   Arterial Pulses: Carotid pulses normal. No carotid bruit noted. Posterior tibialis and dorsalis pedis pulses normal.   Lower Extremities: Bilateral trace lower extremity edema noted. " Wearing WOLFGANG hose.   Gastrointestinal:  Abdomen soft, non-distended, non-tender. Normal bowel sounds. No hepatomegaly.   Musculoskeletal: Normal movement of extremities  Skin and Nails: General appearance normal. No pallor, cyanosis, diaphoresis. Skin temperature normal. No clubbing of fingernails.   Psychiatric: Patient alert and oriented to person, place, and time. Speech and behavior appropriate. Normal mood and affect.       ECG 12 Lead  Date/Time: 7/24/2018 8:38 AM  Performed by: JOHANA QUINTANA  Authorized by: JOHANA QUINTANA   Comparison: compared with previous ECG from 7/12/2018  Similar to previous ECG  Rhythm: sinus rhythm  Rate: normal  BPM: 62  Conduction: conduction normal  ST Segments: ST segments normal  T Waves: T waves normal  QRS axis: normal  Other findings: LVH  Q waves: II, III and aVF  Clinical impression: abnormal ECG              Assessment:       Diagnosis Plan   1. Aortic stenosis due to bicuspid aortic valve     2. S/P aortic valve replacement with tissue     3. Benign essential hypertension     4. Mixed hyperlipidemia            Plan:       1. Aortic Stenosis: s/p tissue aortic valve replacement with 29 mm Medtronic mosaic ultra porcine valve and CABG X 1 with vein graft to diagonal branch.  He is doing very well post open heart surgery.  He's remains active, denies symptoms, and blood pressure/heart rate are normal.  He would like to enroll in cardiac rehabilitation and the referral has been placed.    2. Coronary Artery Disease: s/p CABG x 1 (vein graft to the diagonal branch of LAD).  Continue aspirin, atorvastatin, and metoprolol.  Enroll in cardiac rehabilitation.    Coronary Artery Disease  Assessment  • The patient has no angina    Plan  • Lifestyle modifications discussed include adhering to a heart healthy diet, maintenance of a healthy weight, medication compliance, regular exercise and regular monitoring of cholesterol and blood pressure    Subjective - Objective  • There is a  history of previous coronary artery bypass graft  • Current antiplatelet therapy includes aspirin 81 mg      3. Hypertension: Blood pressure is well-controlled without the losartan/HCTZ.    4. Hyperlipidemia: Remains on atorvastatin with LDL goal less than 70.    5. Follow up with Dr. Irene as previously scheduled in August.     As always, it has been a pleasure to participate in your patient's care.      Sincerely,         MITCHELL Jules

## 2018-07-31 ENCOUNTER — OFFICE VISIT (OUTPATIENT)
Dept: CARDIAC REHAB | Facility: HOSPITAL | Age: 63
End: 2018-07-31

## 2018-07-31 VITALS
DIASTOLIC BLOOD PRESSURE: 88 MMHG | WEIGHT: 247.4 LBS | OXYGEN SATURATION: 98 % | BODY MASS INDEX: 31.75 KG/M2 | SYSTOLIC BLOOD PRESSURE: 120 MMHG | HEIGHT: 74 IN | HEART RATE: 64 BPM

## 2018-07-31 DIAGNOSIS — Z95.1 S/P SINGLE VESSEL CORONARY ARTERY BYPASS: Primary | ICD-10-CM

## 2018-07-31 DIAGNOSIS — Z95.2 S/P AORTIC VALVE REPLACEMENT: ICD-10-CM

## 2018-07-31 PROCEDURE — 93797 PHYS/QHP OP CAR RHAB WO ECG: CPT

## 2018-07-31 NOTE — PROGRESS NOTES
Cardiac Rehab Initial Assessment      Name: Sachin Tolliver  :1955 Allergies:Patient has no known allergies.   MRN: [id number] 62 y.o. Physician: Milvia Briceno MD   Primary Diagnosis:    Diagnosis Plan   1. S/P single vessel coronary artery bypass     2. S/P aortic valve replacement      Event Date: 7/10/2018 Specialist: Teto  Surgeon:  Flash   Secondary Diagnosis:  Risk Stratification:High Risk Note Author: Geraldine Tijerina RN     Cardiovascular History: Comments First heart event     EXERCISE AT HOME  No before OPH surgery, but since surgery he has been walking 30 minutes daily    N/A    EF: 65%      Source: echo 2018          Ambulatory Status:Independent  Ambulatory Fall Risk Assessed on Initial Visit: yes 6 Minute Walk Pre- Cardiac Rehab:  Distance:1533ft      RPE:3  Max. HR: 83       SPO2:96-99    MET: 3.2  MPH: 2.9             RPD: 0  Resting BP: 120/88 LA, 132/80 RA    Peak BP: 132/82  Recovery BP: 110/80  Comments: Pt walked full 6 minutes without stopping.  Denies any problems with walk.  Good balance and gait.      NUTRITION  Lipids:yes If yes, labs as follows;  Total: No components found for: CHOLESTEROL  HDL:   HDL Cholesterol   Date Value Ref Range Status   2018 36 (L) 40 - 60 mg/dL Final    Lipids continued:  LDL:  LDL Cholesterol    Date Value Ref Range Status   2018 102 (H) 0 - 100 mg/dL Final     Triglyceride: No components found for: TRIGLYCERIDE   Weight Management:                 Weight: 247.4 lbs  Height: 74 in                                   BMI: Body mass index is 31.76 kg/m².  Waist Circumference: 48  inches   Alcohol Use: 4 beers per week(s) Diabetes:No    Last HGBA1C with date if applicable:No components found for: A1C         SOCIAL HISTORY  Social History     Social History   • Marital status:      Social History Main Topics   • Smoking status: Never Smoker   • Smokeless tobacco: Never Used   • Alcohol use 0.6 oz/week     1 Cans of beer per week       Comment: Caffeine Use   • Drug use: No   • Sexual activity: Defer     Other Topics Concern   • Not on file       Educational Level (choose one that applies) post college graduate work or degree Learning Barriers:Ready to Learn, Vision wears prescription glasses    Family Support:yes    Living Arrangement: lives with their spouse    Risk Factors: Stress  Yes, Clinical Depression  Yes, Hyperlipidemia  Yes and Obesity  Yes     Tobacco Adjunct: No        Comorbidities: Denies comorbidities.  Did have colectomy in 2009 for chronic diverticulitis.  No problems since.  History of migraines     PSYCHOSOCIAL  Clinical Depression: yes    Stress: yes, some with work, but less since switching jobs.  Pt works at [employer] in [employer] area.  .   Assess presence or absence of depression using a valid screening tool: yes      PHYSICAL ASSESSMENT  Influenza vaccine: yes  Pneumococcal vaccine: no          Angina: no    Describe angina scale of 0 - 4: 0 = none    Today are you having incisional pain? N/A. If, Yes, Scale: na    Midline sternal incision clean, dry, intact.  Two small incisions upper back scabbed over.  Left inner knee incision closed and healed.    Today are you having any other pain? No. If, Yes, Scale: na     Diagnosed with Hypertension:yes    Heart Sounds: S1 S2 regular     Lung Sounds: normal air entry, lungs clear to auscultation         Assessment: Alert and appropriate to discussion.  Seems very interested in making some healthy changes. Orthopedic Problems: Denies any problems    Are you being hurt, hit, or frightened by anyone at home or in your life? no    Are you being neglected by a caregiver? N/A Shoulder flexibility/Range of motion: Above average     Recommended arm activity: Any    Chair sit and reach within: 10 inches left leg and 7 inches right leg   Leg flexibility: Average    Leg Strength/Balance/Five times sit to stand: 9 seconds.     Chose one:  Average    Recommended stretching: Standing    Assessment: Skin warm, dry, pink.  No edema    Family attends IA: no Time of arrival: 0940  Time of departure: 1040     Patient Goals: MET 4-5 Develop home exercise program to achieve 150 minutes per week.  Return to lifting weights.  Be able to wash car, mow grass, trim shrubs and rake leaves.  Be able to fish from bank or boat.  Lose 5-10 lbs in program with goal weight of 199 lbs.         7/31/2018  9:31 AM  Geraldine Tijerina RN

## 2018-08-03 ENCOUNTER — TREATMENT (OUTPATIENT)
Dept: CARDIAC REHAB | Facility: HOSPITAL | Age: 63
End: 2018-08-03

## 2018-08-03 DIAGNOSIS — Z95.1 S/P SINGLE VESSEL CORONARY ARTERY BYPASS: Primary | ICD-10-CM

## 2018-08-03 PROCEDURE — 93798 PHYS/QHP OP CAR RHAB W/ECG: CPT

## 2018-08-06 ENCOUNTER — TREATMENT (OUTPATIENT)
Dept: CARDIAC REHAB | Facility: HOSPITAL | Age: 63
End: 2018-08-06

## 2018-08-06 DIAGNOSIS — Z95.2 S/P AORTIC VALVE REPLACEMENT: ICD-10-CM

## 2018-08-06 DIAGNOSIS — Z95.1 S/P SINGLE VESSEL CORONARY ARTERY BYPASS: Primary | ICD-10-CM

## 2018-08-06 PROCEDURE — 93798 PHYS/QHP OP CAR RHAB W/ECG: CPT

## 2018-08-08 ENCOUNTER — TREATMENT (OUTPATIENT)
Dept: CARDIAC REHAB | Facility: HOSPITAL | Age: 63
End: 2018-08-08

## 2018-08-08 DIAGNOSIS — Z95.1 S/P SINGLE VESSEL CORONARY ARTERY BYPASS: Primary | ICD-10-CM

## 2018-08-08 PROCEDURE — 93798 PHYS/QHP OP CAR RHAB W/ECG: CPT

## 2018-08-08 RX ORDER — ESCITALOPRAM OXALATE 10 MG/1
TABLET ORAL
Qty: 30 TABLET | Refills: 0 | OUTPATIENT
Start: 2018-08-08

## 2018-08-10 ENCOUNTER — TREATMENT (OUTPATIENT)
Dept: CARDIAC REHAB | Facility: HOSPITAL | Age: 63
End: 2018-08-10

## 2018-08-10 DIAGNOSIS — Z95.1 S/P SINGLE VESSEL CORONARY ARTERY BYPASS: Primary | ICD-10-CM

## 2018-08-10 PROCEDURE — 93798 PHYS/QHP OP CAR RHAB W/ECG: CPT

## 2018-08-13 ENCOUNTER — TREATMENT (OUTPATIENT)
Dept: CARDIAC REHAB | Facility: HOSPITAL | Age: 63
End: 2018-08-13

## 2018-08-13 ENCOUNTER — OFFICE VISIT (OUTPATIENT)
Dept: CARDIAC SURGERY | Facility: CLINIC | Age: 63
End: 2018-08-13

## 2018-08-13 VITALS
RESPIRATION RATE: 20 BRPM | DIASTOLIC BLOOD PRESSURE: 75 MMHG | HEIGHT: 74 IN | TEMPERATURE: 98 F | HEART RATE: 71 BPM | WEIGHT: 247 LBS | SYSTOLIC BLOOD PRESSURE: 109 MMHG | BODY MASS INDEX: 31.7 KG/M2 | OXYGEN SATURATION: 97 %

## 2018-08-13 DIAGNOSIS — Z95.1 S/P CABG X 1: ICD-10-CM

## 2018-08-13 DIAGNOSIS — Z95.3 S/P AORTIC VALVE REPLACEMENT WITH TISSUE: Primary | ICD-10-CM

## 2018-08-13 DIAGNOSIS — Z95.1 S/P SINGLE VESSEL CORONARY ARTERY BYPASS: Primary | ICD-10-CM

## 2018-08-13 PROCEDURE — 93798 PHYS/QHP OP CAR RHAB W/ECG: CPT

## 2018-08-13 PROCEDURE — 99024 POSTOP FOLLOW-UP VISIT: CPT | Performed by: THORACIC SURGERY (CARDIOTHORACIC VASCULAR SURGERY)

## 2018-08-13 NOTE — PROGRESS NOTES
"CARDIOVASCULAR SURGERY FOLLOW-UP PROGRESS NOTE  Chief Complaint: Ear for follow-up after aortic valve replacement and CABG        HPI:   Dear Milvia Maher MD and colleagues:    It was nice to see Sachin Tolliver in follow up 6 weeks  after surgery.  As you know, he is a 62 y.o. male with severe aortic stenosis and single-vessel coronary disease involving a diagonal branch the LAD who underwent aortic valve replacement with a 29 mm porcine valve and CABG ×1 on 7/10/18. He did well postoperatively and continues to do well. He comes in today complaining of nothing.  His activity level has been good.       Physical Exam:         /75 (BP Location: Right arm, Patient Position: Sitting, Cuff Size: Large Adult)   Pulse 71   Temp 98 °F (36.7 °C) (Oral)   Resp 20   Ht 188 cm (74\")   Wt 112 kg (247 lb)   SpO2 97%   BMI 31.71 kg/m²   Heart:  regular rate and rhythm, S1, S2 normal, no murmur, click, rub or gallop  Lungs:  clear to auscultation bilaterally  Extremities:  no edema  Incision(s):  mid chest healing well, left leg healing well, sternum stable    Assessment/Plan:     S/P CABG and AVR. Overall, he is doing well.    No significant post-op complications    Keep incisions clean and dry  OK to drive if not taking narcotic pain medicine  OK to begin cardiac rehab  Follow-up as scheduled with cardiology  Follow-up as scheduled with PCP  Return to clinic in 6 month(s) with no new studies    No restrictions of activity.      Thank you for allowing me to participate in the care of your   patient.  Regards,  Hudson Vidales MD    "

## 2018-08-15 ENCOUNTER — TREATMENT (OUTPATIENT)
Dept: CARDIAC REHAB | Facility: HOSPITAL | Age: 63
End: 2018-08-15

## 2018-08-15 DIAGNOSIS — Z95.1 S/P SINGLE VESSEL CORONARY ARTERY BYPASS: Primary | ICD-10-CM

## 2018-08-15 PROCEDURE — 93798 PHYS/QHP OP CAR RHAB W/ECG: CPT

## 2018-08-15 NOTE — PROGRESS NOTES
CARDIAC/PULMONARY REHAB NUTRITION EDUCATION/ASSESSMENT      EDUCATION:   Attended diet and nutrition class.  Reviewed the AHA diet guidelines, The,Mediterranean Diet and Plant based diet. Discussed the DASH diet and meal strategies. DIet guidelines to include high fiber, antioxidant rich foods and healthy fats were presented. Supportive written information was provided.         5:47 PM  8/15/2018  Donna Kinney RD

## 2018-08-17 ENCOUNTER — TREATMENT (OUTPATIENT)
Dept: CARDIAC REHAB | Facility: HOSPITAL | Age: 63
End: 2018-08-17

## 2018-08-17 ENCOUNTER — OFFICE VISIT (OUTPATIENT)
Dept: FAMILY MEDICINE CLINIC | Facility: CLINIC | Age: 63
End: 2018-08-17

## 2018-08-17 VITALS
OXYGEN SATURATION: 98 % | WEIGHT: 245 LBS | HEART RATE: 76 BPM | HEIGHT: 74 IN | BODY MASS INDEX: 31.44 KG/M2 | SYSTOLIC BLOOD PRESSURE: 126 MMHG | DIASTOLIC BLOOD PRESSURE: 92 MMHG | RESPIRATION RATE: 16 BRPM

## 2018-08-17 DIAGNOSIS — F43.21 SITUATIONAL DEPRESSION: Primary | ICD-10-CM

## 2018-08-17 DIAGNOSIS — Z95.1 S/P SINGLE VESSEL CORONARY ARTERY BYPASS: Primary | ICD-10-CM

## 2018-08-17 DIAGNOSIS — I10 BENIGN ESSENTIAL HYPERTENSION: ICD-10-CM

## 2018-08-17 PROCEDURE — 93798 PHYS/QHP OP CAR RHAB W/ECG: CPT

## 2018-08-17 PROCEDURE — 99212 OFFICE O/P EST SF 10 MIN: CPT | Performed by: FAMILY MEDICINE

## 2018-08-17 RX ORDER — ESCITALOPRAM OXALATE 10 MG/1
10 TABLET ORAL DAILY
Qty: 90 TABLET | Refills: 3 | Status: SHIPPED | OUTPATIENT
Start: 2018-08-17 | End: 2019-08-15 | Stop reason: SDUPTHER

## 2018-08-17 NOTE — PROGRESS NOTES
Problem List Items Addressed This Visit        Cardiovascular and Mediastinum    Benign essential hypertension    Overview                Current Assessment & Plan     Kelvin 8/17/2018  BP elevated today. He will monitor. D/W Dr. Irene            Other    Situational depression - Primary    Relevant Medications    escitalopram (LEXAPRO) 10 MG tablet             Return in about 1 year (around 8/17/2019) for Annual physical.  There are no Patient Instructions on file for this visit.  Sachin Tolliver is a 62 y.o. male being seen in our office today for Hypertension                 He  reports that he has never smoked. He has never used smokeless tobacco. He reports that he drinks about 2.4 oz of alcohol per week . He reports that he does not use drugs.             HPI  He is doing well post-surgery thoracotomy. His BP is up today (on recheck it was still a little high). Needs refill on his lexapro. He will see Teto soon and recheck his BP then.              The following portions of the patient's history were reviewed and updated as appropriate:PMHroutine: Social history , Allergies, Current Medications, Active Problem List and Health Maintenance            Review of Systems   Constitutional: Negative for activity change, appetite change, chills, fatigue, fever and unexpected weight change.   HENT: Negative for congestion, ear pain, hearing loss, mouth sores, nosebleeds, rhinorrhea and sore throat.    Eyes: Negative for pain and visual disturbance.   Respiratory: Negative for cough, shortness of breath and wheezing.    Cardiovascular: Negative for chest pain, palpitations and leg swelling.   Gastrointestinal: Negative for abdominal distention, abdominal pain, blood in stool, constipation, diarrhea, nausea and vomiting.   Endocrine: Negative for cold intolerance and heat intolerance.   Genitourinary: Negative for difficulty urinating, discharge, dysuria, frequency, hematuria and urgency.   Musculoskeletal:  Negative for back pain and joint swelling.   Skin: Negative for rash and wound.   Neurological: Negative for dizziness, weakness, numbness and headaches.   Hematological: Does not bruise/bleed easily.   Psychiatric/Behavioral: Negative for confusion, dysphoric mood, sleep disturbance and suicidal ideas. The patient is not nervous/anxious.                  BP Readings from Last 1 Encounters:   08/17/18 126/92     Wt Readings from Last 3 Encounters:   08/17/18 111 kg (245 lb)   08/13/18 112 kg (247 lb)   07/31/18 112 kg (247 lb 6.4 oz)   Body mass index is 31.46 kg/m².                 Physical Exam   Constitutional: He appears well-developed and well-nourished.   Cardiovascular: Regular rhythm and normal heart sounds.    Pulmonary/Chest:   Well healing thoracotomy scar   Psychiatric: He has a normal mood and affect. His behavior is normal. Judgment and thought content normal.   Vitals reviewed.

## 2018-08-24 ENCOUNTER — APPOINTMENT (OUTPATIENT)
Dept: CT IMAGING | Facility: HOSPITAL | Age: 63
End: 2018-08-24

## 2018-08-24 ENCOUNTER — HOSPITAL ENCOUNTER (EMERGENCY)
Facility: HOSPITAL | Age: 63
Discharge: HOME | End: 2018-08-24
Attending: EMERGENCY MEDICINE | Admitting: EMERGENCY MEDICINE

## 2018-08-24 ENCOUNTER — OFFICE VISIT (OUTPATIENT)
Dept: FAMILY MEDICINE CLINIC | Facility: CLINIC | Age: 63
End: 2018-08-24

## 2018-08-24 ENCOUNTER — APPOINTMENT (OUTPATIENT)
Dept: GENERAL RADIOLOGY | Facility: HOSPITAL | Age: 63
End: 2018-08-24

## 2018-08-24 VITALS
OXYGEN SATURATION: 98 % | SYSTOLIC BLOOD PRESSURE: 108 MMHG | BODY MASS INDEX: 31.95 KG/M2 | WEIGHT: 249 LBS | HEIGHT: 74 IN | TEMPERATURE: 97.2 F | DIASTOLIC BLOOD PRESSURE: 78 MMHG | HEART RATE: 77 BPM

## 2018-08-24 VITALS
HEIGHT: 74 IN | SYSTOLIC BLOOD PRESSURE: 108 MMHG | DIASTOLIC BLOOD PRESSURE: 84 MMHG | BODY MASS INDEX: 31.7 KG/M2 | RESPIRATION RATE: 16 BRPM | OXYGEN SATURATION: 96 % | TEMPERATURE: 98.5 F | WEIGHT: 247 LBS | HEART RATE: 83 BPM

## 2018-08-24 DIAGNOSIS — R53.83 FATIGUE, UNSPECIFIED TYPE: ICD-10-CM

## 2018-08-24 DIAGNOSIS — I24.1 DRESSLER SYNDROME (HCC): Primary | ICD-10-CM

## 2018-08-24 DIAGNOSIS — R50.9 FEVER, UNSPECIFIED FEVER CAUSE: Primary | ICD-10-CM

## 2018-08-24 PROCEDURE — 87633 RESP VIRUS 12-25 TARGETS: CPT | Performed by: EMERGENCY MEDICINE

## 2018-08-24 PROCEDURE — 81003 URINALYSIS AUTO W/O SCOPE: CPT | Performed by: EMERGENCY MEDICINE

## 2018-08-24 PROCEDURE — 87040 BLOOD CULTURE FOR BACTERIA: CPT | Performed by: EMERGENCY MEDICINE

## 2018-08-24 PROCEDURE — 87081 CULTURE SCREEN ONLY: CPT | Performed by: EMERGENCY MEDICINE

## 2018-08-24 PROCEDURE — 87798 DETECT AGENT NOS DNA AMP: CPT | Performed by: EMERGENCY MEDICINE

## 2018-08-24 PROCEDURE — 25010000002 IOPAMIDOL 61 % SOLUTION: Performed by: EMERGENCY MEDICINE

## 2018-08-24 PROCEDURE — 86308 HETEROPHILE ANTIBODY SCREEN: CPT | Performed by: EMERGENCY MEDICINE

## 2018-08-24 PROCEDURE — 99283 EMERGENCY DEPT VISIT LOW MDM: CPT

## 2018-08-24 PROCEDURE — 87880 STREP A ASSAY W/OPTIC: CPT | Performed by: EMERGENCY MEDICINE

## 2018-08-24 PROCEDURE — 85610 PROTHROMBIN TIME: CPT | Performed by: EMERGENCY MEDICINE

## 2018-08-24 PROCEDURE — 71046 X-RAY EXAM CHEST 2 VIEWS: CPT

## 2018-08-24 PROCEDURE — 84145 PROCALCITONIN (PCT): CPT | Performed by: EMERGENCY MEDICINE

## 2018-08-24 PROCEDURE — 80053 COMPREHEN METABOLIC PANEL: CPT | Performed by: EMERGENCY MEDICINE

## 2018-08-24 PROCEDURE — 87486 CHLMYD PNEUM DNA AMP PROBE: CPT | Performed by: EMERGENCY MEDICINE

## 2018-08-24 PROCEDURE — 85025 COMPLETE CBC W/AUTO DIFF WBC: CPT | Performed by: EMERGENCY MEDICINE

## 2018-08-24 PROCEDURE — 87581 M.PNEUMON DNA AMP PROBE: CPT | Performed by: EMERGENCY MEDICINE

## 2018-08-24 PROCEDURE — 99214 OFFICE O/P EST MOD 30 MIN: CPT | Performed by: NURSE PRACTITIONER

## 2018-08-24 PROCEDURE — 83605 ASSAY OF LACTIC ACID: CPT | Performed by: EMERGENCY MEDICINE

## 2018-08-24 PROCEDURE — 70491 CT SOFT TISSUE NECK W/DYE: CPT

## 2018-08-24 RX ORDER — SODIUM CHLORIDE 0.9 % (FLUSH) 0.9 %
10 SYRINGE (ML) INJECTION AS NEEDED
Status: DISCONTINUED | OUTPATIENT
Start: 2018-08-24 | End: 2018-08-24 | Stop reason: HOSPADM

## 2018-08-24 RX ORDER — IBUPROFEN 600 MG/1
600 TABLET ORAL 3 TIMES DAILY
Qty: 10 TABLET | Refills: 0 | Status: SHIPPED | OUTPATIENT
Start: 2018-08-24 | End: 2018-09-12 | Stop reason: SDUPTHER

## 2018-08-24 RX ADMIN — IOPAMIDOL 75 ML: 612 INJECTION, SOLUTION INTRAVENOUS at 10:30

## 2018-08-24 NOTE — PROGRESS NOTES
"Sachin Tolliver is a 63 y.o. male.[facility] states that he had open heart surgery six weeks ago. 5 days ago he developed a fever up to 100.9. He admits to a sore throat, no pain with swallowing and a headache. He was seen at an immediate care on Sunday and was given Augmentin.   Pt had bypass surgery and a tricuspid valve replacement in mid July, has not called his surgeon.  Reports that when he takes a deep breath his neck hurts.      Assessment/Plan   Problem List Items Addressed This Visit     None      Visit Diagnoses     Fever, unspecified fever cause    -  Primary    Fatigue, unspecified type                 No Follow-up on file.  There are no Patient Instructions on file for this visit.    Chief Complaint   Patient presents with   • Sore Throat     Social History   Substance Use Topics   • Smoking status: Never Smoker   • Smokeless tobacco: Never Used   • Alcohol use 2.4 oz/week     4 Cans of beer per week      Comment: Caffeine Use       History of Present Illness     The following portions of the patient's history were reviewed and updated as appropriate:PMHroutine: Social history , Surgical history , Allergies, Current Medications and Active Problem List    Review of Systems   Constitutional: Positive for activity change, fatigue and fever.   HENT: Positive for sore throat.    Neurological: Positive for headaches.       Objective   Vitals:    08/24/18 0819   BP: 108/78   Pulse: 77   Temp: 97.2 °F (36.2 °C)   SpO2: 98%   Weight: 113 kg (249 lb)   Height: 188 cm (74\")     Body mass index is 31.97 kg/m².  Physical Exam   Constitutional: He appears well-developed and well-nourished. He appears distressed.   HENT:   Head: Normocephalic and atraumatic.   Right Ear: External ear normal.   Left Ear: External ear normal.   Mouth/Throat: Oropharynx is clear and moist.   Eyes: EOM are normal.   Cardiovascular: Normal rate and regular rhythm.    Pulmonary/Chest: Effort normal and breath sounds normal.   Musculoskeletal: " Normal range of motion.   Neurological: He is alert.   Nursing note and vitals reviewed.    Reviewed Data:  No visits with results within 1 Month(s) from this visit.   Latest known visit with results is:   Admission on 07/09/2018, Discharged on 07/14/2018   No results displayed because visit has over 200 results.        Discussed with Dr. Ambrocio and pt will need to go to the ER for further work up.  Report given to triage nurse Ervin

## 2018-08-27 ENCOUNTER — TREATMENT (OUTPATIENT)
Dept: CARDIAC REHAB | Facility: HOSPITAL | Age: 63
End: 2018-08-27

## 2018-08-27 DIAGNOSIS — Z95.2 S/P AORTIC VALVE REPLACEMENT: ICD-10-CM

## 2018-08-27 DIAGNOSIS — Z95.1 S/P SINGLE VESSEL CORONARY ARTERY BYPASS: Primary | ICD-10-CM

## 2018-08-27 PROCEDURE — 93798 PHYS/QHP OP CAR RHAB W/ECG: CPT

## 2018-08-29 ENCOUNTER — TREATMENT (OUTPATIENT)
Dept: CARDIAC REHAB | Facility: HOSPITAL | Age: 63
End: 2018-08-29

## 2018-08-29 DIAGNOSIS — I35.0 NONRHEUMATIC AORTIC VALVE STENOSIS: Primary | ICD-10-CM

## 2018-08-29 PROCEDURE — 93798 PHYS/QHP OP CAR RHAB W/ECG: CPT

## 2018-08-31 ENCOUNTER — OFFICE VISIT (OUTPATIENT)
Dept: CARDIOLOGY | Facility: CLINIC | Age: 63
End: 2018-08-31

## 2018-08-31 ENCOUNTER — TREATMENT (OUTPATIENT)
Dept: CARDIAC REHAB | Facility: HOSPITAL | Age: 63
End: 2018-08-31

## 2018-08-31 VITALS
HEIGHT: 74 IN | DIASTOLIC BLOOD PRESSURE: 72 MMHG | HEART RATE: 71 BPM | SYSTOLIC BLOOD PRESSURE: 100 MMHG | WEIGHT: 247 LBS | BODY MASS INDEX: 31.7 KG/M2 | OXYGEN SATURATION: 100 %

## 2018-08-31 DIAGNOSIS — Z95.2 S/P AORTIC VALVE REPLACEMENT: ICD-10-CM

## 2018-08-31 DIAGNOSIS — Z95.1 S/P CABG X 1: ICD-10-CM

## 2018-08-31 DIAGNOSIS — Z95.3 S/P AORTIC VALVE REPLACEMENT WITH TISSUE: Primary | ICD-10-CM

## 2018-08-31 DIAGNOSIS — Z95.1 S/P SINGLE VESSEL CORONARY ARTERY BYPASS: Primary | ICD-10-CM

## 2018-08-31 PROCEDURE — 99214 OFFICE O/P EST MOD 30 MIN: CPT | Performed by: INTERNAL MEDICINE

## 2018-08-31 PROCEDURE — 93798 PHYS/QHP OP CAR RHAB W/ECG: CPT

## 2018-09-05 ENCOUNTER — TREATMENT (OUTPATIENT)
Dept: CARDIAC REHAB | Facility: HOSPITAL | Age: 63
End: 2018-09-05

## 2018-09-05 DIAGNOSIS — Z95.1 S/P SINGLE VESSEL CORONARY ARTERY BYPASS: Primary | ICD-10-CM

## 2018-09-05 DIAGNOSIS — Z95.2 S/P AORTIC VALVE REPLACEMENT: ICD-10-CM

## 2018-09-05 PROCEDURE — 93798 PHYS/QHP OP CAR RHAB W/ECG: CPT

## 2018-09-10 ENCOUNTER — TREATMENT (OUTPATIENT)
Dept: CARDIAC REHAB | Facility: HOSPITAL | Age: 63
End: 2018-09-10

## 2018-09-10 DIAGNOSIS — Z95.2 S/P AORTIC VALVE REPLACEMENT: ICD-10-CM

## 2018-09-10 DIAGNOSIS — Z95.1 S/P SINGLE VESSEL CORONARY ARTERY BYPASS: Primary | ICD-10-CM

## 2018-09-10 PROCEDURE — 93798 PHYS/QHP OP CAR RHAB W/ECG: CPT

## 2018-09-12 ENCOUNTER — TELEPHONE (OUTPATIENT)
Dept: CARDIAC SURGERY | Facility: CLINIC | Age: 63
End: 2018-09-12

## 2018-09-12 ENCOUNTER — APPOINTMENT (OUTPATIENT)
Dept: CARDIAC REHAB | Facility: HOSPITAL | Age: 63
End: 2018-09-12

## 2018-09-12 ENCOUNTER — TELEPHONE (OUTPATIENT)
Dept: CARDIOLOGY | Facility: CLINIC | Age: 63
End: 2018-09-12

## 2018-09-12 NOTE — TELEPHONE ENCOUNTER
Patients wife called to report he is again feeling like he did when he visited the ER on 8/24. He is very lethargic, has a sore throat and is running a low grade fever on and off for the last several days. 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 I spoke with Taylor MEDRANO who feels the patient needs an echo. He has an echo scheduled for 9/20/18 and I have called and left a message for Dr Irene's medical assistant to see if the date can be moved up

## 2018-09-12 NOTE — TELEPHONE ENCOUNTER
After speaking with Dr Vidales I called patient and let him know Dr Vidales recommends he start Ibuprofin 600mg three times daily for 30 days. This medication was called in to the [facility] per Mr Brennan's request

## 2018-09-12 NOTE — TELEPHONE ENCOUNTER
Pt called in stating he was seen for dressler syndrome 3 to 4wks ago and now it has came back and wants to know if he needs to follow up with  or Dr. Vidales. Thanks

## 2018-09-12 NOTE — TELEPHONE ENCOUNTER
Pt is S/P aortic valve replacement and S/P CABG.  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  Pt's last OV was 08/31.   The pt has been recommended/ scheduled for an echo on 09/20.  Dr. Sharma's office would like to see if that could be moved up.     I s/w Li () who will work on getting the pt a sooner appt for the echo.      @ Dr. Irene any other instructions?

## 2018-09-14 ENCOUNTER — APPOINTMENT (OUTPATIENT)
Dept: CARDIAC REHAB | Facility: HOSPITAL | Age: 63
End: 2018-09-14

## 2018-09-14 ENCOUNTER — HOSPITAL ENCOUNTER (OUTPATIENT)
Dept: CARDIOLOGY | Facility: HOSPITAL | Age: 63
Discharge: HOME | End: 2018-09-14
Attending: INTERNAL MEDICINE | Admitting: INTERNAL MEDICINE

## 2018-09-14 VITALS
SYSTOLIC BLOOD PRESSURE: 100 MMHG | BODY MASS INDEX: 31.7 KG/M2 | HEIGHT: 74 IN | WEIGHT: 247 LBS | DIASTOLIC BLOOD PRESSURE: 54 MMHG | HEART RATE: 68 BPM

## 2018-09-14 DIAGNOSIS — Z95.3 S/P AORTIC VALVE REPLACEMENT WITH TISSUE: ICD-10-CM

## 2018-09-14 LAB
AORTIC DIMENSIONLESS INDEX: 0.4 (DI)
ASCENDING AORTA: 3.9 CM
BH CV ECHO MEAS - AO ACC SLOPE: 4.5 CM/SEC^2
BH CV ECHO MEAS - AO ACC TIME: 0.15 SEC
BH CV ECHO MEAS - AO MAX PG (FULL): 18.7 MMHG
BH CV ECHO MEAS - AO MAX PG: 21.6 MMHG
BH CV ECHO MEAS - AO MEAN PG (FULL): 11.8 MMHG
BH CV ECHO MEAS - AO MEAN PG: 13.5 MMHG
BH CV ECHO MEAS - AO ROOT AREA (BSA CORRECTED): 1.4
BH CV ECHO MEAS - AO ROOT AREA: 9.2 CM^2
BH CV ECHO MEAS - AO ROOT DIAM: 3.4 CM
BH CV ECHO MEAS - AO V2 MAX: 232.1 CM/SEC
BH CV ECHO MEAS - AO V2 MEAN: 171.3 CM/SEC
BH CV ECHO MEAS - AO V2 VTI: 47.6 CM
BH CV ECHO MEAS - AT: 150 SEC
BH CV ECHO MEAS - AVA(I,A): 1.4 CM^2
BH CV ECHO MEAS - AVA(I,D): 1.4 CM^2
BH CV ECHO MEAS - AVA(V,A): 1.3 CM^2
BH CV ECHO MEAS - AVA(V,D): 1.3 CM^2
BH CV ECHO MEAS - BSA(HAYCOCK): 2.4 M^2
BH CV ECHO MEAS - BSA: 2.4 M^2
BH CV ECHO MEAS - BZI_BMI: 31.7 KILOGRAMS/M^2
BH CV ECHO MEAS - BZI_METRIC_HEIGHT: 188 CM
BH CV ECHO MEAS - BZI_METRIC_WEIGHT: 112 KG
BH CV ECHO MEAS - EDV(MOD-SP2): 125 ML
BH CV ECHO MEAS - EDV(MOD-SP4): 141 ML
BH CV ECHO MEAS - EDV(TEICH): 164 ML
BH CV ECHO MEAS - EF(CUBED): 61.6 %
BH CV ECHO MEAS - EF(MOD-BP): 52 %
BH CV ECHO MEAS - EF(MOD-SP2): 51.2 %
BH CV ECHO MEAS - EF(MOD-SP4): 53.2 %
BH CV ECHO MEAS - EF(TEICH): 52.5 %
BH CV ECHO MEAS - ESV(MOD-SP2): 61 ML
BH CV ECHO MEAS - ESV(MOD-SP4): 66 ML
BH CV ECHO MEAS - ESV(TEICH): 78 ML
BH CV ECHO MEAS - FS: 27.3 %
BH CV ECHO MEAS - IVS/LVPW: 1.1
BH CV ECHO MEAS - IVSD: 1.2 CM
BH CV ECHO MEAS - LV DIASTOLIC VOL/BSA (35-75): 59.3 ML/M^2
BH CV ECHO MEAS - LV MASS(C)D: 284.4 GRAMS
BH CV ECHO MEAS - LV MASS(C)DI: 119.6 GRAMS/M^2
BH CV ECHO MEAS - LV MAX PG: 2.9 MMHG
BH CV ECHO MEAS - LV MEAN PG: 1.6 MMHG
BH CV ECHO MEAS - LV SYSTOLIC VOL/BSA (12-30): 27.8 ML/M^2
BH CV ECHO MEAS - LV V1 MAX: 85 CM/SEC
BH CV ECHO MEAS - LV V1 MEAN: 62 CM/SEC
BH CV ECHO MEAS - LV V1 VTI: 18.3 CM
BH CV ECHO MEAS - LVIDD: 5.8 CM
BH CV ECHO MEAS - LVIDS: 4.2 CM
BH CV ECHO MEAS - LVLD AP2: 8.5 CM
BH CV ECHO MEAS - LVLD AP4: 9 CM
BH CV ECHO MEAS - LVLS AP2: 7 CM
BH CV ECHO MEAS - LVLS AP4: 7.8 CM
BH CV ECHO MEAS - LVOT AREA (M): 3.5 CM^2
BH CV ECHO MEAS - LVOT AREA: 3.6 CM^2
BH CV ECHO MEAS - LVOT DIAM: 2.1 CM
BH CV ECHO MEAS - LVPWD: 1.1 CM
BH CV ECHO MEAS - MR MAX PG: 34.4 MMHG
BH CV ECHO MEAS - MR MAX VEL: 293.2 CM/SEC
BH CV ECHO MEAS - MV A DUR: 0.11 SEC
BH CV ECHO MEAS - MV A MAX VEL: 51.8 CM/SEC
BH CV ECHO MEAS - MV DEC SLOPE: 249.7 CM/SEC^2
BH CV ECHO MEAS - MV DEC TIME: 0.21 SEC
BH CV ECHO MEAS - MV E MAX VEL: 51.8 CM/SEC
BH CV ECHO MEAS - MV E/A: 1
BH CV ECHO MEAS - MV MAX PG: 1.4 MMHG
BH CV ECHO MEAS - MV MEAN PG: 0.79 MMHG
BH CV ECHO MEAS - MV P1/2T MAX VEL: 53.5 CM/SEC
BH CV ECHO MEAS - MV P1/2T: 62.7 MSEC
BH CV ECHO MEAS - MV V2 MAX: 58.6 CM/SEC
BH CV ECHO MEAS - MV V2 MEAN: 42.6 CM/SEC
BH CV ECHO MEAS - MV V2 VTI: 21.1 CM
BH CV ECHO MEAS - MVA P1/2T LCG: 4.1 CM^2
BH CV ECHO MEAS - MVA(P1/2T): 3.5 CM^2
BH CV ECHO MEAS - MVA(VTI): 3.1 CM^2
BH CV ECHO MEAS - PA ACC TIME: 0.16 SEC
BH CV ECHO MEAS - PA MAX PG (FULL): 9.9 MMHG
BH CV ECHO MEAS - PA MAX PG: 11.4 MMHG
BH CV ECHO MEAS - PA PR(ACCEL): 7.7 MMHG
BH CV ECHO MEAS - PA V2 MAX: 168.5 CM/SEC
BH CV ECHO MEAS - PULM A REVS DUR: 0.1 SEC
BH CV ECHO MEAS - PULM A REVS VEL: 20.2 CM/SEC
BH CV ECHO MEAS - PULM DIAS VEL: 76.4 CM/SEC
BH CV ECHO MEAS - PULM S/D: 0.54
BH CV ECHO MEAS - PULM SYS VEL: 41.5 CM/SEC
BH CV ECHO MEAS - PVA(V,A): 1 CM^2
BH CV ECHO MEAS - PVA(V,D): 1 CM^2
BH CV ECHO MEAS - QP/QS: 0.53
BH CV ECHO MEAS - RAP SYSTOLE: 3 MMHG
BH CV ECHO MEAS - RV MAX PG: 1.4 MMHG
BH CV ECHO MEAS - RV MEAN PG: 0.78 MMHG
BH CV ECHO MEAS - RV V1 MAX: 59.7 CM/SEC
BH CV ECHO MEAS - RV V1 MEAN: 41.1 CM/SEC
BH CV ECHO MEAS - RV V1 VTI: 12.4 CM
BH CV ECHO MEAS - RVOT AREA: 2.8 CM^2
BH CV ECHO MEAS - RVOT DIAM: 1.9 CM
BH CV ECHO MEAS - RVSP: 16 MMHG
BH CV ECHO MEAS - SI(AO): 185 ML/M^2
BH CV ECHO MEAS - SI(CUBED): 49.6 ML/M^2
BH CV ECHO MEAS - SI(LVOT): 27.7 ML/M^2
BH CV ECHO MEAS - SI(MOD-SP2): 26.9 ML/M^2
BH CV ECHO MEAS - SI(MOD-SP4): 31.6 ML/M^2
BH CV ECHO MEAS - SI(TEICH): 36.2 ML/M^2
BH CV ECHO MEAS - SUP REN AO DIAM: 2.3 CM
BH CV ECHO MEAS - SV(AO): 439.8 ML
BH CV ECHO MEAS - SV(CUBED): 117.9 ML
BH CV ECHO MEAS - SV(LVOT): 66 ML
BH CV ECHO MEAS - SV(MOD-SP2): 64 ML
BH CV ECHO MEAS - SV(MOD-SP4): 75 ML
BH CV ECHO MEAS - SV(RVOT): 35.2 ML
BH CV ECHO MEAS - SV(TEICH): 86 ML
BH CV ECHO MEAS - TR MAX VEL: 179.8 CM/SEC
BH CV XLRA - RV BASE: 3.5 CM
LEFT ATRIUM VOLUME INDEX: 29 ML/M2
SINUS: 3.6 CM
STJ: 2.6 CM

## 2018-09-14 PROCEDURE — 93306 TTE W/DOPPLER COMPLETE: CPT

## 2018-09-14 PROCEDURE — 93306 TTE W/DOPPLER COMPLETE: CPT | Performed by: INTERNAL MEDICINE

## 2018-09-17 ENCOUNTER — APPOINTMENT (OUTPATIENT)
Dept: CARDIAC REHAB | Facility: HOSPITAL | Age: 63
End: 2018-09-17

## 2018-09-19 ENCOUNTER — TELEPHONE (OUTPATIENT)
Dept: CARDIAC SURGERY | Facility: CLINIC | Age: 63
End: 2018-09-19

## 2018-09-19 ENCOUNTER — APPOINTMENT (OUTPATIENT)
Dept: CARDIAC REHAB | Facility: HOSPITAL | Age: 63
End: 2018-09-19

## 2018-09-19 NOTE — TELEPHONE ENCOUNTER
Mr Tolliver calls today to ask if he should be seeing cardiology or his primary care physician or our office for his symptoms. He was diagnosed with Dressler syndrome . He was prescribed and has been taking ibuprofen for this. Initially it helped but now the low grade fever and fatigue have returned. He is unable to get an appointment with [facility] until 9/24/18. I spoke with Dr Vidales who recommends that he be seen sooner . I have called and left a message for Jigna ASH asking if she would be able to see patient sooner but she is apparently off today. I have left a message asking [facility] to call our office and we will see if we can get him in sooner

## 2018-09-20 ENCOUNTER — OFFICE VISIT (OUTPATIENT)
Dept: CARDIOLOGY | Facility: CLINIC | Age: 63
End: 2018-09-20

## 2018-09-20 VITALS
BODY MASS INDEX: 32.21 KG/M2 | HEART RATE: 76 BPM | SYSTOLIC BLOOD PRESSURE: 110 MMHG | WEIGHT: 251 LBS | DIASTOLIC BLOOD PRESSURE: 80 MMHG | HEIGHT: 74 IN

## 2018-09-20 DIAGNOSIS — Z95.3 S/P AORTIC VALVE REPLACEMENT WITH TISSUE: Primary | ICD-10-CM

## 2018-09-20 DIAGNOSIS — I24.1 DRESSLER SYNDROME (HCC): ICD-10-CM

## 2018-09-20 DIAGNOSIS — Z95.1 S/P CABG X 1: ICD-10-CM

## 2018-09-20 PROBLEM — I35.0 NONRHEUMATIC AORTIC VALVE STENOSIS: Status: RESOLVED | Noted: 2018-06-21 | Resolved: 2018-09-20

## 2018-09-20 PROCEDURE — 93000 ELECTROCARDIOGRAM COMPLETE: CPT | Performed by: INTERNAL MEDICINE

## 2018-09-20 PROCEDURE — 99214 OFFICE O/P EST MOD 30 MIN: CPT | Performed by: INTERNAL MEDICINE

## 2018-09-20 RX ORDER — COLCHICINE 0.6 MG/1
0.6 TABLET ORAL 2 TIMES DAILY
Qty: 28 TABLET | Refills: 1 | Status: SHIPPED | OUTPATIENT
Start: 2018-09-20 | End: 2018-10-31 | Stop reason: SDUPTHER

## 2018-09-20 NOTE — PROGRESS NOTES
Subjective:     Encounter Date:08/31/2018      Patient ID: Sachin Tolliver is a 63 y.o. male.    Chief Complaint:  Chest Pain    Pertinent negatives include no dizziness.   His past medical history is significant for CAD.   Coronary Artery Disease   Presents for follow-up visit. Symptoms include chest pain. Pertinent negatives include no chest pressure, chest tightness or dizziness. The symptoms have been stable. Compliance with diet is good. Compliance with exercise is good. Compliance with medications is good.       63-year-old gentleman who presents today for reevaluation.  Patient has a prosthetic aortic valve as well as coronary artery bypass grafting ×1.  Clinically he is doing great gets a little bit lightheaded when he stands up too quickly.  He has remained on Lasix and potassium since his surgery.  He is enrolled in cardiac rehabilitation and doing well.    Review of Systems   Cardiovascular: Positive for chest pain.   Respiratory: Negative for chest tightness.    Neurological: Negative for dizziness.   All other systems reviewed and are negative.        ECG 12 Lead  Date/Time: 9/20/2018 2:37 PM  Performed by: EDIN DELANEY  Authorized by: EDIN DELANEY   Comparison: compared with previous ECG from 7/24/2018  Comparison to previous ECG: More ST elevation in the anterior lateral leads  Rhythm: sinus rhythm  ST Elevation: V2, V3, V4 and V5  Clinical impression: abnormal ECG               Objective:     Physical Exam   Constitutional: He is oriented to person, place, and time. He appears well-developed.   HENT:   Head: Normocephalic.   Eyes: Conjunctivae are normal.   Neck: Normal range of motion.   Cardiovascular: Normal rate, regular rhythm and normal heart sounds.    Pulmonary/Chest: Breath sounds normal.   Abdominal: Soft. Bowel sounds are normal.   Musculoskeletal: Normal range of motion. He exhibits no edema.   Neurological: He is alert and oriented to person, place, and time.   Skin: Skin  is warm and dry.   Psychiatric: He has a normal mood and affect. His behavior is normal.   Vitals reviewed.      Lab Review:       Assessment:          Diagnosis Plan   1. S/P aortic valve replacement with tissue     2. S/P CABG x 1     3. Dressler syndrome (CMS/HCC)            Plan:       1.  Status post aortic valve replacement.  We'll set him up for his initial postop echo.  2.  Coronary artery disease status post coronary artery bypass grafting.  Clinically doing well in rehabilitation.  3.  Dressler syndrome patient's EKG showed diffuse ST elevation consistent with Dressler syndrome.  Also his time course is pretty consistent initially having it improving and then coming back.  He had an echocardiogram done on Friday the did not show pericardial effusion and she was valve was functioning fine.  Since he been on high-dose NSAIDs for the last week with no improvement will change her over to colchicine.  We'll see if it improves in several weeks if it does not then will consider steroids.    Coronary Artery Disease  Assessment  • The patient has no angina    Plan  • Lifestyle modifications discussed include adhering to a heart healthy diet, avoidance of tobacco products, maintenance of a healthy weight, medication compliance, regular exercise and regular monitoring of cholesterol and blood pressure    Subjective - Objective  • Current antiplatelet therapy includes aspirin 81 mg

## 2018-09-21 ENCOUNTER — APPOINTMENT (OUTPATIENT)
Dept: CARDIAC REHAB | Facility: HOSPITAL | Age: 63
End: 2018-09-21

## 2018-09-24 ENCOUNTER — APPOINTMENT (OUTPATIENT)
Dept: CARDIAC REHAB | Facility: HOSPITAL | Age: 63
End: 2018-09-24

## 2018-09-26 ENCOUNTER — APPOINTMENT (OUTPATIENT)
Dept: CARDIAC REHAB | Facility: HOSPITAL | Age: 63
End: 2018-09-26

## 2018-09-26 ENCOUNTER — TELEPHONE (OUTPATIENT)
Dept: CARDIOLOGY | Facility: CLINIC | Age: 63
End: 2018-09-26

## 2018-09-26 NOTE — TELEPHONE ENCOUNTER
09/26/18  12:47 PM    Patient notified of echocardiogram results.    MITCHELL Ken  [facility]

## 2018-09-28 ENCOUNTER — APPOINTMENT (OUTPATIENT)
Dept: CARDIAC REHAB | Facility: HOSPITAL | Age: 63
End: 2018-09-28

## 2018-10-01 ENCOUNTER — APPOINTMENT (OUTPATIENT)
Dept: CARDIAC REHAB | Facility: HOSPITAL | Age: 63
End: 2018-10-01

## 2018-10-03 ENCOUNTER — APPOINTMENT (OUTPATIENT)
Dept: CARDIAC REHAB | Facility: HOSPITAL | Age: 63
End: 2018-10-03

## 2018-10-05 ENCOUNTER — APPOINTMENT (OUTPATIENT)
Dept: CARDIAC REHAB | Facility: HOSPITAL | Age: 63
End: 2018-10-05

## 2018-10-08 ENCOUNTER — APPOINTMENT (OUTPATIENT)
Dept: CARDIAC REHAB | Facility: HOSPITAL | Age: 63
End: 2018-10-08

## 2018-10-10 ENCOUNTER — APPOINTMENT (OUTPATIENT)
Dept: CARDIAC REHAB | Facility: HOSPITAL | Age: 63
End: 2018-10-10

## 2018-10-12 ENCOUNTER — APPOINTMENT (OUTPATIENT)
Dept: CARDIAC REHAB | Facility: HOSPITAL | Age: 63
End: 2018-10-12

## 2018-10-15 ENCOUNTER — APPOINTMENT (OUTPATIENT)
Dept: CARDIAC REHAB | Facility: HOSPITAL | Age: 63
End: 2018-10-15

## 2018-10-17 ENCOUNTER — APPOINTMENT (OUTPATIENT)
Dept: CARDIAC REHAB | Facility: HOSPITAL | Age: 63
End: 2018-10-17

## 2018-10-19 ENCOUNTER — APPOINTMENT (OUTPATIENT)
Dept: CARDIAC REHAB | Facility: HOSPITAL | Age: 63
End: 2018-10-19

## 2018-10-22 ENCOUNTER — APPOINTMENT (OUTPATIENT)
Dept: CARDIAC REHAB | Facility: HOSPITAL | Age: 63
End: 2018-10-22

## 2018-10-24 ENCOUNTER — APPOINTMENT (OUTPATIENT)
Dept: CARDIAC REHAB | Facility: HOSPITAL | Age: 63
End: 2018-10-24

## 2018-10-26 ENCOUNTER — APPOINTMENT (OUTPATIENT)
Dept: CARDIAC REHAB | Facility: HOSPITAL | Age: 63
End: 2018-10-26

## 2018-10-29 ENCOUNTER — APPOINTMENT (OUTPATIENT)
Dept: CARDIAC REHAB | Facility: HOSPITAL | Age: 63
End: 2018-10-29

## 2018-10-31 ENCOUNTER — APPOINTMENT (OUTPATIENT)
Dept: CARDIAC REHAB | Facility: HOSPITAL | Age: 63
End: 2018-10-31

## 2018-11-01 RX ORDER — COLCHICINE 0.6 MG/1
0.6 TABLET ORAL 2 TIMES DAILY
Qty: 56 TABLET | Refills: 1 | Status: SHIPPED | OUTPATIENT
Start: 2018-11-01 | End: 2019-10-04

## 2018-11-02 ENCOUNTER — APPOINTMENT (OUTPATIENT)
Dept: CARDIAC REHAB | Facility: HOSPITAL | Age: 63
End: 2018-11-02

## 2018-12-17 RX ORDER — ATORVASTATIN CALCIUM 40 MG/1
40 TABLET, FILM COATED ORAL NIGHTLY
Qty: 30 TABLET | Refills: 5 | Status: SHIPPED | OUTPATIENT
Start: 2018-12-17 | End: 2019-06-11 | Stop reason: SDUPTHER

## 2019-01-28 DIAGNOSIS — G43.109 MIGRAINE WITH AURA AND WITHOUT STATUS MIGRAINOSUS, NOT INTRACTABLE: ICD-10-CM

## 2019-01-29 RX ORDER — BUTALBITAL, ACETAMINOPHEN AND CAFFEINE 50; 325; 40 MG/1; MG/1; MG/1
1 TABLET ORAL EVERY 6 HOURS PRN
Qty: 25 TABLET | Refills: 1 | Status: SHIPPED | OUTPATIENT
Start: 2019-01-29 | End: 2021-04-23 | Stop reason: SDUPTHER

## 2019-02-11 ENCOUNTER — OFFICE VISIT (OUTPATIENT)
Dept: CARDIAC SURGERY | Facility: CLINIC | Age: 64
End: 2019-02-11

## 2019-02-11 VITALS
HEIGHT: 74 IN | HEART RATE: 60 BPM | BODY MASS INDEX: 31.06 KG/M2 | TEMPERATURE: 98.1 F | WEIGHT: 242 LBS | SYSTOLIC BLOOD PRESSURE: 132 MMHG | DIASTOLIC BLOOD PRESSURE: 87 MMHG | OXYGEN SATURATION: 99 % | RESPIRATION RATE: 20 BRPM

## 2019-02-11 DIAGNOSIS — Z95.1 S/P CABG X 1: ICD-10-CM

## 2019-02-11 DIAGNOSIS — Z95.3 S/P AORTIC VALVE REPLACEMENT WITH TISSUE: ICD-10-CM

## 2019-02-11 PROCEDURE — 99024 POSTOP FOLLOW-UP VISIT: CPT | Performed by: NURSE PRACTITIONER

## 2019-02-12 NOTE — PROGRESS NOTES
"CARDIOVASCULAR SURGERY FOLLOW-UP PROGRESS NOTE  Chief Complaint: post op        HPI:   Dear Milvia Maher MD and colleagues:    It was nice to see Sachin Tolliver in follow up 6 months  after surgery.  As you know, he is a 63 y.o. male with CAD and aortic stenosis who underwent s/p AVR CABGx1. He did well postoperatively and continues to do well. He comes in today complaining of nothing.  His activity level has been good.       Physical Exam:         /87   Pulse 60   Temp 98.1 °F (36.7 °C)   Resp 20   Ht 188 cm (74\")   Wt 110 kg (242 lb)   SpO2 99%   BMI 31.07 kg/m²   Heart:  regular rate and rhythm  Lungs:  clear to auscultation bilaterally  Extremities:  no edema  Incision(s):  sternum stable    Assessment/Plan:     S/P CABG and AVR. Overall, he is doing well.    No significant post-op complications    OK to drive if not taking narcotic pain medicine  OK to begin cardiac rehab  Follow-up as scheduled with cardiology  Follow-up as scheduled with PCP    No restrictions of activity.      Thank you for allowing me to participate in the care of your   patient.  Regards,  MITCHELL Solis      "

## 2019-06-13 RX ORDER — ATORVASTATIN CALCIUM 40 MG/1
40 TABLET, FILM COATED ORAL NIGHTLY
Qty: 30 TABLET | Refills: 5 | Status: SHIPPED | OUTPATIENT
Start: 2019-06-13 | End: 2020-01-09

## 2019-07-11 RX ORDER — METOPROLOL TARTRATE 50 MG/1
50 TABLET, FILM COATED ORAL EVERY 12 HOURS SCHEDULED
Qty: 60 TABLET | Refills: 10 | Status: SHIPPED | OUTPATIENT
Start: 2019-07-11 | End: 2020-05-29 | Stop reason: SDUPTHER

## 2019-07-11 RX ORDER — METOPROLOL TARTRATE 50 MG/1
50 TABLET, FILM COATED ORAL EVERY 12 HOURS SCHEDULED
Qty: 60 TABLET | Refills: 10 | Status: CANCELLED | OUTPATIENT
Start: 2019-07-11

## 2019-07-15 ENCOUNTER — TELEPHONE (OUTPATIENT)
Dept: CARDIOLOGY | Facility: CLINIC | Age: 64
End: 2019-07-15

## 2019-07-15 NOTE — TELEPHONE ENCOUNTER
Pt wants to know if he still needs to take ASA 81 mg?  He had heard that not everyone really needs to be on this.  Please advise.    Thanks,  Modesta

## 2019-08-15 DIAGNOSIS — F43.21 SITUATIONAL DEPRESSION: ICD-10-CM

## 2019-08-15 RX ORDER — ESCITALOPRAM OXALATE 10 MG/1
10 TABLET ORAL DAILY
Qty: 90 TABLET | Refills: 3 | Status: SHIPPED | OUTPATIENT
Start: 2019-08-15 | End: 2020-08-05 | Stop reason: SDUPTHER

## 2019-10-04 ENCOUNTER — OFFICE VISIT (OUTPATIENT)
Dept: FAMILY MEDICINE CLINIC | Facility: CLINIC | Age: 64
End: 2019-10-04

## 2019-10-04 VITALS
DIASTOLIC BLOOD PRESSURE: 78 MMHG | SYSTOLIC BLOOD PRESSURE: 120 MMHG | OXYGEN SATURATION: 98 % | BODY MASS INDEX: 32.11 KG/M2 | HEART RATE: 62 BPM | HEIGHT: 74 IN | WEIGHT: 250.2 LBS

## 2019-10-04 DIAGNOSIS — E66.9 OBESITY (BMI 30.0-34.9): ICD-10-CM

## 2019-10-04 DIAGNOSIS — E78.2 MIXED HYPERLIPIDEMIA: ICD-10-CM

## 2019-10-04 DIAGNOSIS — D58.9 HEREDITARY HEMOLYTIC ANEMIA (HCC): ICD-10-CM

## 2019-10-04 DIAGNOSIS — Z01.89 ROUTINE LAB DRAW: ICD-10-CM

## 2019-10-04 DIAGNOSIS — Z12.11 ENCOUNTER FOR SCREENING FOR MALIGNANT NEOPLASM OF COLON: ICD-10-CM

## 2019-10-04 DIAGNOSIS — I10 BENIGN ESSENTIAL HYPERTENSION: Primary | ICD-10-CM

## 2019-10-04 PROBLEM — Q23.1 AORTIC STENOSIS DUE TO BICUSPID AORTIC VALVE: Status: RESOLVED | Noted: 2018-06-20 | Resolved: 2019-10-04

## 2019-10-04 PROBLEM — I35.0 AORTIC STENOSIS DUE TO BICUSPID AORTIC VALVE: Status: RESOLVED | Noted: 2018-06-20 | Resolved: 2019-10-04

## 2019-10-04 PROBLEM — E66.811 OBESITY (BMI 30.0-34.9): Status: ACTIVE | Noted: 2018-06-20

## 2019-10-04 PROBLEM — Q23.0 AORTIC STENOSIS DUE TO BICUSPID AORTIC VALVE: Status: RESOLVED | Noted: 2018-06-20 | Resolved: 2019-10-04

## 2019-10-04 PROBLEM — I24.1 DRESSLER SYNDROME: Status: RESOLVED | Noted: 2018-09-20 | Resolved: 2019-10-04

## 2019-10-04 PROBLEM — Q23.81 AORTIC STENOSIS DUE TO BICUSPID AORTIC VALVE: Status: RESOLVED | Noted: 2018-06-20 | Resolved: 2019-10-04

## 2019-10-04 LAB
ERYTHROCYTE [DISTWIDTH] IN BLOOD BY AUTOMATED COUNT: 13.2 % (ref 12.3–15.4)
HCT VFR BLD AUTO: 42.5 % (ref 37.5–51)
HGB BLD-MCNC: 14.2 G/DL (ref 13–17.7)
MCH RBC QN AUTO: 30.2 PG (ref 26.6–33)
MCHC RBC AUTO-ENTMCNC: 33.4 G/DL (ref 31.5–35.7)
MCV RBC AUTO: 90.4 FL (ref 79–97)
PLATELET # BLD AUTO: 225 10*3/MM3 (ref 140–450)
RBC # BLD AUTO: 4.7 10*6/MM3 (ref 4.14–5.8)
WBC # BLD AUTO: 5.68 10*3/MM3 (ref 3.4–10.8)

## 2019-10-04 PROCEDURE — 99214 OFFICE O/P EST MOD 30 MIN: CPT | Performed by: FAMILY MEDICINE

## 2019-10-04 NOTE — ASSESSMENT & PLAN NOTE
Kelvin 10/4/2019  BP is controlled well. He will recheck in six months. He will continue present meds.

## 2019-10-04 NOTE — PROGRESS NOTES
ASSESSMENT AND PLAN  Problem List Items Addressed This Visit        Cardiovascular and Mediastinum    Benign essential hypertension - Primary    Overview                Current Assessment & Plan     [facility] 10/4/2019  BP is controlled well. He will recheck in six months. He will continue present meds.           Hyperlipidemia    Overview     [facility] 10/4/2019 Labs are drawn today             Hematopoietic and Hemostatic    Hereditary hemolytic anemia (CMS/HCC)    Overview     Description: He's had only one episode of hemolytic anemia. His father had a similar problem. His dad had a lot of issues with that. He used to be jaundiced.         Current Assessment & Plan     [facility] 10/4/2019  Labs are drawn today.               Other    Obesity (BMI 30.0-34.9)    Current Assessment & Plan     [facility] 10/4/2019  He is working out regularly. Trying to eat well.            Other Visit Diagnoses     Routine lab draw        Relevant Orders    Lipid Panel With LDL / HDL Ratio    Hepatitis C Antibody    Comprehensive Metabolic Panel    CBC (No Diff)        Return in about 6 months (around 4/4/2020).  Patient Instructions   Try some Eucerin cream (in the tub).         SUBJECTIVE  Sachin Tolliver is a 64 y.o. male being seen in our office today for red spots on shins (that come and go) and Hypertension               Social History  He  reports that he has never smoked. He has never used smokeless tobacco. He reports that he drinks about 2.4 oz of alcohol per week. He reports that he does not use drugs.    History of the Present Illness   HPI Patient is here for follow-up of hypertension. He is exercising and is adherent to a low-salt diet. Patient does not check BP at home.. Patient denies chest pain and dyspnea. Cardiovascular risk factors: advanced age (older than 55 for men, 65 for women), dyslipidemia, hypertension, male gender and obesity (BMI >= 30 kg/m2). Use of agents associated with hypertension: none. History of  target organ damage: none. He is compliant with meds.   Significant Past History  The following portions of the patient's history were reviewed and updated as appropriate:PMHroutine: Social history , Allergies, Current Medications, Active Problem List and Health Maintenance    Review of Systems   Constitutional: Negative for activity change, appetite change, chills, fatigue, fever and unexpected weight change.   HENT: Negative for congestion, ear pain, hearing loss, mouth sores, nosebleeds, rhinorrhea and sore throat.    Eyes: Negative for pain and visual disturbance.   Respiratory: Negative for cough, shortness of breath and wheezing.    Cardiovascular: Negative for chest pain, palpitations and leg swelling.   Gastrointestinal: Negative for abdominal distention, abdominal pain, blood in stool, constipation, diarrhea, nausea and vomiting.   Endocrine: Negative for cold intolerance and heat intolerance.   Genitourinary: Negative for difficulty urinating, discharge, dysuria, frequency, hematuria and urgency.   Musculoskeletal: Negative for back pain and joint swelling.   Skin: Negative for rash and wound.   Neurological: Negative for dizziness, weakness, numbness and headaches.   Hematological: Does not bruise/bleed easily.   Psychiatric/Behavioral: Negative for confusion, dysphoric mood, sleep disturbance and suicidal ideas. The patient is not nervous/anxious.    I have reviewed the ROS as documented by the MA. Milvia Briceno MD      OBJECTIVE   Vital Signs          BP Readings from Last 1 Encounters:   10/04/19 120/78     Wt Readings from Last 3 Encounters:   10/04/19 113 kg (250 lb 3.2 oz)   02/11/19 110 kg (242 lb)   09/20/18 114 kg (251 lb)   Body mass index is 32.12 kg/m².     Physical Exam   Constitutional: He is oriented to person, place, and time. Vital signs are normal. He appears well-developed and well-nourished. No distress.   HENT:   Head: Normocephalic.   Cardiovascular: Normal rate, regular rhythm  and normal heart sounds.   Pulmonary/Chest: Effort normal.   Neurological: He is alert and oriented to person, place, and time. Gait normal.   Psychiatric: He has a normal mood and affect. His behavior is normal. Judgment and thought content normal.   Vitals reviewed.    Data Reviewed

## 2019-10-05 LAB
ALBUMIN SERPL-MCNC: 4.5 G/DL (ref 3.5–5.2)
ALBUMIN/GLOB SERPL: 1.7 G/DL
ALP SERPL-CCNC: 78 U/L (ref 39–117)
ALT SERPL-CCNC: 22 U/L (ref 1–41)
AST SERPL-CCNC: 25 U/L (ref 1–40)
BILIRUB SERPL-MCNC: 0.6 MG/DL (ref 0.2–1.2)
BUN SERPL-MCNC: 16 MG/DL (ref 8–23)
BUN/CREAT SERPL: 13.6 (ref 7–25)
CALCIUM SERPL-MCNC: 9.7 MG/DL (ref 8.6–10.5)
CHLORIDE SERPL-SCNC: 103 MMOL/L (ref 98–107)
CHOLEST SERPL-MCNC: 134 MG/DL (ref 0–200)
CO2 SERPL-SCNC: 28 MMOL/L (ref 22–29)
CREAT SERPL-MCNC: 1.18 MG/DL (ref 0.76–1.27)
GLOBULIN SER CALC-MCNC: 2.7 GM/DL
GLUCOSE SERPL-MCNC: 77 MG/DL (ref 65–99)
HCV AB S/CO SERPL IA: <0.1 S/CO RATIO (ref 0–0.9)
HDLC SERPL-MCNC: 40 MG/DL (ref 40–60)
LDLC SERPL CALC-MCNC: 73 MG/DL (ref 0–100)
LDLC/HDLC SERPL: 1.83 {RATIO}
POTASSIUM SERPL-SCNC: 4.7 MMOL/L (ref 3.5–5.2)
PROT SERPL-MCNC: 7.2 G/DL (ref 6–8.5)
SODIUM SERPL-SCNC: 140 MMOL/L (ref 136–145)
TRIGL SERPL-MCNC: 104 MG/DL (ref 0–150)
VLDLC SERPL CALC-MCNC: 20.8 MG/DL

## 2019-10-22 ENCOUNTER — PREP FOR SURGERY (OUTPATIENT)
Dept: OTHER | Facility: HOSPITAL | Age: 64
End: 2019-10-22

## 2019-10-22 DIAGNOSIS — Z12.11 SCREEN FOR COLON CANCER: ICD-10-CM

## 2019-10-22 DIAGNOSIS — Z86.69 HX OF MIGRAINES: ICD-10-CM

## 2019-10-22 DIAGNOSIS — I51.9 HEART DISEASE: ICD-10-CM

## 2019-10-22 DIAGNOSIS — I10 HYPERTENSION, UNSPECIFIED TYPE: Primary | ICD-10-CM

## 2019-12-23 ENCOUNTER — OFFICE VISIT (OUTPATIENT)
Dept: FAMILY MEDICINE CLINIC | Facility: CLINIC | Age: 64
End: 2019-12-23

## 2019-12-23 ENCOUNTER — TELEPHONE (OUTPATIENT)
Dept: FAMILY MEDICINE CLINIC | Facility: CLINIC | Age: 64
End: 2019-12-23

## 2019-12-23 VITALS
BODY MASS INDEX: 33.24 KG/M2 | HEART RATE: 75 BPM | RESPIRATION RATE: 16 BRPM | DIASTOLIC BLOOD PRESSURE: 82 MMHG | OXYGEN SATURATION: 98 % | HEIGHT: 74 IN | SYSTOLIC BLOOD PRESSURE: 122 MMHG | WEIGHT: 259 LBS

## 2019-12-23 DIAGNOSIS — I35.8 AORTIC HEART MURMUR ON EXAMINATION: ICD-10-CM

## 2019-12-23 DIAGNOSIS — R07.1 CHEST PAIN ON BREATHING: Primary | ICD-10-CM

## 2019-12-23 PROCEDURE — 93000 ELECTROCARDIOGRAM COMPLETE: CPT | Performed by: FAMILY MEDICINE

## 2019-12-23 PROCEDURE — 99214 OFFICE O/P EST MOD 30 MIN: CPT | Performed by: FAMILY MEDICINE

## 2019-12-23 NOTE — TELEPHONE ENCOUNTER
----- Message from Milvia Briceno MD sent at 12/23/2019  5:04 PM EST -----  Regarding: call patient  Tell him Dr. Irene recommended we go ahead and repeat his echo.     I've ordered it.    KHRIS

## 2019-12-23 NOTE — PROGRESS NOTES
ASSESSMENT AND PLAN    Problem List Items Addressed This Visit     None      Visit Diagnoses     Chest pain on breathing    -  Primary    Relevant Orders    D-dimer, Quantitative    CBC (No Diff)    Sedimentation Rate    Comprehensive Metabolic Panel             Return for Dependent on test results.  Patient was given instructions and counseling regarding his condition or for health maintenance advice. Please see specific information pulled into the AVS by me.          SUBJECTIVE  Sachin Tolliver is a 64 y.o. male being seen in our office today for Sore Throat               Social History  He  reports that he has never smoked. He has never used smokeless tobacco. He reports that he drinks about 4.0 standard drinks of alcohol per week. He reports that he does not use drugs.    History of the Present Illness   HPI He has had 4-5 days of an odd sensation of discomfort in his chest. Worse when he takes a big deep breath. He did have some muscular discomfort in his right shoulder last week. No respiratory symptoms. Some sore throat that occurs with the deep breath as well. It reminds him a little of when he had Dressler's syndrome. No fever. Not every breath at all. No recent cold symptoms.   Significant Past History  The following portions of the patient's history were reviewed and updated as appropriate:PMHroutine: Social history , Allergies, Current Medications, Active Problem List and Health Maintenance    Review of Systems   Constitutional: Negative for activity change, appetite change, chills, fatigue, fever and unexpected weight change.   HENT: Negative for congestion, ear pain, hearing loss, mouth sores, nosebleeds, rhinorrhea and sore throat.    Eyes: Negative for pain and visual disturbance.   Respiratory: Negative for cough, shortness of breath and wheezing.    Cardiovascular: Negative for chest pain, palpitations and leg swelling.   Gastrointestinal: Negative for abdominal distention, abdominal pain,  blood in stool, constipation, diarrhea, nausea and vomiting.   Endocrine: Negative for cold intolerance and heat intolerance.   Genitourinary: Negative for difficulty urinating, discharge, dysuria, frequency, hematuria and urgency.   Musculoskeletal: Negative for back pain and joint swelling.   Skin: Negative for rash and wound.   Neurological: Negative for dizziness, weakness, numbness and headaches.   Hematological: Does not bruise/bleed easily.   Psychiatric/Behavioral: Negative for confusion, dysphoric mood, sleep disturbance and suicidal ideas. The patient is not nervous/anxious.    I have reviewed the ROS as documented by the MA. Milvia Briceno MD      OBJECTIVE   Vital Signs          BP Readings from Last 1 Encounters:   12/23/19 122/82     Wt Readings from Last 3 Encounters:   12/23/19 117 kg (259 lb)   10/04/19 113 kg (250 lb 3.2 oz)   02/11/19 110 kg (242 lb)   Body mass index is 33.25 kg/m².     Physical Exam   Constitutional: He is oriented to person, place, and time. Vital signs are normal. He appears well-developed and well-nourished. No distress.   HENT:   Head: Normocephalic.   Cardiovascular: Normal rate and regular rhythm.   Murmur (2/6 ALVARO in LUSB) heard.  Pulmonary/Chest: Effort normal and breath sounds normal.   Neurological: He is alert and oriented to person, place, and time. Gait normal.   Psychiatric: He has a normal mood and affect. His behavior is normal. Judgment and thought content normal.   Vitals reviewed.    Data Reviewed       ECG 12 Lead  Date/Time: 12/23/2019 4:46 PM  Performed by: Milvia Briceno MD  Authorized by: Milvia Briceno MD   Comparison: compared with previous ECG from 9/20/2019  Similar to previous ECG  Comparison to previous ECG: But improved.   Rhythm: sinus rhythm and sinus bradycardia  Rate: normal  Conduction: conduction normal  ST Segments: ST segments normal  T Waves: T waves normal  QRS axis: normal  Other: no other findings    Clinical impression: normal  ECG and non-specific ECG  Comments: Improved EKG from 2018 -- st-elevation resolved.

## 2019-12-24 LAB
ALBUMIN SERPL-MCNC: 4.3 G/DL (ref 3.6–4.8)
ALBUMIN/GLOB SERPL: 1.8 {RATIO} (ref 1.2–2.2)
ALP SERPL-CCNC: 83 IU/L (ref 39–117)
ALT SERPL-CCNC: 19 IU/L (ref 0–44)
AST SERPL-CCNC: 21 IU/L (ref 0–40)
BILIRUB SERPL-MCNC: 0.5 MG/DL (ref 0–1.2)
BUN SERPL-MCNC: 17 MG/DL (ref 8–27)
BUN/CREAT SERPL: 15 (ref 10–24)
CALCIUM SERPL-MCNC: 9.4 MG/DL (ref 8.6–10.2)
CHLORIDE SERPL-SCNC: 102 MMOL/L (ref 96–106)
CO2 SERPL-SCNC: 24 MMOL/L (ref 20–29)
CREAT SERPL-MCNC: 1.15 MG/DL (ref 0.76–1.27)
D DIMER PPP FEU-MCNC: 0.99 MG/L FEU (ref 0–0.49)
ERYTHROCYTE [DISTWIDTH] IN BLOOD BY AUTOMATED COUNT: 13.4 % (ref 12.3–15.4)
ERYTHROCYTE [SEDIMENTATION RATE] IN BLOOD BY WESTERGREN METHOD: 6 MM/HR (ref 0–30)
GLOBULIN SER CALC-MCNC: 2.4 G/DL (ref 1.5–4.5)
GLUCOSE SERPL-MCNC: 88 MG/DL (ref 65–99)
HCT VFR BLD AUTO: 38.6 % (ref 37.5–51)
HGB BLD-MCNC: 13.4 G/DL (ref 13–17.7)
MCH RBC QN AUTO: 29.9 PG (ref 26.6–33)
MCHC RBC AUTO-ENTMCNC: 34.7 G/DL (ref 31.5–35.7)
MCV RBC AUTO: 86 FL (ref 79–97)
PLATELET # BLD AUTO: 229 X10E3/UL (ref 150–450)
POTASSIUM SERPL-SCNC: 4.6 MMOL/L (ref 3.5–5.2)
PROT SERPL-MCNC: 6.7 G/DL (ref 6–8.5)
RBC # BLD AUTO: 4.48 X10E6/UL (ref 4.14–5.8)
SODIUM SERPL-SCNC: 140 MMOL/L (ref 134–144)
WBC # BLD AUTO: 6.4 X10E3/UL (ref 3.4–10.8)

## 2019-12-26 DIAGNOSIS — R07.1 CHEST PAIN ON BREATHING: Primary | ICD-10-CM

## 2019-12-26 DIAGNOSIS — R79.89 D-DIMER, ELEVATED: ICD-10-CM

## 2019-12-27 ENCOUNTER — HOSPITAL ENCOUNTER (OUTPATIENT)
Dept: CT IMAGING | Facility: HOSPITAL | Age: 64
Discharge: HOME | End: 2019-12-27
Admitting: FAMILY MEDICINE

## 2019-12-27 DIAGNOSIS — R79.89 D-DIMER, ELEVATED: ICD-10-CM

## 2019-12-27 DIAGNOSIS — R07.1 CHEST PAIN ON BREATHING: ICD-10-CM

## 2019-12-27 LAB — CREAT BLDA-MCNC: 1.1 MG/DL (ref 0.6–1.3)

## 2019-12-27 PROCEDURE — 71275 CT ANGIOGRAPHY CHEST: CPT

## 2019-12-27 PROCEDURE — 0 IOPAMIDOL PER 1 ML: Performed by: FAMILY MEDICINE

## 2019-12-27 PROCEDURE — 82565 ASSAY OF CREATININE: CPT

## 2019-12-27 RX ADMIN — IOPAMIDOL 95 ML: 755 INJECTION, SOLUTION INTRAVENOUS at 10:14

## 2019-12-27 NOTE — NURSING NOTE
Dr. Hernandez read CTA PE Chest as negative and this was called to Dr. Briceno. Stated patient may go, IV removed and ambulated out to car per self.

## 2020-01-09 ENCOUNTER — HOSPITAL ENCOUNTER (OUTPATIENT)
Dept: CARDIOLOGY | Facility: HOSPITAL | Age: 65
Discharge: HOME | End: 2020-01-09
Admitting: FAMILY MEDICINE

## 2020-01-09 VITALS
BODY MASS INDEX: 33.24 KG/M2 | SYSTOLIC BLOOD PRESSURE: 138 MMHG | HEIGHT: 74 IN | HEART RATE: 65 BPM | WEIGHT: 259 LBS | OXYGEN SATURATION: 97 % | DIASTOLIC BLOOD PRESSURE: 75 MMHG

## 2020-01-09 DIAGNOSIS — I35.8 AORTIC HEART MURMUR ON EXAMINATION: ICD-10-CM

## 2020-01-09 DIAGNOSIS — R07.1 CHEST PAIN ON BREATHING: ICD-10-CM

## 2020-01-09 PROCEDURE — 93306 TTE W/DOPPLER COMPLETE: CPT | Performed by: INTERNAL MEDICINE

## 2020-01-09 PROCEDURE — 93306 TTE W/DOPPLER COMPLETE: CPT

## 2020-01-09 PROCEDURE — 25010000002 PERFLUTREN (DEFINITY) 8.476 MG IN SODIUM CHLORIDE (PF) 0.9 % 10 ML INJECTION: Performed by: FAMILY MEDICINE

## 2020-01-09 RX ORDER — ATORVASTATIN CALCIUM 40 MG/1
40 TABLET, FILM COATED ORAL NIGHTLY
Qty: 30 TABLET | Refills: 5 | Status: SHIPPED | OUTPATIENT
Start: 2020-01-09 | End: 2020-06-30 | Stop reason: SDUPTHER

## 2020-01-09 RX ADMIN — PERFLUTREN 2 ML: 6.52 INJECTION, SUSPENSION INTRAVENOUS at 08:16

## 2020-01-10 LAB
AORTIC ARCH: 2.5 CM
AORTIC DIMENSIONLESS INDEX: 0.3 (DI)
AORTIC ROOT ANNULUS: 2.4 CM
ASCENDING AORTA: 3.8 CM
BH CV ECHO MEAS - AO MAX PG (FULL): 20.2 MMHG
BH CV ECHO MEAS - AO MAX PG: 23.2 MMHG
BH CV ECHO MEAS - AO MEAN PG (FULL): 13 MMHG
BH CV ECHO MEAS - AO MEAN PG: 15 MMHG
BH CV ECHO MEAS - AO ROOT AREA (BSA CORRECTED): 1.5
BH CV ECHO MEAS - AO ROOT AREA: 10.8 CM^2
BH CV ECHO MEAS - AO ROOT DIAM: 3.7 CM
BH CV ECHO MEAS - AO V2 MAX: 241 CM/SEC
BH CV ECHO MEAS - AO V2 MEAN: 183 CM/SEC
BH CV ECHO MEAS - AO V2 VTI: 57.5 CM
BH CV ECHO MEAS - ASC AORTA: 3.8 CM
BH CV ECHO MEAS - AVA(I,A): 1.2 CM^2
BH CV ECHO MEAS - AVA(I,D): 1.2 CM^2
BH CV ECHO MEAS - AVA(V,A): 1.3 CM^2
BH CV ECHO MEAS - AVA(V,D): 1.3 CM^2
BH CV ECHO MEAS - BSA(HAYCOCK): 2.5 M^2
BH CV ECHO MEAS - BSA: 2.4 M^2
BH CV ECHO MEAS - BZI_BMI: 33.3 KILOGRAMS/M^2
BH CV ECHO MEAS - BZI_METRIC_HEIGHT: 188 CM
BH CV ECHO MEAS - BZI_METRIC_WEIGHT: 117.5 KG
BH CV ECHO MEAS - EDV(MOD-SP2): 125 ML
BH CV ECHO MEAS - EDV(MOD-SP4): 145 ML
BH CV ECHO MEAS - EDV(TEICH): 83.1 ML
BH CV ECHO MEAS - EF(CUBED): 62.5 %
BH CV ECHO MEAS - EF(MOD-BP): 57 %
BH CV ECHO MEAS - EF(MOD-SP2): 56.8 %
BH CV ECHO MEAS - EF(MOD-SP4): 57.9 %
BH CV ECHO MEAS - EF(TEICH): 54.4 %
BH CV ECHO MEAS - ESV(MOD-SP2): 54 ML
BH CV ECHO MEAS - ESV(MOD-SP4): 61 ML
BH CV ECHO MEAS - ESV(TEICH): 37.9 ML
BH CV ECHO MEAS - FS: 27.9 %
BH CV ECHO MEAS - IVS/LVPW: 0.93
BH CV ECHO MEAS - IVSD: 1.3 CM
BH CV ECHO MEAS - LAT PEAK E' VEL: 9 CM/SEC
BH CV ECHO MEAS - LV DIASTOLIC VOL/BSA (35-75): 59.8 ML/M^2
BH CV ECHO MEAS - LV MASS(C)D: 219.8 GRAMS
BH CV ECHO MEAS - LV MASS(C)DI: 90.6 GRAMS/M^2
BH CV ECHO MEAS - LV MAX PG: 3.1 MMHG
BH CV ECHO MEAS - LV MEAN PG: 2 MMHG
BH CV ECHO MEAS - LV SYSTOLIC VOL/BSA (12-30): 25.1 ML/M^2
BH CV ECHO MEAS - LV V1 MAX: 87.5 CM/SEC
BH CV ECHO MEAS - LV V1 MEAN: 63.1 CM/SEC
BH CV ECHO MEAS - LV V1 VTI: 20 CM
BH CV ECHO MEAS - LVIDD: 4.3 CM
BH CV ECHO MEAS - LVIDS: 3.1 CM
BH CV ECHO MEAS - LVLD AP2: 8.3 CM
BH CV ECHO MEAS - LVLD AP4: 8.2 CM
BH CV ECHO MEAS - LVLS AP2: 7.4 CM
BH CV ECHO MEAS - LVLS AP4: 7.3 CM
BH CV ECHO MEAS - LVOT AREA (M): 3.5 CM^2
BH CV ECHO MEAS - LVOT AREA: 3.5 CM^2
BH CV ECHO MEAS - LVOT DIAM: 2.1 CM
BH CV ECHO MEAS - LVPWD: 1.4 CM
BH CV ECHO MEAS - MED PEAK E' VEL: 6 CM/SEC
BH CV ECHO MEAS - MV A DUR: 0.11 SEC
BH CV ECHO MEAS - MV A MAX VEL: 49.3 CM/SEC
BH CV ECHO MEAS - MV DEC SLOPE: 354 CM/SEC^2
BH CV ECHO MEAS - MV DEC TIME: 0.23 SEC
BH CV ECHO MEAS - MV E MAX VEL: 41.1 CM/SEC
BH CV ECHO MEAS - MV E/A: 0.83
BH CV ECHO MEAS - MV MAX PG: 1.7 MMHG
BH CV ECHO MEAS - MV MEAN PG: 1 MMHG
BH CV ECHO MEAS - MV P1/2T MAX VEL: 61.3 CM/SEC
BH CV ECHO MEAS - MV P1/2T: 50.7 MSEC
BH CV ECHO MEAS - MV V2 MAX: 65.5 CM/SEC
BH CV ECHO MEAS - MV V2 MEAN: 45.6 CM/SEC
BH CV ECHO MEAS - MV V2 VTI: 25.1 CM
BH CV ECHO MEAS - MVA P1/2T LCG: 3.6 CM^2
BH CV ECHO MEAS - MVA(P1/2T): 4.3 CM^2
BH CV ECHO MEAS - MVA(VTI): 2.8 CM^2
BH CV ECHO MEAS - PA ACC TIME: 0.08 SEC
BH CV ECHO MEAS - PA MAX PG (FULL): 9.6 MMHG
BH CV ECHO MEAS - PA MAX PG: 11 MMHG
BH CV ECHO MEAS - PA PR(ACCEL): 43.5 MMHG
BH CV ECHO MEAS - PA V2 MAX: 166 CM/SEC
BH CV ECHO MEAS - PULM A REVS DUR: 0.06 SEC
BH CV ECHO MEAS - PULM A REVS VEL: 21.6 CM/SEC
BH CV ECHO MEAS - PULM DIAS VEL: 40.8 CM/SEC
BH CV ECHO MEAS - PULM S/D: 1.2
BH CV ECHO MEAS - PULM SYS VEL: 48.2 CM/SEC
BH CV ECHO MEAS - PVA(V,A): 1.3 CM^2
BH CV ECHO MEAS - PVA(V,D): 1.3 CM^2
BH CV ECHO MEAS - QP/QS: 0.8
BH CV ECHO MEAS - RAP SYSTOLE: 3 MMHG
BH CV ECHO MEAS - RV MAX PG: 1.4 MMHG
BH CV ECHO MEAS - RV MEAN PG: 1 MMHG
BH CV ECHO MEAS - RV V1 MAX: 58.7 CM/SEC
BH CV ECHO MEAS - RV V1 MEAN: 40.6 CM/SEC
BH CV ECHO MEAS - RV V1 VTI: 14.5 CM
BH CV ECHO MEAS - RVOT AREA: 3.8 CM^2
BH CV ECHO MEAS - RVOT DIAM: 2.2 CM
BH CV ECHO MEAS - RVSP: 24 MMHG
BH CV ECHO MEAS - SI(AO): 254.9 ML/M^2
BH CV ECHO MEAS - SI(CUBED): 20.5 ML/M^2
BH CV ECHO MEAS - SI(LVOT): 28.6 ML/M^2
BH CV ECHO MEAS - SI(MOD-SP2): 29.3 ML/M^2
BH CV ECHO MEAS - SI(MOD-SP4): 34.6 ML/M^2
BH CV ECHO MEAS - SI(TEICH): 18.6 ML/M^2
BH CV ECHO MEAS - SUP REN AO DIAM: 2 CM
BH CV ECHO MEAS - SV(AO): 618.2 ML
BH CV ECHO MEAS - SV(CUBED): 49.7 ML
BH CV ECHO MEAS - SV(LVOT): 69.3 ML
BH CV ECHO MEAS - SV(MOD-SP2): 71 ML
BH CV ECHO MEAS - SV(MOD-SP4): 84 ML
BH CV ECHO MEAS - SV(RVOT): 55.1 ML
BH CV ECHO MEAS - SV(TEICH): 45.2 ML
BH CV ECHO MEAS - TAPSE (>1.6): 2.6 CM2
BH CV ECHO MEAS - TR MAX VEL: 228 CM/SEC
BH CV ECHO MEASUREMENTS AVERAGE E/E' RATIO: 5.48
BH CV VAS BP RIGHT ARM: NORMAL MMHG
BH CV XLRA - RV BASE: 2.9 CM
BH CV XLRA - RV LENGTH: 8.2 CM
BH CV XLRA - RV MID: 3.8 CM
BH CV XLRA - TDI S': 9 CM/SEC
LEFT ATRIUM VOLUME INDEX: 22 ML/M2
LV EF 2D ECHO EST: 57 %
MAXIMAL PREDICTED HEART RATE: 158 BPM
SINUS: 3.5 CM
STJ: 3.9 CM
STRESS TARGET HR: 134 BPM

## 2020-02-10 ENCOUNTER — ANESTHESIA EVENT (OUTPATIENT)
Dept: GASTROENTEROLOGY | Facility: HOSPITAL | Age: 65
End: 2020-02-10

## 2020-02-10 ENCOUNTER — HOSPITAL ENCOUNTER (OUTPATIENT)
Facility: HOSPITAL | Age: 65
Setting detail: HOSPITAL OUTPATIENT SURGERY
Discharge: HOME | End: 2020-02-10
Attending: SURGERY | Admitting: SURGERY

## 2020-02-10 ENCOUNTER — ANESTHESIA (OUTPATIENT)
Dept: GASTROENTEROLOGY | Facility: HOSPITAL | Age: 65
End: 2020-02-10

## 2020-02-10 VITALS
WEIGHT: 252.38 LBS | HEART RATE: 53 BPM | BODY MASS INDEX: 32.39 KG/M2 | OXYGEN SATURATION: 96 % | HEIGHT: 74 IN | RESPIRATION RATE: 16 BRPM | DIASTOLIC BLOOD PRESSURE: 88 MMHG | TEMPERATURE: 98.1 F | SYSTOLIC BLOOD PRESSURE: 116 MMHG

## 2020-02-10 PROCEDURE — 25010000002 PROPOFOL 10 MG/ML EMULSION: Performed by: ANESTHESIOLOGY

## 2020-02-10 PROCEDURE — 45378 DIAGNOSTIC COLONOSCOPY: CPT | Performed by: SURGERY

## 2020-02-10 PROCEDURE — 25010000002 GLUCAGON (RDNA) PER 1 MG: Performed by: SURGERY

## 2020-02-10 RX ORDER — SODIUM CHLORIDE, SODIUM LACTATE, POTASSIUM CHLORIDE, CALCIUM CHLORIDE 600; 310; 30; 20 MG/100ML; MG/100ML; MG/100ML; MG/100ML
1000 INJECTION, SOLUTION INTRAVENOUS CONTINUOUS
Status: DISCONTINUED | OUTPATIENT
Start: 2020-02-10 | End: 2020-02-10 | Stop reason: HOSPADM

## 2020-02-10 RX ORDER — PROPOFOL 10 MG/ML
VIAL (ML) INTRAVENOUS AS NEEDED
Status: DISCONTINUED | OUTPATIENT
Start: 2020-02-10 | End: 2020-02-10 | Stop reason: SURG

## 2020-02-10 RX ORDER — PROPOFOL 10 MG/ML
VIAL (ML) INTRAVENOUS CONTINUOUS PRN
Status: DISCONTINUED | OUTPATIENT
Start: 2020-02-10 | End: 2020-02-10 | Stop reason: SURG

## 2020-02-10 RX ORDER — LIDOCAINE HYDROCHLORIDE 20 MG/ML
INJECTION, SOLUTION INFILTRATION; PERINEURAL AS NEEDED
Status: DISCONTINUED | OUTPATIENT
Start: 2020-02-10 | End: 2020-02-10 | Stop reason: SURG

## 2020-02-10 RX ADMIN — PROPOFOL 140 MCG/KG/MIN: 10 INJECTION, EMULSION INTRAVENOUS at 07:35

## 2020-02-10 RX ADMIN — PROPOFOL 125 MG: 10 INJECTION, EMULSION INTRAVENOUS at 07:35

## 2020-02-10 RX ADMIN — SODIUM CHLORIDE, POTASSIUM CHLORIDE, SODIUM LACTATE AND CALCIUM CHLORIDE 1000 ML: 600; 310; 30; 20 INJECTION, SOLUTION INTRAVENOUS at 07:06

## 2020-02-10 RX ADMIN — LIDOCAINE HYDROCHLORIDE 50 MG: 20 INJECTION, SOLUTION INFILTRATION; PERINEURAL at 07:35

## 2020-02-10 NOTE — H&P
Cc: Endoscopy Visit    HPI: 64 y.o. male here for screening with prior sigmoid resection for diverticulitis (2009).  He has had a aortic valve replacement.      Past Medical History:   Diagnosis Date   • Aortic stenosis due to bicuspid aortic valve 6/20/2018   • Aortic valve calcification    • Benign essential hypertension    • Broken nose    • CAD (coronary artery disease)     Hx CABG   • Dressler syndrome (CMS/HCC) 9/20/2018   • Heart murmur    • Hemolytic anemia (CMS/HCC)    • Hyperlipidemia     Controlled w/Meds   • Hypertension     Controlled w/Meds   • Migraine    • Mild mitral regurgitation    • Nonrheumatic aortic (valve) stenosis    • Pulmonic regurgitation     Trace   • Reactive depression (situational)    • Severe aortic valve stenosis     S/p AVR on 07/10/18 by Dr. Vidales   • Skin lesion     Suspecious; L Arm x2 Wks   • Tricuspid regurgitation     Trace       Past Surgical History:   Procedure Laterality Date   • AORTIC VALVE REPAIR/REPLACEMENT N/A 7/10/2018    Procedure: INTROP MARIAA; AORTIC VALVE REPLACEMENT; CORONARY ARTERY BYPASS X1 UTILIZING THE ENDOSCOPICALLY HARVESTED LEFT GREATER SAPHENOUS VEIN; PRP;  Surgeon: Hudson Vidales MD;  Location: [facility] OR;  Service: Cardiothoracic   • CARDIAC CATHETERIZATION N/A 7/9/2018    Procedure: Right and Left Heart Cath;  Surgeon: Alexandria Ashraf MD;  Location: [facility] CATH INVASIVE LOCATION;  Service: Cardiovascular   • CARDIAC CATHETERIZATION N/A 7/9/2018    Procedure: Coronary angiography;  Surgeon: Alexandria Ashraf MD;  Location: [facility] CATH INVASIVE LOCATION;  Service: Cardiovascular   • COLECTOMY PARTIAL / TOTAL     • CORONARY ARTERY BYPASS GRAFT     • OTHER SURGICAL HISTORY      Tunica Vaginalis Excision of Hydrocele Left   • TONSILLECTOMY         has No Known Allergies.       Medication List      ASK your doctor about these medications    aspirin 81 MG tablet     atorvastatin 40 MG tablet  Commonly known as:  LIPITOR  Take 1 tablet by mouth every  night.     butalbital-acetaminophen-caffeine -40 MG per tablet  Commonly known as:  FIORICET, ESGIC  Take 1 tablet by mouth every 6 (Six) hours as needed for headache.     escitalopram 10 MG tablet  Commonly known as:  LEXAPRO  Take 1 tablet by mouth Daily.     GLUCOSAMINE CHONDR 1500 COMPLX PO     ibuprofen 600 MG tablet  Commonly known as:  ADVIL,MOTRIN  Take 1 tablet by mouth 3 (Three) Times a Day.     Melatonin 5 MG capsule     metoprolol tartrate 50 MG tablet  Commonly known as:  LOPRESSOR  Take 1 tablet by mouth Every 12 (Twelve) Hours.     MULTIVITAMINS PO     SUPREP BOWEL PREP KIT 17.5-3.13-1.6 GM/177ML solution oral solution  Generic drug:  sodium-potassium-magnesium sulfates  use as directed by provider.          Family History   Problem Relation Age of Onset   • Anemia Father         Hemolytic   • Stroke Paternal Grandfather        Social History     Socioeconomic History   • Marital status:      Spouse name: Yaneth Tolliver   • Number of children: 2   • Years of education: 14   • Highest education level: Not on file   Tobacco Use   • Smoking status: Never Smoker   • Smokeless tobacco: Never Used   Substance and Sexual Activity   • Alcohol use: Yes     Alcohol/week: 4.0 standard drinks     Types: 4 Cans of beer per week     Comment: Caffeine Use   • Drug use: No   • Sexual activity: Defer       Vitals:    02/10/20 0652   BP: 129/81   Pulse: 62   Resp: 16   Temp: 98.1 °F (36.7 °C)   SpO2: 97%       Body mass index is 32.4 kg/m².    Physical Exam    General: No acute distress  Lungs: No labored breathing, Pulse oximetry on room air is 97%.  Heart: RRR  Abdo: Soft  Mental:  Awake, alert, and oriented    Imp:     · Screening, average risk     Plan:  · C scope    Kimberli Joshi MD  7:33 AM

## 2020-02-10 NOTE — OP NOTE
Colonoscopy Procedure Note  Sachin RILEY Unruly  1955  Date of Procedure: 02/10/20    Pre-operative Diagnosis:    · Screening, average risk    Post-operative Diagnosis:  · Diverticulosis in native proximal segment, none in distal segment with anastomosis at about 18 cm    Procedure: Colonoscopy         Recommendations:   · Colonoscopy in 10 years.    · Review diverticulosis info given today  · Keep a copy of the photographs of the procedure given to you today for possible need for reference in the future.      Surgeon: Madelyn    Anesthetic: MAC per Sachin Black MD    Scope Withdrawal Time:  Not applicable    Procedure Details     MAC anesthesia was induced.  The 180 Colonoscopy was inserted blindly into the rectum and advanced to the cecum, with relative ease,  without need for pressure, lift, or turning, until reaching the ascending colon where pressure and lift were needed to get well down into the cecal bowl.    Cecum was identified by the appendiceal orifice and the ileocecal valve and photographed for documentation.      Prep quality was excellent.  A careful inspection was made as the scope was withdrawn, including a retroflexed view of the rectum; there was no suggestion of presence of angiodysplasias, colitis, or polyps but there were some  diverticula, in the proximal segment, which is not unexpected, with no interventions.     Retroflexion in the rectum revealed pronounced rectal veins.    Kimberli Joshi MD  02/10/20

## 2020-02-10 NOTE — ANESTHESIA PREPROCEDURE EVALUATION
Anesthesia Evaluation     Patient summary reviewed   NPO Solid Status: > 8 hours  NPO Liquid Status: > 8 hours           Airway   Mallampati: III  TM distance: <3 FB  Neck ROM: limited  Possible difficult intubation  Dental      Pulmonary     breath sounds clear to auscultation  Cardiovascular   Exercise tolerance: good (4-7 METS)    Rhythm: regular  Rate: normal    (+) CABG,       Neuro/Psych  GI/Hepatic/Renal/Endo      Musculoskeletal     Abdominal    Substance History      OB/GYN          Other                        Anesthesia Plan    ASA 3     MAC     intravenous induction     Anesthetic plan, all risks, benefits, and alternatives have been provided, discussed and informed consent has been obtained with: patient.

## 2020-02-10 NOTE — ANESTHESIA POSTPROCEDURE EVALUATION
Patient: Sachin Tolliver    Procedure Summary     Date:  02/10/20 Room / Location:   [facility]    Anesthesia Start:  0730 Anesthesia Stop:  0749    Procedure:  COLONOSCOPY (N/A ) Diagnosis:       Screen for colon cancer      (Screen for colon cancer [Z12.11])    Surgeon:  Kimberli Joshi MD Provider:  Sachin Black MD    Anesthesia Type:  MAC ASA Status:  3          Anesthesia Type: MAC    Vitals  Vitals Value Taken Time   /88 2/10/2020  8:14 AM   Temp     Pulse 53 2/10/2020  8:14 AM   Resp     SpO2 96 % 2/10/2020  8:14 AM           Post Anesthesia Care and Evaluation    Patient location during evaluation: bedside  Patient participation: complete - patient participated  Level of consciousness: awake and alert  Pain score: 0  Pain management: adequate  Airway patency: patent  Anesthetic complications: No anesthetic complications  PONV Status: none  Cardiovascular status: acceptable  Respiratory status: acceptable  Hydration status: acceptable  Post Neuraxial Block status: Motor and sensory function returned to baseline

## 2020-04-03 ENCOUNTER — TELEMEDICINE (OUTPATIENT)
Dept: FAMILY MEDICINE CLINIC | Facility: CLINIC | Age: 65
End: 2020-04-03

## 2020-04-03 DIAGNOSIS — I10 BENIGN ESSENTIAL HYPERTENSION: Primary | ICD-10-CM

## 2020-04-03 PROCEDURE — 99212 OFFICE O/P EST SF 10 MIN: CPT | Performed by: FAMILY MEDICINE

## 2020-04-03 NOTE — ASSESSMENT & PLAN NOTE
Kelvin 4/3/2020  BP is controlled well. He will recheck in six months. He will continue present meds.

## 2020-04-03 NOTE — PROGRESS NOTES
ASSESSMENT AND PLAN     Problem List Items Addressed This Visit        Cardiovascular and Mediastinum    Benign essential hypertension - Primary    Overview                Current Assessment & Plan     Kelvin 4/3/2020  BP is controlled well. He will recheck in six months. He will continue present meds.                  Return in about 6 months (around 10/3/2020).  Patient was given instructions and counseling regarding his condition or for health maintenance advice. Please see specific information pulled into the AVS if appropriate.          SUBJECTIVE  Sachin Tolliver is a 64 y.o. male being seen via video today for Hypertension               Social History  He  reports that he has never smoked. He has never used smokeless tobacco. He reports that he drinks about 4.0 standard drinks of alcohol per week. He reports that he does not use drugs.    History of the Present Illness   HPI Hasn't checked BP for several weeks but it's been fine. 128/80.  He is taking the coronavirus precautions seriously and doing appropriate measures there.  He is not due for blood work until the fall.  He is taking his medicines as ordered.  Significant Past History  The following portions of the patient's history were reviewed and updated as appropriate:PMHroutine: Social history , Allergies, Current Medications, Active Problem List and Health Maintenance    Review of Systems   Respiratory: Negative for cough and shortness of breath.    Cardiovascular: Negative for chest pain.   Psychiatric/Behavioral: Negative for dysphoric mood.       OBJECTIVE  Vital Signs          BP Readings from Last 1 Encounters:   02/10/20 116/88     Wt Readings from Last 3 Encounters:   02/10/20 114 kg (252 lb 6 oz)   01/09/20 117 kg (259 lb)   12/23/19 117 kg (259 lb)   There is no height or weight on file to calculate BMI.     Physical Exam   Constitutional: He appears well-developed and well-nourished.   Psychiatric: He has a normal mood and affect. His  behavior is normal. Judgment and thought content normal.     Data Reviewed       Patient chose to receive care through a telehealth visit.  He consents to use a video/audio connection for his medical care today.

## 2020-05-21 ENCOUNTER — OFFICE VISIT (OUTPATIENT)
Dept: FAMILY MEDICINE CLINIC | Facility: CLINIC | Age: 65
End: 2020-05-21

## 2020-05-21 VITALS
DIASTOLIC BLOOD PRESSURE: 80 MMHG | HEIGHT: 74 IN | HEART RATE: 58 BPM | OXYGEN SATURATION: 98 % | SYSTOLIC BLOOD PRESSURE: 132 MMHG | TEMPERATURE: 97.8 F | RESPIRATION RATE: 16 BRPM | WEIGHT: 257 LBS | BODY MASS INDEX: 32.98 KG/M2

## 2020-05-21 DIAGNOSIS — R07.89 OTHER CHEST PAIN: ICD-10-CM

## 2020-05-21 DIAGNOSIS — R53.83 MALAISE AND FATIGUE: Primary | ICD-10-CM

## 2020-05-21 DIAGNOSIS — R53.81 MALAISE AND FATIGUE: Primary | ICD-10-CM

## 2020-05-21 DIAGNOSIS — Z95.1 S/P CABG X 1: ICD-10-CM

## 2020-05-21 LAB
D DIMER PPP FEU-MCNC: 0.62 MCGFEU/ML (ref 0–0.49)
ERYTHROCYTE [DISTWIDTH] IN BLOOD BY AUTOMATED COUNT: 13 % (ref 12.3–15.4)
HCT VFR BLD AUTO: 40.4 % (ref 37.5–51)
HGB BLD-MCNC: 14 G/DL (ref 13–17.7)
MCH RBC QN AUTO: 30.8 PG (ref 26.6–33)
MCHC RBC AUTO-ENTMCNC: 34.7 G/DL (ref 31.5–35.7)
MCV RBC AUTO: 88.8 FL (ref 79–97)
PLATELET # BLD AUTO: 209 10*3/MM3 (ref 140–450)
RBC # BLD AUTO: 4.55 10*6/MM3 (ref 4.14–5.8)
TSH SERPL DL<=0.005 MIU/L-ACNC: 2.36 UIU/ML (ref 0.27–4.2)
WBC # BLD AUTO: 7.08 10*3/MM3 (ref 3.4–10.8)

## 2020-05-21 PROCEDURE — 99214 OFFICE O/P EST MOD 30 MIN: CPT | Performed by: FAMILY MEDICINE

## 2020-05-21 NOTE — PROGRESS NOTES
"ASSESSMENT AND PLAN     Problem List Items Addressed This Visit        Other    S/P CABG x 1    Relevant Orders    Stress test with myocardial perfusion      Other Visit Diagnoses     Malaise and fatigue    -  Primary    Relevant Orders    TSH    CBC (No Diff)    Other chest pain        Relevant Orders    D-dimer, Quantitative    Stress test with myocardial perfusion        Return if symptoms worsen or fail to improve.  Patient was given instructions and counseling regarding his condition or for health maintenance advice. Please see specific information pulled into the AVS if appropriate.          SUBJECTIVE  Sachin Tolliver is a 64 y.o. male being seen in our office today for Pain (upper back upper chest bilateral shoulder ); Dizziness (daily for 1 week ); and Fatigue               Social History  He  reports that he has never smoked. He has never used smokeless tobacco. He reports that he drinks about 4.0 standard drinks of alcohol per week. He reports that he does not use drugs.    History of the Present Illness   HPI He had similar symptoms and has CP and a d-dimer and CT chest. Having some pain in his shoulders, back and chest. Gets a \"stuffy\" sensation in his chest at the same time. Worse as the day progresses. Walking doesn't make it worse. Not working out at all. Sometimes feels like he has to take a deep breath. He feels like he is getting more tired than usual and more than he should.   Significant Past History  The following portions of the patient's history were reviewed and updated as appropriate:PMHroutine: Social history , Allergies, Current Medications, Active Problem List and Health Maintenance    Review of Systems   Constitutional: Negative for activity change, appetite change, chills, fatigue, fever and unexpected weight change.   HENT: Negative for congestion, ear pain, hearing loss, mouth sores, nosebleeds, rhinorrhea and sore throat.    Eyes: Negative for pain and visual disturbance. "   Respiratory: Negative for cough, shortness of breath and wheezing.    Cardiovascular: Positive for chest pain. Negative for palpitations and leg swelling.   Gastrointestinal: Negative for abdominal distention, abdominal pain, blood in stool, constipation, diarrhea, nausea and vomiting.   Endocrine: Negative for cold intolerance and heat intolerance.   Genitourinary: Negative for difficulty urinating, discharge, dysuria, frequency, hematuria and urgency.   Musculoskeletal: Negative for back pain and joint swelling.   Skin: Negative for rash and wound.   Neurological: Negative for dizziness, weakness, numbness and headaches.   Hematological: Does not bruise/bleed easily.   Psychiatric/Behavioral: Negative for confusion, dysphoric mood, sleep disturbance and suicidal ideas. The patient is not nervous/anxious.    I have reviewed the ROS as documented by the MA. Milvia Briceno MD      OBJECTIVE  Vital Signs          BP Readings from Last 1 Encounters:   05/21/20 132/80     Wt Readings from Last 3 Encounters:   05/21/20 117 kg (257 lb)   02/10/20 114 kg (252 lb 6 oz)   01/09/20 117 kg (259 lb)   Body mass index is 33 kg/m².     Physical Exam   Constitutional: He is oriented to person, place, and time. Vital signs are normal. He appears well-developed and well-nourished. No distress.   HENT:   Head: Normocephalic.   Cardiovascular: Normal rate and regular rhythm.   Murmur (1-2/6 sys ejection murmur Left upper sternal border) heard.  Pulmonary/Chest: Effort normal and breath sounds normal.   Neurological: He is alert and oriented to person, place, and time. Gait normal.   Psychiatric: He has a normal mood and affect. His behavior is normal. Judgment and thought content normal.   Vitals reviewed.    Data Reviewed

## 2020-05-29 RX ORDER — METOPROLOL TARTRATE 50 MG/1
50 TABLET, FILM COATED ORAL EVERY 12 HOURS SCHEDULED
Qty: 60 TABLET | Refills: 0 | Status: SHIPPED | OUTPATIENT
Start: 2020-05-29 | End: 2020-06-26 | Stop reason: SDUPTHER

## 2020-06-02 ENCOUNTER — HOSPITAL ENCOUNTER (OUTPATIENT)
Dept: CARDIOLOGY | Facility: HOSPITAL | Age: 65
Discharge: HOME | End: 2020-06-02
Admitting: FAMILY MEDICINE

## 2020-06-02 VITALS — BODY MASS INDEX: 33.1 KG/M2 | WEIGHT: 257.94 LBS | HEIGHT: 74 IN

## 2020-06-02 DIAGNOSIS — R07.89 OTHER CHEST PAIN: ICD-10-CM

## 2020-06-02 DIAGNOSIS — Z95.1 S/P CABG X 1: ICD-10-CM

## 2020-06-02 LAB
BH CV NUCLEAR PRIOR STUDY: 3
BH CV STRESS BP STAGE 1: NORMAL
BH CV STRESS DURATION MIN STAGE 1: 3
BH CV STRESS DURATION MIN STAGE 2: 2
BH CV STRESS DURATION SEC STAGE 1: 0
BH CV STRESS DURATION SEC STAGE 2: 45
BH CV STRESS GRADE STAGE 1: 10
BH CV STRESS GRADE STAGE 2: 12
BH CV STRESS HR STAGE 1: 79
BH CV STRESS HR STAGE 2: 90
BH CV STRESS METS STAGE 1: 5
BH CV STRESS METS STAGE 2: 7.5
BH CV STRESS PROTOCOL 1: NORMAL
BH CV STRESS PROTOCOL 2 BP STAGE 1: NORMAL
BH CV STRESS PROTOCOL 2 COMMENTS STAGE 1: NORMAL
BH CV STRESS PROTOCOL 2 DOSE REGADENOSON STAGE 1: 0.4
BH CV STRESS PROTOCOL 2 DURATION MIN STAGE 1: 0
BH CV STRESS PROTOCOL 2 DURATION SEC STAGE 1: 10
BH CV STRESS PROTOCOL 2 HR STAGE 1: 95
BH CV STRESS PROTOCOL 2 STAGE 1: 1
BH CV STRESS PROTOCOL 2: NORMAL
BH CV STRESS RECOVERY BP: NORMAL MMHG
BH CV STRESS RECOVERY HR: 74 BPM
BH CV STRESS SPEED STAGE 1: 1.7
BH CV STRESS SPEED STAGE 2: 2.5
BH CV STRESS STAGE 1: 1
BH CV STRESS STAGE 2: 2
LV EF NUC BP: 41 %
MAXIMAL PREDICTED HEART RATE: 158 BPM
PERCENT MAX PREDICTED HR: 60.13 %
STRESS BASELINE BP: NORMAL MMHG
STRESS BASELINE HR: 58 BPM
STRESS PERCENT HR: 71 %
STRESS POST EXERCISE DUR MIN: 5 MIN
STRESS POST PEAK BP: NORMAL MMHG
STRESS POST PEAK HR: 95 BPM
STRESS TARGET HR: 134 BPM

## 2020-06-02 PROCEDURE — 93017 CV STRESS TEST TRACING ONLY: CPT

## 2020-06-02 PROCEDURE — 78452 HT MUSCLE IMAGE SPECT MULT: CPT

## 2020-06-02 PROCEDURE — 93016 CV STRESS TEST SUPVJ ONLY: CPT | Performed by: INTERNAL MEDICINE

## 2020-06-02 PROCEDURE — A9502 TC99M TETROFOSMIN: HCPCS | Performed by: FAMILY MEDICINE

## 2020-06-02 PROCEDURE — 0 TECHNETIUM TETROFOSMIN KIT: Performed by: FAMILY MEDICINE

## 2020-06-02 PROCEDURE — 93018 CV STRESS TEST I&R ONLY: CPT | Performed by: INTERNAL MEDICINE

## 2020-06-02 PROCEDURE — 78452 HT MUSCLE IMAGE SPECT MULT: CPT | Performed by: INTERNAL MEDICINE

## 2020-06-02 PROCEDURE — 25010000002 REGADENOSON 0.4 MG/5ML SOLUTION: Performed by: FAMILY MEDICINE

## 2020-06-02 RX ADMIN — REGADENOSON 0.4 MG: 0.08 INJECTION, SOLUTION INTRAVENOUS at 08:41

## 2020-06-02 RX ADMIN — TETROFOSMIN 1 DOSE: 1.38 INJECTION, POWDER, LYOPHILIZED, FOR SOLUTION INTRAVENOUS at 08:41

## 2020-06-02 RX ADMIN — TETROFOSMIN 1 DOSE: 1.38 INJECTION, POWDER, LYOPHILIZED, FOR SOLUTION INTRAVENOUS at 07:47

## 2020-06-28 ENCOUNTER — TELEPHONE (OUTPATIENT)
Dept: CARDIOLOGY | Facility: CLINIC | Age: 65
End: 2020-06-28

## 2020-06-28 RX ORDER — METOPROLOL TARTRATE 50 MG/1
TABLET, FILM COATED ORAL
Qty: 60 TABLET | Refills: 0 | Status: SHIPPED | OUTPATIENT
Start: 2020-06-28 | End: 2020-06-29

## 2020-06-28 NOTE — TELEPHONE ENCOUNTER
Please call/schedule pt for f/u-APRN ok.     LOV 09-20-18 with Dr. Irene and was supposed to return 4 wks later.  Pt never made appt.  Request from pharmacy for refill-I will send 30 days only.    Thanks,  Modesta

## 2020-07-01 RX ORDER — ATORVASTATIN CALCIUM 40 MG/1
40 TABLET, FILM COATED ORAL NIGHTLY
Qty: 30 TABLET | Refills: 5 | Status: SHIPPED | OUTPATIENT
Start: 2020-07-01 | End: 2021-02-04 | Stop reason: SDUPTHER

## 2020-08-05 ENCOUNTER — OFFICE VISIT (OUTPATIENT)
Dept: CARDIOLOGY | Facility: CLINIC | Age: 65
End: 2020-08-05

## 2020-08-05 VITALS — HEIGHT: 74 IN | BODY MASS INDEX: 33.01 KG/M2 | WEIGHT: 257.2 LBS

## 2020-08-05 DIAGNOSIS — I10 BENIGN ESSENTIAL HYPERTENSION: Primary | ICD-10-CM

## 2020-08-05 DIAGNOSIS — Z95.1 S/P CABG X 1: ICD-10-CM

## 2020-08-05 DIAGNOSIS — Z95.3 S/P AORTIC VALVE REPLACEMENT WITH TISSUE: ICD-10-CM

## 2020-08-05 DIAGNOSIS — F43.21 SITUATIONAL DEPRESSION: ICD-10-CM

## 2020-08-05 PROCEDURE — 99214 OFFICE O/P EST MOD 30 MIN: CPT | Performed by: INTERNAL MEDICINE

## 2020-08-05 RX ORDER — ESCITALOPRAM OXALATE 10 MG/1
10 TABLET ORAL DAILY
Qty: 90 TABLET | Refills: 1 | Status: SHIPPED | OUTPATIENT
Start: 2020-08-05 | End: 2021-01-29 | Stop reason: SDUPTHER

## 2020-08-05 RX ORDER — METOPROLOL TARTRATE 50 MG/1
50 TABLET, FILM COATED ORAL 2 TIMES DAILY
COMMUNITY
End: 2020-08-10 | Stop reason: SDUPTHER

## 2020-08-05 NOTE — PROGRESS NOTES
Subjective:     Encounter Date:08/05/2020      Patient ID: Sachin Tolliver is a 64 y.o. male.    Chief Complaint:  Coronary Artery Disease   Presents for follow-up visit. The symptoms have been stable. Compliance with diet is good. Compliance with exercise is good. Compliance with medications is good.   Hypertension   This is a chronic problem. The problem is controlled.       64-year-old gentleman presents today for reevaluation.  Clinically he continues to do well.  He was having issues earlier this year but he got back to exercising and with that he is back on track says he feels good.  Blood pressure has been stable in the 120s    Review of Systems   All other systems reviewed and are negative.      Procedures       Objective:     Physical Exam   Constitutional: He is oriented to person, place, and time. He appears well-developed.   HENT:   Head: Normocephalic.   Eyes: Conjunctivae are normal.   Neck: Normal range of motion.   Cardiovascular: Normal rate, regular rhythm and normal heart sounds.   Pulmonary/Chest: Breath sounds normal.   Abdominal: Soft. Bowel sounds are normal.   Musculoskeletal: Normal range of motion. He exhibits no edema.   Neurological: He is alert and oriented to person, place, and time.   Skin: Skin is warm and dry.   Psychiatric: He has a normal mood and affect. His behavior is normal.   Vitals reviewed.    Interpretation Summary 1/9/2020    · Left ventricular systolic function is normal. Calculated EF = 57%. Estimated EF was in agreement with the calculated EF. Estimated EF = 57%. Normal left ventricular cavity size and wall thickness noted. All left ventricular wall segments contract normally. Left ventricular diastolic function is normal.  · There is a 29 mm porcine bioprosthetic valve present. The aortic valve peak and mean gradients are within defined limits. There is no significant prosthetic valve stenosis or regurgitation.  · Trace mitral valve regurgitation is  present.  · Mild tricuspid valve regurgitation is present. Estimated right ventricular systolic pressure from tricuspid regurgitation is normal (<35 mmHg). Calculated right ventricular systolic pressure from tricuspid regurgitation is 24 mmHg.  · Mild dilation of the ascending aorta is present. The ascending aorta measures 3.8cm.       Lab Review:       Assessment:          Diagnosis Plan   1. Benign essential hypertension     2. S/P aortic valve replacement with tissue     3. S/P CABG x 1            Plan:       1.  Hypertension blood pressures great  2.  Coronary artery disease stable back to exercising feeling well.  3.  Bioprosthetic aortic valve.  Echo in January 2020 shows functioning nicely.  4.  Follow-up 1 year or sooner if issues

## 2020-08-10 RX ORDER — METOPROLOL TARTRATE 50 MG/1
50 TABLET, FILM COATED ORAL 2 TIMES DAILY
Qty: 180 TABLET | Refills: 3 | Status: SHIPPED | OUTPATIENT
Start: 2020-08-10 | End: 2021-08-18 | Stop reason: SDUPTHER

## 2021-01-27 ENCOUNTER — IMMUNIZATION (OUTPATIENT)
Dept: VACCINE CLINIC | Facility: HOSPITAL | Age: 66
End: 2021-01-27

## 2021-01-27 PROCEDURE — 0001A: CPT | Performed by: INTERNAL MEDICINE

## 2021-01-27 PROCEDURE — 91300 HC SARSCOV02 VAC 30MCG/0.3ML IM: CPT | Performed by: INTERNAL MEDICINE

## 2021-01-29 DIAGNOSIS — F43.21 SITUATIONAL DEPRESSION: ICD-10-CM

## 2021-01-30 RX ORDER — ESCITALOPRAM OXALATE 10 MG/1
10 TABLET ORAL DAILY
Qty: 90 TABLET | Refills: 1 | Status: SHIPPED | OUTPATIENT
Start: 2021-01-30 | End: 2021-08-26 | Stop reason: ALTCHOICE

## 2021-02-05 RX ORDER — ATORVASTATIN CALCIUM 40 MG/1
40 TABLET, FILM COATED ORAL NIGHTLY
Qty: 30 TABLET | Refills: 5 | Status: SHIPPED | OUTPATIENT
Start: 2021-02-05 | End: 2021-08-18 | Stop reason: SDUPTHER

## 2021-02-17 ENCOUNTER — IMMUNIZATION (OUTPATIENT)
Dept: VACCINE CLINIC | Facility: HOSPITAL | Age: 66
End: 2021-02-17

## 2021-02-17 PROCEDURE — 91300 HC SARSCOV02 VAC 30MCG/0.3ML IM: CPT | Performed by: INTERNAL MEDICINE

## 2021-02-17 PROCEDURE — 0002A: CPT | Performed by: INTERNAL MEDICINE

## 2021-04-23 DIAGNOSIS — G43.109 MIGRAINE WITH AURA AND WITHOUT STATUS MIGRAINOSUS, NOT INTRACTABLE: ICD-10-CM

## 2021-04-26 RX ORDER — BUTALBITAL, ACETAMINOPHEN AND CAFFEINE 50; 325; 40 MG/1; MG/1; MG/1
1 TABLET ORAL EVERY 6 HOURS PRN
Qty: 25 TABLET | Refills: 0 | Status: SHIPPED | OUTPATIENT
Start: 2021-04-26 | End: 2022-08-25 | Stop reason: SDUPTHER

## 2021-06-28 ENCOUNTER — OFFICE VISIT (OUTPATIENT)
Dept: FAMILY MEDICINE CLINIC | Facility: CLINIC | Age: 66
End: 2021-06-28

## 2021-06-28 VITALS
HEIGHT: 74 IN | HEART RATE: 64 BPM | OXYGEN SATURATION: 97 % | BODY MASS INDEX: 33.62 KG/M2 | SYSTOLIC BLOOD PRESSURE: 128 MMHG | RESPIRATION RATE: 18 BRPM | WEIGHT: 262 LBS | DIASTOLIC BLOOD PRESSURE: 88 MMHG

## 2021-06-28 DIAGNOSIS — J02.9 SORE THROAT: Primary | ICD-10-CM

## 2021-06-28 LAB
EXPIRATION DATE: NORMAL
INTERNAL CONTROL: NORMAL
Lab: NORMAL
S PYO AG THROAT QL: NEGATIVE

## 2021-06-28 PROCEDURE — 87880 STREP A ASSAY W/OPTIC: CPT | Performed by: NURSE PRACTITIONER

## 2021-06-28 PROCEDURE — 99213 OFFICE O/P EST LOW 20 MIN: CPT | Performed by: NURSE PRACTITIONER

## 2021-06-28 NOTE — PROGRESS NOTES
Subjective   Sachin Tolliver is a 65 y.o. male.     History of Present Illness   Patient presents with c/o sore throat, sometimes with moving his neck or squeezing his neck. Patient reports that this started about 1 week ago and went away, then returned yesterday. Patient admits to headache and nausea, no vomiting. He denies any fever or shortness of breath. Patient reports that he had the same sore throat after his heart surgery.     The following portions of the patient's history were reviewed and updated as appropriate: allergies, current medications, past family history, past medical history, past social history, past surgical history and problem list.    Review of Systems   Constitutional: Negative for appetite change, chills, fatigue and fever.   HENT: Positive for sore throat. Negative for congestion, ear pain, postnasal drip, rhinorrhea, sinus pressure and sneezing.    Eyes: Negative for redness and itching.   Respiratory: Negative for cough, chest tightness, shortness of breath and wheezing.    Cardiovascular: Negative for chest pain, palpitations and leg swelling.   Musculoskeletal: Negative for myalgias.   Allergic/Immunologic: Negative.    Neurological: Negative for dizziness and headache.       Objective   Physical Exam  Vitals and nursing note reviewed.   Constitutional:       Appearance: Normal appearance. He is well-developed. He is obese.      Comments: Patient diaphoretic   HENT:      Head: Normocephalic and atraumatic.      Right Ear: Ear canal and external ear normal. A middle ear effusion is present.      Left Ear: Ear canal and external ear normal. A middle ear effusion is present.      Nose: Nose normal.      Right Sinus: No maxillary sinus tenderness or frontal sinus tenderness.      Left Sinus: No maxillary sinus tenderness or frontal sinus tenderness.      Mouth/Throat:      Lips: Pink.      Mouth: Mucous membranes are moist.      Tongue: No lesions.      Pharynx: Oropharynx is clear.  Posterior oropharyngeal erythema present. No oropharyngeal exudate.   Eyes:      General:         Right eye: No discharge.         Left eye: No discharge.      Conjunctiva/sclera: Conjunctivae normal.      Pupils: Pupils are equal, round, and reactive to light.   Neck:      Thyroid: No thyromegaly.   Cardiovascular:      Rate and Rhythm: Normal rate and regular rhythm.      Heart sounds: Normal heart sounds. No murmur heard.     Pulmonary:      Effort: Pulmonary effort is normal. No tachypnea or respiratory distress.      Breath sounds: Normal breath sounds. No decreased breath sounds, wheezing or rales.   Musculoskeletal:      Cervical back: Normal range of motion and neck supple.   Lymphadenopathy:      Head:      Right side of head: Tonsillar adenopathy present. No submental, submandibular, preauricular, posterior auricular or occipital adenopathy.      Left side of head: No submental, submandibular, tonsillar, preauricular, posterior auricular or occipital adenopathy.      Cervical: No cervical adenopathy.      Upper Body:      Right upper body: No supraclavicular adenopathy.      Left upper body: No supraclavicular adenopathy.   Skin:     General: Skin is warm and dry.   Neurological:      Mental Status: He is alert and oriented to person, place, and time.   Psychiatric:         Behavior: Behavior normal.         Thought Content: Thought content normal.         Judgment: Judgment normal.         Vitals:    06/28/21 1425   BP: 128/88   Pulse: 64   Resp: 18   SpO2: 97%     Body mass index is 33.62 kg/m².    Procedures    Assessment/Plan   Problems Addressed this Visit     None      Visit Diagnoses     Sore throat    -  Primary    Relevant Orders    POCT rapid strep A      Diagnoses       Codes Comments    Sore throat    -  Primary ICD-10-CM: J02.9  ICD-9-CM: 462         POCT negative  Ibuprofen as needed  Follow-up with cardiology, due to symptoms being similar to post surgical throat issues.          Return if  symptoms worsen or fail to improve.     Patient Instructions   Ibuprofen over the counter as needed.  Follow-up with cardiology   Call with any questions or concerns.  If you develop chest pain, shortness of breath, increased dizziness, palpitations or a headache that will not resolve, go to ER.

## 2021-06-28 NOTE — PATIENT INSTRUCTIONS
Ibuprofen over the counter as needed.  Follow-up with cardiology   Call with any questions or concerns.  If you develop chest pain, shortness of breath, increased dizziness, palpitations or a headache that will not resolve, go to ER.

## 2021-08-18 ENCOUNTER — OFFICE VISIT (OUTPATIENT)
Dept: CARDIOLOGY | Facility: CLINIC | Age: 66
End: 2021-08-18

## 2021-08-18 VITALS
HEIGHT: 74 IN | WEIGHT: 260 LBS | DIASTOLIC BLOOD PRESSURE: 72 MMHG | OXYGEN SATURATION: 98 % | SYSTOLIC BLOOD PRESSURE: 120 MMHG | BODY MASS INDEX: 33.37 KG/M2 | HEART RATE: 57 BPM

## 2021-08-18 DIAGNOSIS — I25.810 CORONARY ARTERY DISEASE INVOLVING CORONARY BYPASS GRAFT OF NATIVE HEART WITHOUT ANGINA PECTORIS: Primary | ICD-10-CM

## 2021-08-18 DIAGNOSIS — I10 BENIGN ESSENTIAL HYPERTENSION: ICD-10-CM

## 2021-08-18 DIAGNOSIS — Z95.3 S/P AORTIC VALVE REPLACEMENT WITH TISSUE: ICD-10-CM

## 2021-08-18 DIAGNOSIS — Z95.1 S/P CABG X 1: ICD-10-CM

## 2021-08-18 PROCEDURE — 93000 ELECTROCARDIOGRAM COMPLETE: CPT | Performed by: NURSE PRACTITIONER

## 2021-08-18 PROCEDURE — 99214 OFFICE O/P EST MOD 30 MIN: CPT | Performed by: NURSE PRACTITIONER

## 2021-08-18 RX ORDER — ATORVASTATIN CALCIUM 40 MG/1
40 TABLET, FILM COATED ORAL NIGHTLY
Qty: 90 TABLET | Refills: 3 | Status: SHIPPED | OUTPATIENT
Start: 2021-08-18 | End: 2022-09-06 | Stop reason: SDUPTHER

## 2021-08-18 RX ORDER — METOPROLOL TARTRATE 50 MG/1
50 TABLET, FILM COATED ORAL 2 TIMES DAILY
Qty: 180 TABLET | Refills: 3 | Status: SHIPPED | OUTPATIENT
Start: 2021-08-18 | End: 2022-09-06 | Stop reason: SDUPTHER

## 2021-08-18 NOTE — PROGRESS NOTES
"    CARDIOLOGY        Patient Name: Sachin Tolliver  :1955  Age: 66 y.o.  Primary Cardiologist: Иван Irene MD  Encounter Provider:  MITCHELL Barrienots    Date of Service: 21          CHIEF COMPLAINT / REASON FOR OFFICE VISIT     Hypertension (1 yr f/ u)      HISTORY OF PRESENT ILLNESS       HPI  Sachin Tolliver is a 66 y.o. male who presents today for annual evaluation.     Pt has a  history significant for valvular regurgitation, hypertension, CAD with prior one-vessel CABG, history of tissue aortic valve replacement    Patient reports that he has done very well over the past year. He states that he is exercising 3 days/week without any exertional symptoms. He is due for repeat lipid panel as this has not been drawn since 2019. Currently denies chest discomfort, dyspnea at rest or with exertion, palpitations, lightheadedness, lower extremity edema, fatigue. Does not routinely monitor blood pressure.      The following portions of the patient's history were reviewed and updated as appropriate: allergies, current medications, past family history, past medical history, past social history, past surgical history and problem list.      VITAL SIGNS     Visit Vitals  /72 (BP Location: Left arm, Patient Position: Sitting, Cuff Size: Large Adult)   Pulse 57   Ht 188 cm (74\")   Wt 118 kg (260 lb)   SpO2 98%   BMI 33.38 kg/m²         Wt Readings from Last 3 Encounters:   21 118 kg (260 lb)   21 119 kg (262 lb)   20 113 kg (250 lb)     Body mass index is 33.38 kg/m².      REVIEW OF SYSTEMS   Review of Systems   Constitutional: Negative for chills, fever, weight gain and weight loss.   Cardiovascular: Negative for leg swelling.   Respiratory: Negative for cough, snoring and wheezing.    Hematologic/Lymphatic: Negative for bleeding problem. Does not bruise/bleed easily.   Skin: Negative for color change.   Musculoskeletal: Negative for falls, joint pain and myalgias. "   Gastrointestinal: Negative for melena.   Genitourinary: Negative for hematuria.   Neurological: Negative for excessive daytime sleepiness.   Psychiatric/Behavioral: Negative for depression. The patient is not nervous/anxious.            PHYSICAL EXAMINATION     Constitutional:       Appearance: Normal appearance. Well-developed.   Eyes:      Conjunctiva/sclera: Conjunctivae normal.   Neck:      Vascular: No carotid bruit.   Pulmonary:      Effort: Pulmonary effort is normal.      Breath sounds: Normal breath sounds.   Cardiovascular:      Normal rate. Regular rhythm. Normal S1. Normal S2.      Murmurs: There is no murmur.      No gallop. No click. No rub.   Musculoskeletal: Normal range of motion. Skin:     General: Skin is warm and dry.   Neurological:      Mental Status: Alert and oriented to person, place, and time.      GCS: GCS eye subscore is 4. GCS verbal subscore is 5. GCS motor subscore is 6.   Psychiatric:         Speech: Speech normal.         Behavior: Behavior normal.         Thought Content: Thought content normal.         Judgment: Judgment normal.           REVIEWED DATA       ECG 12 Lead    Date/Time: 8/18/2021 8:58 AM  Performed by: Nicci Napier APRN  Authorized by: Nicci Napier APRN   Comparison: compared with previous ECG from 12/23/2019  Rhythm: sinus rhythm  Ectopy: bigeminy  Rate: normal  BPM: 64  Conduction: conduction normal  ST Segments: ST segments normal  T Waves: T waves normal  QRS axis: normal  Other: no other findings    Clinical impression: abnormal EKG            Cardiac Procedures:  1. Echocardiogram 5/30/2017. LVEF 61%. Borderline LVH. Grade 1 diastolic dysfunction. Trace aortic valve regurgitation. Moderate to severe aortic valve stenosis. In mobile right coronary cusp. Mean pressure gradient 44.6 mmHg.  2. Echocardiogram 6/13/2018. LVEF 65%. LV cavity is moderate to severely dilated. LV wall thickness is consistent with mild hypertrophy. Moderate to severe aortic  valve stenosis. Maximum pressure gradient 83.2 mmHg, mean pressure gradient 46.1 mmHg. Compared to 5/30/2017 gradients have not changed significantly however the LV is now dilated.  3. Cardiac catheterization 7/10/2018. Aortic valve stenosis, normal pulmonary pressures, small single small vessel CAD. Admit for surgical AVR.  4. Echocardiogram 9/14/2018. LVEF 52%. Normal LV cavity size and wall thickness. All LV wall segments contract normally. Diastolic function is normal. Porcine bioprosthetic valve present, prosthetic valve is normal. Trace to mild tricuspid valve regurgitation. Estimated RVSP less than 35 mmHg.  5. Echocardiogram 1/9/2020. LVEF 57%. Normal LV cavity size and wall thickness. All LV wall segments normally. 29 mm porcine bioprosthetic valve. Peak and mean gradients are within defined limits. No significant prosthetic valve stenosis or regurgitation. Trace mitral valve regurgitation. Mild tricuspid valve regurgitation. Calculated RVSP 24 mmHg. Mild dilation of ascending aorta. Ascending aorta measures 3.8 cm.  6. Myocardial perfusion stress test 6/2/2020. Normal myocardial perfusion study without evidence of ischemia. Impressions consistent with low risk study.          ASSESSMENT & PLAN      Diagnosis Plan   1. Coronary artery disease involving coronary bypass graft of native heart without angina pectoris  Lipid Panel   2. Benign essential hypertension     3. S/P aortic valve replacement with tissue     4. S/P CABG x 1           SUMMARY/DISCUSSION  1. CAD with single-vessel CABG. Patient presents for annual follow-up. He is exercising 3 days weekly without any limiting symptoms. States that he is currently without angina or dyspnea. He had a nuclear stress test performed 1 year ago which was negative for ischemia. Continue GDMT with aspirin, atorvastatin, metoprolol tartrate. In need of repeat lipid panel, this has been ordered.  2. Hypertension. Blood pressure controlled in office today. Continue  metoprolol tartrate 50 mg twice per day.  3. History of aortic valve replacement. Gradient stable on echocardiogram January 2020.  4. History of CABG  5. Ventricular bigeminy. Patient had ventricular bigeminy on ECG today. States that he has had this in the past and is asymptomatic. He had an ischemic evaluation 1 year ago which was negative.  6. Follow-up with Dr. Irene in 1 year. Sooner for problems or complications.        MEDICATIONS         Discharge Medications          Accurate as of August 18, 2021  9:20 AM. If you have any questions, ask your nurse or doctor.            Continue These Medications      Instructions Start Date   aspirin 81 MG tablet   Oral      atorvastatin 40 MG tablet  Commonly known as: LIPITOR   Take 1 tablet by mouth every night.      butalbital-acetaminophen-caffeine -40 MG per tablet  Commonly known as: FIORICET, ESGIC   Take 1 tablet by mouth every 6 (Six) hours as needed for headache.      escitalopram 10 MG tablet  Commonly known as: LEXAPRO   10 mg, Oral, Daily      GLUCOSAMINE CHONDR 1500 COMPLX PO   Oral      Melatonin 5 MG capsule   Oral, Nightly PRN      metoprolol tartrate 50 MG tablet  Commonly known as: LOPRESSOR   50 mg, Oral, 2 Times Daily      MULTIVITAMINS PO   Oral                 **Dragon Disclaimer:   Much of this encounter note is an electronic transcription/translation of spoken language to printed text. The electronic translation of spoken language may permit erroneous, or at times, nonsensical words or phrases to be inadvertently transcribed. Although I have reviewed the note for such errors, some may still exist.

## 2021-08-21 ENCOUNTER — PHARMACOGENOMICS (OUTPATIENT)
Dept: PHARMACY | Facility: HOSPITAL | Age: 66
End: 2021-08-21

## 2021-08-26 DIAGNOSIS — F43.21 SITUATIONAL DEPRESSION: Primary | ICD-10-CM

## 2021-08-26 RX ORDER — FLUOXETINE HYDROCHLORIDE 20 MG/1
20 CAPSULE ORAL DAILY
Qty: 90 CAPSULE | Refills: 1 | Status: SHIPPED | OUTPATIENT
Start: 2021-08-26 | End: 2022-05-09 | Stop reason: SDUPTHER

## 2021-08-31 ENCOUNTER — LAB (OUTPATIENT)
Dept: LAB | Facility: HOSPITAL | Age: 66
End: 2021-08-31

## 2021-08-31 DIAGNOSIS — I25.810 CORONARY ARTERY DISEASE INVOLVING CORONARY BYPASS GRAFT OF NATIVE HEART WITHOUT ANGINA PECTORIS: ICD-10-CM

## 2021-08-31 LAB
CHOLEST SERPL-MCNC: 142 MG/DL (ref 0–200)
HDLC SERPL-MCNC: 48 MG/DL (ref 40–60)
LDLC SERPL CALC-MCNC: 80 MG/DL (ref 0–100)
LDLC/HDLC SERPL: 1.66 {RATIO}
TRIGL SERPL-MCNC: 72 MG/DL (ref 0–150)
VLDLC SERPL-MCNC: 14 MG/DL (ref 5–40)

## 2021-08-31 PROCEDURE — 80061 LIPID PANEL: CPT

## 2021-08-31 PROCEDURE — 36415 COLL VENOUS BLD VENIPUNCTURE: CPT

## 2021-10-02 ENCOUNTER — FLU SHOT (OUTPATIENT)
Dept: FAMILY MEDICINE CLINIC | Facility: CLINIC | Age: 66
End: 2021-10-02

## 2021-10-02 DIAGNOSIS — Z23 NEED FOR INFLUENZA VACCINATION: Primary | ICD-10-CM

## 2021-10-02 PROCEDURE — 90471 IMMUNIZATION ADMIN: CPT | Performed by: NURSE PRACTITIONER

## 2021-10-02 PROCEDURE — 90686 IIV4 VACC NO PRSV 0.5 ML IM: CPT | Performed by: NURSE PRACTITIONER

## 2021-10-13 ENCOUNTER — IMMUNIZATION (OUTPATIENT)
Dept: VACCINE CLINIC | Facility: HOSPITAL | Age: 66
End: 2021-10-13

## 2021-10-13 PROCEDURE — 0004A ADM SARSCOV2 30MCG/0.3ML BOOSTER: CPT | Performed by: INTERNAL MEDICINE

## 2021-10-13 PROCEDURE — 91300 HC SARSCOV02 VAC 30MCG/0.3ML IM: CPT | Performed by: INTERNAL MEDICINE

## 2022-04-22 ENCOUNTER — IMMUNIZATION (OUTPATIENT)
Dept: VACCINE CLINIC | Facility: HOSPITAL | Age: 67
End: 2022-04-22

## 2022-04-22 DIAGNOSIS — Z23 NEED FOR VACCINATION: Primary | ICD-10-CM

## 2022-04-22 PROCEDURE — 0054A HC ADM SARSCV2 30MCG TRS-SUCR BOOSTER: CPT | Performed by: INTERNAL MEDICINE

## 2022-04-22 PROCEDURE — 91305 HC SARSCOV2 VAC 30 MCG TRS-SUCR PFIZER: CPT | Performed by: INTERNAL MEDICINE

## 2022-05-09 DIAGNOSIS — F43.21 SITUATIONAL DEPRESSION: ICD-10-CM

## 2022-05-09 RX ORDER — FLUOXETINE HYDROCHLORIDE 20 MG/1
20 CAPSULE ORAL DAILY
Qty: 30 CAPSULE | Refills: 0 | Status: SHIPPED | OUTPATIENT
Start: 2022-05-09 | End: 2022-07-04 | Stop reason: SDUPTHER

## 2022-07-04 DIAGNOSIS — F43.21 SITUATIONAL DEPRESSION: ICD-10-CM

## 2022-07-05 RX ORDER — FLUOXETINE HYDROCHLORIDE 20 MG/1
20 CAPSULE ORAL DAILY
Qty: 15 CAPSULE | Refills: 0 | Status: SHIPPED | OUTPATIENT
Start: 2022-07-05 | End: 2022-07-17 | Stop reason: SDUPTHER

## 2022-07-17 DIAGNOSIS — F43.21 SITUATIONAL DEPRESSION: ICD-10-CM

## 2022-07-18 RX ORDER — FLUOXETINE HYDROCHLORIDE 20 MG/1
20 CAPSULE ORAL DAILY
Qty: 30 CAPSULE | Refills: 0 | Status: SHIPPED | OUTPATIENT
Start: 2022-07-18 | End: 2022-08-12 | Stop reason: SINTOL

## 2022-08-10 NOTE — PROGRESS NOTES
Assessment and Plan     Patient Instructions    Problem List Items Addressed This Visit        Cardiac and Vasculature    Benign essential hypertension    Overview     Kelvin 8/12/2022  BP is controlled well. He will recheck in six months. He will continue present meds. Prescription will be sent when notified by pharmacy..             Relevant Orders    Comprehensive Metabolic Panel    Hyperlipidemia    Overview     Kelvin 8/12/2022 Labs are drawn today          Relevant Orders    Lipid Panel With LDL / HDL Ratio       Mental Health    Situational depression    Overview     Does not like the side effects he is getting with Prozac, including sweating.  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         Relevant Medications    citalopram (CeleXA) 20 MG tablet      Other Visit Diagnoses     Welcome to Medicare preventive visit    -  Primary    Healthcare maintenance        High risk medication use        Relevant Orders    CBC (No Diff)    Screening for prostate cancer        Relevant Orders    PSA SCREENING             Return in about 6 months (around 2/12/2023).          Sachin is a 66 y.o. being seen today for  Annual Exam   Subjective   History of the Present Illness   Sachin Tolliver 66 y.o. male who presents for yearly preventive exam.  He exercises three days a week.  Last colonoscopy: colonoscopy 2 years ago without abnormalities.  Immunizations: not up to date - needs zostavax  PSA was discussed and ordered He has no increased risk of prostate cancer  He does see his dentist regularly  His diet is healthy but some overeating  He describes his alcohol intake as social drinker  Practicing social distancing, handwashing and keeping hands from face? yes  COVID vaccine UTD? yes  His cardiovascular risk is: LDL goal is under 100  This patient has ever been tested for HepC: yes     Social History  He  reports that he has never smoked. He has  never used smokeless tobacco. He reports current alcohol use of about 4.0 standard drinks of alcohol per week. He reports that he does not use drugs.  Objective   Vital Signs        BP Readings from Last 1 Encounters:   08/12/22 132/80     Wt Readings from Last 3 Encounters:   08/12/22 117 kg (259 lb)   08/18/21 118 kg (260 lb)   06/28/21 119 kg (262 lb)   Body mass index is 33.25 kg/m².     Physical Exam  Vitals reviewed.   Constitutional:       General: He is not in acute distress.     Appearance: He is well-developed.   HENT:      Head: Normocephalic and atraumatic.      Right Ear: Tympanic membrane, ear canal and external ear normal.      Left Ear: Tympanic membrane, ear canal and external ear normal.      Mouth/Throat:      Comments: Mask in place  Eyes:      Conjunctiva/sclera: Conjunctivae normal.   Neck:      Thyroid: No thyromegaly.   Cardiovascular:      Rate and Rhythm: Normal rate and regular rhythm.      Pulses:           Dorsalis pedis pulses are 1+ on the right side and 1+ on the left side.        Posterior tibial pulses are 1+ on the right side and 1+ on the left side.      Heart sounds: Normal heart sounds. No murmur heard.  Pulmonary:      Effort: Pulmonary effort is normal. No respiratory distress.      Breath sounds: Normal breath sounds. No wheezing or rales.   Abdominal:      General: There is no distension.      Palpations: Abdomen is soft. There is no mass.      Tenderness: There is no abdominal tenderness.   Musculoskeletal:         General: No deformity. Normal range of motion.      Cervical back: Neck supple.   Lymphadenopathy:      Cervical: No cervical adenopathy.   Skin:     General: Skin is warm and dry.   Neurological:      Mental Status: He is alert and oriented to person, place, and time.   Psychiatric:         Behavior: Behavior normal.         Thought Content: Thought content normal.         Judgment: Judgment normal.     No EKG done as he gets that at cardiology. No eye exam  done, he sees ophthalmology/optometry.            Patient was given instructions and counseling regarding his condition or for health maintenance advice. Please see specific information pulled into the AVS if appropriate.

## 2022-08-12 ENCOUNTER — OFFICE VISIT (OUTPATIENT)
Dept: FAMILY MEDICINE CLINIC | Facility: CLINIC | Age: 67
End: 2022-08-12

## 2022-08-12 VITALS
SYSTOLIC BLOOD PRESSURE: 132 MMHG | BODY MASS INDEX: 33.25 KG/M2 | HEART RATE: 55 BPM | WEIGHT: 259 LBS | RESPIRATION RATE: 19 BRPM | OXYGEN SATURATION: 98 % | DIASTOLIC BLOOD PRESSURE: 80 MMHG

## 2022-08-12 DIAGNOSIS — Z12.5 SCREENING FOR PROSTATE CANCER: ICD-10-CM

## 2022-08-12 DIAGNOSIS — Z00.00 WELCOME TO MEDICARE PREVENTIVE VISIT: Primary | ICD-10-CM

## 2022-08-12 DIAGNOSIS — Z79.899 HIGH RISK MEDICATION USE: ICD-10-CM

## 2022-08-12 DIAGNOSIS — E78.2 MIXED HYPERLIPIDEMIA: ICD-10-CM

## 2022-08-12 DIAGNOSIS — I10 BENIGN ESSENTIAL HYPERTENSION: ICD-10-CM

## 2022-08-12 DIAGNOSIS — F43.21 SITUATIONAL DEPRESSION: ICD-10-CM

## 2022-08-12 DIAGNOSIS — Z00.00 HEALTHCARE MAINTENANCE: ICD-10-CM

## 2022-08-12 PROCEDURE — 1170F FXNL STATUS ASSESSED: CPT | Performed by: FAMILY MEDICINE

## 2022-08-12 PROCEDURE — 1160F RVW MEDS BY RX/DR IN RCRD: CPT | Performed by: FAMILY MEDICINE

## 2022-08-12 PROCEDURE — G0402 INITIAL PREVENTIVE EXAM: HCPCS | Performed by: FAMILY MEDICINE

## 2022-08-12 RX ORDER — CITALOPRAM 20 MG/1
20 TABLET ORAL DAILY
Qty: 30 TABLET | Refills: 5 | Status: SHIPPED | OUTPATIENT
Start: 2022-08-12 | End: 2023-02-20 | Stop reason: SDUPTHER

## 2022-08-13 LAB
ALBUMIN SERPL-MCNC: 4.5 G/DL (ref 3.8–4.8)
ALBUMIN/GLOB SERPL: 1.9 {RATIO} (ref 1.2–2.2)
ALP SERPL-CCNC: 82 IU/L (ref 44–121)
ALT SERPL-CCNC: 28 IU/L (ref 0–44)
AST SERPL-CCNC: 33 IU/L (ref 0–40)
BILIRUB SERPL-MCNC: 1 MG/DL (ref 0–1.2)
BUN SERPL-MCNC: 13 MG/DL (ref 8–27)
BUN/CREAT SERPL: 11 (ref 10–24)
CALCIUM SERPL-MCNC: 9.5 MG/DL (ref 8.6–10.2)
CHLORIDE SERPL-SCNC: 102 MMOL/L (ref 96–106)
CHOLEST SERPL-MCNC: 166 MG/DL (ref 100–199)
CO2 SERPL-SCNC: 25 MMOL/L (ref 20–29)
CREAT SERPL-MCNC: 1.14 MG/DL (ref 0.76–1.27)
EGFRCR SERPLBLD CKD-EPI 2021: 72 ML/MIN/1.73
ERYTHROCYTE [DISTWIDTH] IN BLOOD BY AUTOMATED COUNT: 12.8 % (ref 11.6–15.4)
GLOBULIN SER CALC-MCNC: 2.4 G/DL (ref 1.5–4.5)
GLUCOSE SERPL-MCNC: 96 MG/DL (ref 65–99)
HCT VFR BLD AUTO: 43.4 % (ref 37.5–51)
HDLC SERPL-MCNC: 46 MG/DL
HGB BLD-MCNC: 14.9 G/DL (ref 13–17.7)
LDLC SERPL CALC-MCNC: 103 MG/DL (ref 0–99)
LDLC/HDLC SERPL: 2.2 RATIO (ref 0–3.6)
MCH RBC QN AUTO: 30.9 PG (ref 26.6–33)
MCHC RBC AUTO-ENTMCNC: 34.3 G/DL (ref 31.5–35.7)
MCV RBC AUTO: 90 FL (ref 79–97)
PLATELET # BLD AUTO: 236 X10E3/UL (ref 150–450)
POTASSIUM SERPL-SCNC: 4.9 MMOL/L (ref 3.5–5.2)
PROT SERPL-MCNC: 6.9 G/DL (ref 6–8.5)
PSA SERPL-MCNC: 2.1 NG/ML (ref 0–4)
RBC # BLD AUTO: 4.82 X10E6/UL (ref 4.14–5.8)
SODIUM SERPL-SCNC: 139 MMOL/L (ref 134–144)
TRIGL SERPL-MCNC: 90 MG/DL (ref 0–149)
VLDLC SERPL CALC-MCNC: 17 MG/DL (ref 5–40)
WBC # BLD AUTO: 5.8 X10E3/UL (ref 3.4–10.8)

## 2022-08-24 ENCOUNTER — OFFICE VISIT (OUTPATIENT)
Dept: CARDIOLOGY | Facility: CLINIC | Age: 67
End: 2022-08-24

## 2022-08-24 VITALS
HEIGHT: 74 IN | OXYGEN SATURATION: 97 % | BODY MASS INDEX: 33.01 KG/M2 | DIASTOLIC BLOOD PRESSURE: 80 MMHG | SYSTOLIC BLOOD PRESSURE: 118 MMHG | HEART RATE: 52 BPM | WEIGHT: 257.2 LBS

## 2022-08-24 DIAGNOSIS — Z95.3 S/P AORTIC VALVE REPLACEMENT WITH TISSUE: ICD-10-CM

## 2022-08-24 DIAGNOSIS — I25.810 CORONARY ARTERY DISEASE INVOLVING CORONARY BYPASS GRAFT OF NATIVE HEART WITHOUT ANGINA PECTORIS: Primary | ICD-10-CM

## 2022-08-24 DIAGNOSIS — Z95.1 S/P CABG X 1: ICD-10-CM

## 2022-08-24 DIAGNOSIS — I10 BENIGN ESSENTIAL HYPERTENSION: ICD-10-CM

## 2022-08-24 PROCEDURE — 93000 ELECTROCARDIOGRAM COMPLETE: CPT | Performed by: NURSE PRACTITIONER

## 2022-08-24 PROCEDURE — 99214 OFFICE O/P EST MOD 30 MIN: CPT | Performed by: NURSE PRACTITIONER

## 2022-08-25 DIAGNOSIS — G43.109 MIGRAINE WITH AURA AND WITHOUT STATUS MIGRAINOSUS, NOT INTRACTABLE: ICD-10-CM

## 2022-08-25 RX ORDER — BUTALBITAL, ACETAMINOPHEN AND CAFFEINE 50; 325; 40 MG/1; MG/1; MG/1
1 TABLET ORAL EVERY 6 HOURS PRN
Qty: 25 TABLET | Refills: 0 | Status: CANCELLED | OUTPATIENT
Start: 2022-08-25

## 2022-08-26 RX ORDER — BUTALBITAL, ACETAMINOPHEN AND CAFFEINE 50; 325; 40 MG/1; MG/1; MG/1
1 TABLET ORAL EVERY 6 HOURS PRN
Qty: 25 TABLET | Refills: 0 | Status: SHIPPED | OUTPATIENT
Start: 2022-08-26

## 2022-09-06 RX ORDER — METOPROLOL TARTRATE 50 MG/1
50 TABLET, FILM COATED ORAL 2 TIMES DAILY
Qty: 180 TABLET | Refills: 3 | Status: SHIPPED | OUTPATIENT
Start: 2022-09-06

## 2022-09-06 RX ORDER — ATORVASTATIN CALCIUM 40 MG/1
40 TABLET, FILM COATED ORAL NIGHTLY
Qty: 90 TABLET | Refills: 3 | Status: SHIPPED | OUTPATIENT
Start: 2022-09-06

## 2022-10-07 ENCOUNTER — IMMUNIZATION (OUTPATIENT)
Dept: VACCINE CLINIC | Facility: HOSPITAL | Age: 67
End: 2022-10-07

## 2022-10-07 DIAGNOSIS — Z23 NEED FOR VACCINATION: Primary | ICD-10-CM

## 2022-10-07 PROCEDURE — 91312 HC SARSCOV2 VAC 30MCG/0.3ML IM BIVALENT BOOSTER 12 YRS AND OLDER: CPT | Performed by: INTERNAL MEDICINE

## 2022-10-07 PROCEDURE — 0124A: CPT | Performed by: INTERNAL MEDICINE

## 2022-10-20 ENCOUNTER — PATIENT ROUNDING (BHMG ONLY) (OUTPATIENT)
Dept: ORTHOPEDIC SURGERY | Facility: CLINIC | Age: 67
End: 2022-10-20

## 2022-10-20 ENCOUNTER — OFFICE VISIT (OUTPATIENT)
Dept: ORTHOPEDIC SURGERY | Facility: CLINIC | Age: 67
End: 2022-10-20

## 2022-10-20 VITALS — TEMPERATURE: 97.9 F | WEIGHT: 257 LBS | HEIGHT: 74 IN | BODY MASS INDEX: 32.98 KG/M2

## 2022-10-20 DIAGNOSIS — M17.12 PRIMARY OSTEOARTHRITIS OF LEFT KNEE: Primary | ICD-10-CM

## 2022-10-20 PROCEDURE — 99214 OFFICE O/P EST MOD 30 MIN: CPT | Performed by: NURSE PRACTITIONER

## 2022-10-20 PROCEDURE — 73562 X-RAY EXAM OF KNEE 3: CPT | Performed by: NURSE PRACTITIONER

## 2022-10-20 NOTE — PROGRESS NOTES
Patient: Sachin Tolliver  YOB: 1955 67 y.o. male  Medical Record Number: [id number]    Chief Complaints:   Chief Complaint   Patient presents with   • Left Knee - Initial Evaluation       History of Present Illness:Sachin Tolliver is a 67 y.o. male who presents as a new patient both myself as well as to the practice with complaints of left knee pain.  Patient reports he has had some mild intermittent left knee pain through the years but several weeks ago the pain worsened and 1 week ago today he got out of bed and the pain was so severe he could not walk.  Patient denies any injury.  Denies any other joint pain.  He tried ibuprofen with minimal improvement as well as heat.    Allergies: No Known Allergies    Medications:   Current Outpatient Medications   Medication Sig Dispense Refill   • aspirin 81 MG tablet Take by mouth.     • atorvastatin (LIPITOR) 40 MG tablet Take 1 tablet by mouth every night. 90 tablet 3   • butalbital-acetaminophen-caffeine (FIORICET, ESGIC) -40 MG per tablet Take 1 tablet by mouth every 6 (Six) hours as needed for headache. 25 tablet 0   • citalopram (CeleXA) 20 MG tablet Take 1 tablet by mouth Daily. 30 tablet 5   • Glucosamine-Chondroit-Vit C-Mn (GLUCOSAMINE CHONDR 1500 COMPLX PO) Take by mouth.     • Melatonin 5 MG capsule Take  by mouth At Night As Needed.     • metoprolol tartrate (LOPRESSOR) 50 MG tablet Take 1 tablet by mouth 2 (Two) Times a Day. 180 tablet 3   • Multiple Vitamin (MULTIVITAMINS PO) Take by mouth.       No current facility-administered medications for this visit.         The following portions of the patient's history were reviewed and updated as appropriate: allergies, current medications, past family history, past medical history, past social history, past surgical history and problem list.    Review of Systems:   A 14 point review of systems was performed. All systems negative except pertinent positives/negative listed in HPI  "above    Physical Exam:   Vitals:    10/20/22 0852   Temp: 97.9 °F (36.6 °C)   TempSrc: Temporal   Weight: 117 kg (257 lb)   Height: 188 cm (74.02\")       General: A and O x 3, ASA, NAD    SCLERA:    Normal    Skin clear no unusual lesions noted  Left knee patient does have trace amount of effusion noted as well as a small palpable Baker's cyst, 116 degrees flexion neutral in extension with a positive medial Chasity negative Lockman calf soft and nontender       Radiology:  Xrays 3views (ap,lateral, sunrise) left knee were ordered and reviewed today secondary to pain show bone-on-bone end-stage osteoarthritis of the medial compartment.  No compared to views available    Assessment/Plan: End-stage osteoarthritis left knee with increasing pain    The patient and I discussed options, at this point he would like to hold off on injection, instead we will try meloxicam as needed, I did review blood work that was done recently he has normal kidney function test.  Prescription sent to pharmacy.  He will be referred to outpatient physical therapy and I will see him back as needed.  He understands at some point he may want to proceed with surgical intervention but we will start with conservative measures first      Luz Jean, APRN  10/20/2022  "

## 2022-10-20 NOTE — PROGRESS NOTES
A [employer] Message has been sent to the patient for PATIENT ROUNDING with [facility] [place]

## 2022-10-21 ENCOUNTER — TELEPHONE (OUTPATIENT)
Dept: ORTHOPEDIC SURGERY | Facility: CLINIC | Age: 67
End: 2022-10-21

## 2022-10-21 NOTE — TELEPHONE ENCOUNTER
Provider: AMISH STEPHEN  Caller: VANI MORLEY  Relationship to Patient: SELF    Phone Number: [phone]  Reason for Call: PHYSICAL THERAPY ORDER / LT KNEE  When was the patient last seen: 10-20-22    PT WOULD LIKE A CALL BACK TO DISCUSS OTHER PHYSICAL THERAPY OPTIONS.  PT STATED HE CAN'T GET INTO [facility] FOR 3 WEEKS.

## 2022-10-21 NOTE — TELEPHONE ENCOUNTER
Spoke with patient and let him know that he can go to any other facility for pt and if he found a place that can get him in sooner than [facility] to call and let us know so that we can fax over his referral.

## 2022-10-25 ENCOUNTER — TELEPHONE (OUTPATIENT)
Dept: ORTHOPEDIC SURGERY | Facility: CLINIC | Age: 67
End: 2022-10-25

## 2022-10-25 DIAGNOSIS — M17.12 PRIMARY OSTEOARTHRITIS OF LEFT KNEE: Primary | ICD-10-CM

## 2022-10-25 RX ORDER — MELOXICAM 15 MG/1
15 TABLET ORAL DAILY
Qty: 30 TABLET | Refills: 0 | Status: SHIPPED | OUTPATIENT
Start: 2022-10-25 | End: 2023-02-20

## 2022-10-25 NOTE — TELEPHONE ENCOUNTER
Caller: VANI MORLEY    Relationship: PATIENT    Best call back number: [phone]    What orders are you requesting (i.e. lab or imaging):  PT ORDERS FOR LEFT KNEE (IN Epic 10/20/22)    In what timeframe would the patient need to come in:  PATIENT HAS APPT WITH [facility] 10/26/22  PLEASE FAX ORDERS

## 2022-11-10 ENCOUNTER — APPOINTMENT (OUTPATIENT)
Dept: PHYSICAL THERAPY | Facility: HOSPITAL | Age: 67
End: 2022-11-10

## 2023-02-20 ENCOUNTER — OFFICE VISIT (OUTPATIENT)
Dept: FAMILY MEDICINE CLINIC | Facility: CLINIC | Age: 68
End: 2023-02-20
Payer: MEDICARE

## 2023-02-20 VITALS
RESPIRATION RATE: 20 BRPM | BODY MASS INDEX: 33.37 KG/M2 | WEIGHT: 260 LBS | DIASTOLIC BLOOD PRESSURE: 88 MMHG | HEIGHT: 74 IN | HEART RATE: 66 BPM | OXYGEN SATURATION: 96 % | SYSTOLIC BLOOD PRESSURE: 128 MMHG

## 2023-02-20 DIAGNOSIS — D58.9 HEREDITARY HEMOLYTIC ANEMIA: ICD-10-CM

## 2023-02-20 DIAGNOSIS — F43.21 SITUATIONAL DEPRESSION: ICD-10-CM

## 2023-02-20 DIAGNOSIS — I10 BENIGN ESSENTIAL HYPERTENSION: Primary | ICD-10-CM

## 2023-02-20 PROCEDURE — 90677 PCV20 VACCINE IM: CPT | Performed by: FAMILY MEDICINE

## 2023-02-20 PROCEDURE — G0009 ADMIN PNEUMOCOCCAL VACCINE: HCPCS | Performed by: FAMILY MEDICINE

## 2023-02-20 PROCEDURE — 99214 OFFICE O/P EST MOD 30 MIN: CPT | Performed by: FAMILY MEDICINE

## 2023-02-20 RX ORDER — CITALOPRAM 20 MG/1
20 TABLET ORAL DAILY
Qty: 90 TABLET | Refills: 3 | Status: SHIPPED | OUTPATIENT
Start: 2023-02-20

## 2023-02-20 NOTE — PROGRESS NOTES
Assessment and Plan     Patient Instructions    Problem List Items Addressed This Visit        Cardiac and Vasculature    Benign essential hypertension - Primary    Overview     Kelvin 2/20/2023  BP is controlled well. He will recheck in six months. He will continue present meds. Prescription will be sent when notified by pharmacy..                Hematology and Neoplasia    Hereditary hemolytic anemia (HCC)    Overview     Description: He's had only one episode of hemolytic anemia. His father had a similar problem. His dad had a lot of issues with that. He used to be jaundiced.            Mental Health    Situational depression    Overview     Does not like the side effects he got with Prozac, including sweating.  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         Relevant Medications    citalopram (CeleXA) 20 MG tablet          Return in about 6 months (around 8/20/2023) for yearly Medicare Exam, lab with next visit.          Sachin is a 67 y.o. being seen today for  Hypertension and Hyperlipidemia   Subjective   History of the Present Illness   No complaints.  Taking meds as ordered.  No headache, chest pain or shortness of breath. Goes to the gym three days a week, walks two days more a week.   Social History  He  reports that he has never smoked. He has never used smokeless tobacco. He reports current alcohol use of about 4.0 standard drinks per week. He reports that he does not use drugs.  Objective   Vital Signs        BP Readings from Last 1 Encounters:   02/20/23 128/88     Wt Readings from Last 3 Encounters:   02/20/23 118 kg (260 lb)   10/20/22 117 kg (257 lb)   08/24/22 117 kg (257 lb 3.2 oz)   Body mass index is 33.38 kg/m².     Physical Exam  Vitals reviewed.   Constitutional:       Appearance: Normal appearance.   HENT:      Head:      Comments: Mask in place.  Cardiovascular:      Rate and Rhythm: Normal rate and regular rhythm.       Heart sounds: No murmur heard.  Pulmonary:      Effort: Pulmonary effort is normal.      Breath sounds: Normal breath sounds. No wheezing.   Neurological:      Mental Status: He is alert and oriented to person, place, and time.   Psychiatric:         Mood and Affect: Mood normal.         Behavior: Behavior normal.         Thought Content: Thought content normal.         Judgment: Judgment normal.               Patient was given instructions and counseling regarding his condition or for health maintenance advice. Please see specific information pulled into the AVS if appropriate.

## 2023-04-06 ENCOUNTER — PATIENT MESSAGE (OUTPATIENT)
Dept: FAMILY MEDICINE CLINIC | Facility: CLINIC | Age: 68
End: 2023-04-06
Payer: MEDICARE

## 2023-04-06 DIAGNOSIS — U07.1 COVID-19 VIRUS INFECTION: Primary | ICD-10-CM

## 2023-04-07 ENCOUNTER — TELEPHONE (OUTPATIENT)
Dept: FAMILY MEDICINE CLINIC | Facility: CLINIC | Age: 68
End: 2023-04-07

## 2023-04-07 NOTE — TELEPHONE ENCOUNTER
From: Sachin Tollievr  To: Milvia Briceno  Sent: 4/6/2023 7:16 PM EDT  Subject: COVID    Hi Milvia - hope you're doing well    I started feeling lousy this past Monday - thought it was a sinus-sorta virus deal    I did a home COVID test this afternoon - positive - repeated the test - positive     So it seems I have COVID    My symptoms are like a not-too-bad cold - not serious at all - maybe a degree or 2 of fever, no shortness of breath, headache, tired    What are my next steps?

## 2023-04-07 NOTE — TELEPHONE ENCOUNTER
Caller: Sachin Tolliver    Relationship to patient: Self    Best call back number: [phone] (Mobile)    Date of exposure: symptoms started Tuesday     Date of positive COVID19 test: 04/06/23    Date if possible COVID19 exposure:      COVID19 symptoms: FEVER, HEADACHE, MUCUS, &COUGH    Date of initial quarantine:      Additional information or concerns: PATIENT CALLED TO ADVISE THAT HE HAS JUST TESTED POSITIVE FOR COVID VIA A HOME COVID TEST. PATIENT IS WANTING TO KNOW WHAT TYPES OF TREATMENT OR MEDICATION CAN BE PRESCRIBED TO HELP WITH HIS SYMPTOMS.      What is the patients preferred pharmacy: [facility]            THANKS

## 2023-04-25 ENCOUNTER — OFFICE VISIT (OUTPATIENT)
Dept: ORTHOPEDIC SURGERY | Facility: CLINIC | Age: 68
End: 2023-04-25
Payer: MEDICARE

## 2023-04-25 VITALS — WEIGHT: 254.4 LBS | TEMPERATURE: 97.5 F | BODY MASS INDEX: 32.65 KG/M2 | HEIGHT: 74 IN

## 2023-04-25 DIAGNOSIS — M17.12 PRIMARY OSTEOARTHRITIS OF LEFT KNEE: Primary | ICD-10-CM

## 2023-04-25 RX ORDER — METHYLPREDNISOLONE ACETATE 80 MG/ML
80 INJECTION, SUSPENSION INTRA-ARTICULAR; INTRALESIONAL; INTRAMUSCULAR; SOFT TISSUE
Status: COMPLETED | OUTPATIENT
Start: 2023-04-25 | End: 2023-04-25

## 2023-04-25 RX ADMIN — METHYLPREDNISOLONE ACETATE 80 MG: 80 INJECTION, SUSPENSION INTRA-ARTICULAR; INTRALESIONAL; INTRAMUSCULAR; SOFT TISSUE at 14:58

## 2023-04-25 NOTE — PROGRESS NOTES
Patient: Sachin Tolliver  YOB: 1955 67 y.o. male  Medical Record Number: [id number]    Chief Complaints:   Chief Complaint   Patient presents with   • Left Knee - Follow-up       History of Present Illness:Sachin Tolliver is a 67 y.o. male who presents with complaints of increased left knee pain.  The patient was seen previously, has significant osteoarthritis, we tried anti-inflammatory as well as physical therapy but the medial knee pain has worsened.  Patient reports its worse with standing walking increased activity.  Denies any recent injury    Allergies: No Known Allergies    Medications:   Current Outpatient Medications   Medication Sig Dispense Refill   • aspirin 81 MG tablet Take by mouth.     • atorvastatin (LIPITOR) 40 MG tablet Take 1 tablet by mouth every night. 90 tablet 3   • butalbital-acetaminophen-caffeine (FIORICET, ESGIC) -40 MG per tablet Take 1 tablet by mouth every 6 (Six) hours as needed for headache. 25 tablet 0   • citalopram (CeleXA) 20 MG tablet Take 1 tablet by mouth Daily. 90 tablet 3   • Glucosamine-Chondroit-Vit C-Mn (GLUCOSAMINE CHONDR 1500 COMPLX PO) Take by mouth.     • Melatonin 5 MG capsule Take  by mouth At Night As Needed.     • metoprolol tartrate (LOPRESSOR) 50 MG tablet Take 1 tablet by mouth 2 (Two) Times a Day. 180 tablet 3   • Multiple Vitamin (MULTIVITAMINS PO) Take by mouth.     • Zoster Vac Recomb Adjuvanted (Shingrix) 50 MCG/0.5ML reconstituted suspension Inject 0.5 mL into the appropriate muscle as directed by prescriber. (Patient not taking: Reported on 4/25/2023) 1 each 0     No current facility-administered medications for this visit.         The following portions of the patient's history were reviewed and updated as appropriate: allergies, current medications, past family history, past medical history, past social history, past surgical history and problem list.    Review of Systems:   14 point review of systems was performed. All systems  "negative except pertinent positives/negatives listed in HPI above    Physical Exam:   Vitals:    04/25/23 1419   Temp: 97.5 °F (36.4 °C)   Weight: 115 kg (254 lb 6.4 oz)   Height: 188 cm (74\")   PainSc:   3   PainLoc: Knee       General: A and O x 3, ASA, NAD    Skin clear no unusual lesions noted  Left knee patient has trace amount of effusion noted with 116 degrees flexion neutral in extension with a positive medial Chasity negative Lockman calf soft and nontender      Radiology:  Xrays 3views (ap,lateral, sunrise) previous x-rays of the left knee were reviewed show significant near bone-on-bone end-stage osteoarthritis in the medial compartment.    Assessment/Plan: Osteoarthritis left knee with increasing pain    Patient and I discussed options, we will proceed with left knee cortisone injection, continue physical therapy, meloxicam as needed and I will see him back for follow-up in 4 to 6 weeks and potentially discuss total knee replacement at that time we will get new x-rays at that time    Large Joint Arthrocentesis: L knee  Date/Time: 4/25/2023 2:58 PM  Consent given by: patient  Site marked: site marked  Timeout: Immediately prior to procedure a time out was called to verify the correct patient, procedure, equipment, support staff and site/side marked as required   Supporting Documentation  Indications: pain and joint swelling   Procedure Details  Location: knee - L knee  Preparation: Patient was prepped and draped in the usual sterile fashion  Needle size: 22 G  Approach: anterolateral  Medications administered: 80 mg methylPREDNISolone acetate 80 MG/ML; 2 mL lidocaine (cardiac)  Patient tolerance: patient tolerated the procedure well with no immediate complications            Luz Jean, MITCHELL  4/25/2023  "

## 2023-04-26 DIAGNOSIS — M17.12 PRIMARY OSTEOARTHRITIS OF LEFT KNEE: Primary | ICD-10-CM

## 2023-04-26 RX ORDER — MELOXICAM 15 MG/1
15 TABLET ORAL DAILY
Qty: 30 TABLET | Refills: 3 | Status: SHIPPED | OUTPATIENT
Start: 2023-04-26

## 2023-06-13 ENCOUNTER — PREP FOR SURGERY (OUTPATIENT)
Dept: OTHER | Facility: HOSPITAL | Age: 68
End: 2023-06-13
Payer: MEDICARE

## 2023-06-13 ENCOUNTER — OFFICE VISIT (OUTPATIENT)
Dept: ORTHOPEDIC SURGERY | Facility: CLINIC | Age: 68
End: 2023-06-13
Payer: MEDICARE

## 2023-06-13 VITALS — HEIGHT: 74 IN | BODY MASS INDEX: 32.47 KG/M2 | TEMPERATURE: 96.6 F | WEIGHT: 253 LBS

## 2023-06-13 DIAGNOSIS — M17.12 PRIMARY OSTEOARTHRITIS OF LEFT KNEE: Primary | ICD-10-CM

## 2023-06-13 PROCEDURE — 99214 OFFICE O/P EST MOD 30 MIN: CPT | Performed by: NURSE PRACTITIONER

## 2023-06-13 RX ORDER — CEFAZOLIN SODIUM 2 G/100ML
2 INJECTION, SOLUTION INTRAVENOUS ONCE
OUTPATIENT
Start: 2023-06-13 | End: 2023-06-13

## 2023-06-13 RX ORDER — PREGABALIN 75 MG/1
150 CAPSULE ORAL ONCE
OUTPATIENT
Start: 2023-06-13 | End: 2023-06-13

## 2023-06-13 RX ORDER — CHLORHEXIDINE GLUCONATE 500 MG/1
CLOTH TOPICAL 2 TIMES DAILY
OUTPATIENT
Start: 2023-06-13

## 2023-06-13 NOTE — PROGRESS NOTES
"Patient: Sachin Tolliver  YOB: 1955 67 y.o. male  Medical Record Number: [id number]    Chief Complaints:   Chief Complaint   Patient presents with   • Left Knee - Follow-up       History of Present Illness:Sachin Tolliver is a 67 y.o. male who presents with complaints of increased left knee pain, he has tried and failed conservative measures including injections, physical therapy, anti-inflammatories, patient would like to proceed with surgery    Allergies: No Known Allergies    Medications:   Current Outpatient Medications   Medication Sig Dispense Refill   • aspirin 81 MG tablet Take by mouth.     • atorvastatin (LIPITOR) 40 MG tablet Take 1 tablet by mouth every night. 90 tablet 3   • butalbital-acetaminophen-caffeine (FIORICET, ESGIC) -40 MG per tablet Take 1 tablet by mouth every 6 (Six) hours as needed for headache. 25 tablet 0   • citalopram (CeleXA) 20 MG tablet Take 1 tablet by mouth Daily. 90 tablet 3   • Glucosamine-Chondroit-Vit C-Mn (GLUCOSAMINE CHONDR 1500 COMPLX PO) Take by mouth.     • Melatonin 5 MG capsule Take  by mouth At Night As Needed.     • meloxicam (Mobic) 15 MG tablet Take 1 tablet by mouth Daily. 30 tablet 3   • metoprolol tartrate (LOPRESSOR) 50 MG tablet Take 1 tablet by mouth 2 (Two) Times a Day. 180 tablet 3   • Multiple Vitamin (MULTIVITAMINS PO) Take by mouth.       No current facility-administered medications for this visit.         The following portions of the patient's history were reviewed and updated as appropriate: allergies, current medications, past family history, past medical history, past social history, past surgical history and problem list.    Review of Systems:   14 point review of systems was performed. All systems negative except pertinent positives/negatives listed in HPI above    Physical Exam:   Vitals:    06/13/23 1318   Temp: 96.6 °F (35.9 °C)   TempSrc: Temporal   Weight: 115 kg (253 lb)   Height: 188 cm (74.02\")   PainSc:   4   PainLoc: " Knee       General: A and O x 3, ASA, NAD   Skin clear no unusual lesions noted  Left knee patient has trace amount of effusion noted with 116 degrees flexion neutral in extension with a positive Chasity negative Lockman calf soft and nontender       Radiology:  Xrays 3views (ap,lateral, sunrise) left knee were reviewed that were done previously and show bone-on-bone end-stage osteoarthritis with cyst and spur formation    Assessment/Plan: End-stage osteoarthritis left knee with increased pain    Patient and I discussed options, he would like to proceed with left total knee replacement overnight stay.  Patient does have cardiac history so he will contact his cardiologist for cardiac clearance.    Continuation of conservative management vs. TKA discussed.  The patient wishes to proceed with total knee replacement.  At this point the patient has failed the full compliment of conservative treatment and stating complete understanding of the risks/benefits/ anternatives wishes to proceed with surgical treatment.    Risk and benefits of surgery were reviewed.  Including, but not limited to, blood clots or pulmonary embolism, anesthesia risk, infection, fracture, skin/leg numbness, persistent pain/crepitance/popping/catching, failure of the implant, need for future surgeries, hematoma, possible nerve or blood vessel injury, need for transfusion, and potential risk of stroke,heart attack or death, among others.  The patient understands and wishes to proceed.     It was explained that if tissue has been repaired or reconstructed, there is also an increased chance of failure which may require further management.  Following the completion of the discussion, the patient expressed understanding of this planned course of care, all their questions were answered and consent will be obtained preoperatively.    Operative Plan: [facility] Total Knee Replacement an overnight stay with home health rehab        Luz VOGT  Ted, MITCHELL  6/13/2023

## 2023-06-15 ENCOUNTER — TELEPHONE (OUTPATIENT)
Dept: CARDIOLOGY | Facility: CLINIC | Age: 68
End: 2023-06-15
Payer: MEDICARE

## 2023-06-15 NOTE — TELEPHONE ENCOUNTER
Pt needs clearance for a left total knee replacement.     LOV 08/24/2022,  next 08/21/23    Pt is on ASA 81 MG daily    Fax to:  Codi Ted, APRN   [facility]    Thanks,  [place]

## 2023-06-15 NOTE — TELEPHONE ENCOUNTER
Caller: Sachin Tolliver    Relationship: Self    Best call back number: [phone]    What is the best time to reach you: ANY    Who are you requesting to speak with (clinical staff, provider,  specific staff member): CLINICAL        What was the call regarding: PATIENT IS NEEDING CARDIAC CLEARANCE FOR LEFT KNEE REPLACEMENT SURGERY. PLEASE ADVISE PATIENT ON HOW HE NEEDS TO PROCEED.     Is it okay if the provider responds through MyChart: PLEASE CALL

## 2023-06-21 PROBLEM — M17.12 PRIMARY OSTEOARTHRITIS OF LEFT KNEE: Status: ACTIVE | Noted: 2023-06-21

## 2023-08-14 ENCOUNTER — OFFICE VISIT (OUTPATIENT)
Dept: FAMILY MEDICINE CLINIC | Facility: CLINIC | Age: 68
End: 2023-08-14
Payer: MEDICARE

## 2023-08-14 VITALS
BODY MASS INDEX: 32.24 KG/M2 | HEART RATE: 56 BPM | WEIGHT: 251.2 LBS | SYSTOLIC BLOOD PRESSURE: 142 MMHG | OXYGEN SATURATION: 97 % | DIASTOLIC BLOOD PRESSURE: 90 MMHG

## 2023-08-14 DIAGNOSIS — I10 BENIGN ESSENTIAL HYPERTENSION: Primary | ICD-10-CM

## 2023-08-14 DIAGNOSIS — Z79.899 HIGH RISK MEDICATION USE: ICD-10-CM

## 2023-08-14 DIAGNOSIS — D58.9 HEREDITARY HEMOLYTIC ANEMIA: ICD-10-CM

## 2023-08-14 DIAGNOSIS — E78.2 MIXED HYPERLIPIDEMIA: ICD-10-CM

## 2023-08-14 DIAGNOSIS — Z12.5 SCREENING FOR PROSTATE CANCER: ICD-10-CM

## 2023-08-14 LAB
ALBUMIN SERPL-MCNC: 4.7 G/DL (ref 3.5–5.2)
ALBUMIN/GLOB SERPL: 2.2 G/DL
ALP SERPL-CCNC: 68 U/L (ref 39–117)
ALT SERPL-CCNC: 17 U/L (ref 1–41)
AST SERPL-CCNC: 21 U/L (ref 1–40)
BILIRUB SERPL-MCNC: 0.9 MG/DL (ref 0–1.2)
BUN SERPL-MCNC: 14 MG/DL (ref 8–23)
BUN/CREAT SERPL: 12.5 (ref 7–25)
CALCIUM SERPL-MCNC: 9.9 MG/DL (ref 8.6–10.5)
CHLORIDE SERPL-SCNC: 103 MMOL/L (ref 98–107)
CHOLEST SERPL-MCNC: 146 MG/DL (ref 0–200)
CO2 SERPL-SCNC: 27.5 MMOL/L (ref 22–29)
CREAT SERPL-MCNC: 1.12 MG/DL (ref 0.76–1.27)
EGFRCR SERPLBLD CKD-EPI 2021: 72.9 ML/MIN/1.73
GLOBULIN SER CALC-MCNC: 2.1 GM/DL
GLUCOSE SERPL-MCNC: 91 MG/DL (ref 65–99)
HDLC SERPL-MCNC: 46 MG/DL (ref 40–60)
LDLC SERPL CALC-MCNC: 83 MG/DL (ref 0–100)
LDLC/HDLC SERPL: 1.79 {RATIO}
POTASSIUM SERPL-SCNC: 4.7 MMOL/L (ref 3.5–5.2)
PROT SERPL-MCNC: 6.8 G/DL (ref 6–8.5)
SODIUM SERPL-SCNC: 140 MMOL/L (ref 136–145)
TRIGL SERPL-MCNC: 88 MG/DL (ref 0–150)
VLDLC SERPL CALC-MCNC: 17 MG/DL (ref 5–40)

## 2023-08-14 NOTE — PROGRESS NOTES
"Subsequent Medicare Wellness Visit     Sachin Tolliver is a 67 y.o. male who presents for a Subsequent Medicare Wellness Visit.    Compared to one year ago, the patient feels his physical health is worse due to his knee and his mental health is the same.  Recent Hospitalizations:  He was not admitted to the hospital during the last year.   Current Medical Providers:  Patient Care Team:  Milvia Briceno MD as PCP - General  Иван rIene MD as Consulting Physician (Cardiology)  Luz Jean APRN as Nurse Practitioner (Nurse Practitioner)  No opioid medication identified on active medication list. I have reviewed chart for other potential  high risk medication/s and harmful drug interactions in the elderly.  Aspirin is on active medication list. Aspirin use is indicated based on review of current medical conditions. Benefits of this medication outweigh potential harm.   Advance Care Planning   Advance Directive is on file.  ACP discussion was held with the patient during this visit. Patient has an advance directive in EMR which is still valid.      Estimated body mass index is 32.24 kg/mý as calculated from the following:    Height as of 6/13/23: 188 cm (74.02\").    Weight as of this encounter: 114 kg (251 lb 3.2 oz).  BMI is >= 30 and <35. (Class 1 Obesity). The following options were offered after discussion;: weight loss educational material (shared in after visit summary)     Does the patient have evidence of cognitive impairment? No  HEALTH RISK ASSESSMENT  Smoking Status:  Social History     Tobacco Use   Smoking Status Never   Smokeless Tobacco Never     Alcohol Consumption:  Social History     Substance and Sexual Activity   Alcohol Use Yes    Alcohol/week: 4.0 standard drinks    Types: 4 Cans of beer per week    Comment: Caffeine Use     Fall Risk Screen:  STEADI Fall Risk Assessment was completed, and patient is at LOW risk for falls.Assessment completed on:8/14/2023  Depression Screening:      " 2023    11:27 AM   PHQ-2/PHQ-9 Depression Screening   Little Interest or Pleasure in Doing Things 0-->not at all   Feeling Down, Depressed or Hopeless 0-->not at all   PHQ-9: Brief Depression Severity Measure Score 0     Health Habits and Functional and Cognitive Screenin/14/2023    11:25 AM   Functional & Cognitive Status   Do you have difficulty preparing food and eating? No   Do you have difficulty bathing yourself, getting dressed or grooming yourself? No   Do you have difficulty using the toilet? No   Do you have difficulty moving around from place to place? YesNeeds a knee replacement   Do you have trouble with steps or getting out of a bed or a chair? Yes   Dental Exam Up to date   Eye Exam Up to date   Do you need help using the phone?  No   Are you deaf or do you have serious difficulty hearing?  No   Do you need help to go to places out of walking distance? No   Do you need help shopping? No   Do you need help preparing meals?  No   Do you need help with housework?  No   Do you need help with laundry? No   Do you need help taking your medications? No   Do you need help managing money? No   Do you ever drive or ride in a car without wearing a seat belt? No   Have you felt unusual stress, anger or loneliness in the last month? No   Who do you live with? Spouse   If you need help, do you have trouble finding someone available to you? No   Have you been bothered in the last four weeks by sexual problems? No   Do you have difficulty concentrating, remembering or making decisions? No       Health Habits  Dental Exam: Up to date  Eye Exam: Up to date  Age-appropriate Screening Schedule:  Refer to the list below for future screening recommendations based on patient's age, sex and/or medical conditions. Orders for these recommended tests are listed in the plan section. The patient has been provided with a written plan.  Health Maintenance   Topic Date Due    COVID-19 Vaccine (6 - Pfizer series)  02/07/2023    ANNUAL WELLNESS VISIT  08/12/2023    LIPID PANEL  08/12/2023    INFLUENZA VACCINE  10/01/2023    COLONOSCOPY  02/10/2030    TDAP/TD VACCINES (3 - Td or Tdap) 12/02/2030    HEPATITIS C SCREENING  Completed    Pneumococcal Vaccine 65+  Completed    ZOSTER VACCINE  Completed        CMS Preventative Services Quick Reference  Risk Factors Identified During Encounter  None Identified  The above risks/problems have been discussed with the patient.  Follow up actions/plans if indicated are seen below in the Assessment/Plan Section.  Pertinent information has been shared with the patient in the After Visit Summary.  Patient Instructions        Follow Up: Medicare visit in one year  An After Visit Summary and PPPS were given/sent to the patient.    Patient has multiple medical problems which are significant and separately identifiable that require additional work above and beyond the Medicare Wellness Visit. These are not trivial or insignificant and are billed with a 25-modifier  Problem Visit:  Sachin is a 67 y.o. also being seen today for hypertension, hyperlipidemia   Subjective   History of the Present Illness   No complaints.  Taking meds as ordered.  No headache, chest pain or shortness of breath.   Social History  He  reports that he has never smoked. He has never used smokeless tobacco. He reports current alcohol use of about 4.0 standard drinks per week. He reports that he does not use drugs.  Objective   Vital Signs        BP Readings from Last 1 Encounters:   08/14/23 142/90     Wt Readings from Last 3 Encounters:   08/14/23 114 kg (251 lb 3.2 oz)   06/13/23 115 kg (253 lb)   04/25/23 115 kg (254 lb 6.4 oz)   Body mass index is 32.24 kg/mý.     Physical Exam  Vitals reviewed.   Constitutional:       Appearance: Normal appearance.   Cardiovascular:      Rate and Rhythm: Normal rate and regular rhythm.      Heart sounds: Murmur (2/6 LUSB ALVARO) heard.   Pulmonary:      Effort: Pulmonary effort is normal.       Breath sounds: Normal breath sounds. No wheezing.   Neurological:      Mental Status: He is alert and oriented to person, place, and time.   Psychiatric:         Mood and Affect: Mood normal.         Behavior: Behavior normal.         Thought Content: Thought content normal.         Judgment: Judgment normal.         Assessment and Plan       Problem List Items Addressed This Visit          Cardiac and Vasculature    Benign essential hypertension - Primary    Overview     [facility] [place] 8/14/2023  BP is controlled well. He will recheck in six months. He will continue present meds. Prescription will be sent when notified by pharmacy..             Hyperlipidemia    Overview     [facility] 8/14/2023 Labs are drawn today          Relevant Orders    Comprehensive Metabolic Panel    Lipid Panel With LDL / HDL Ratio       Hematology and Neoplasia    Hereditary hemolytic anemia    Overview     Description: He's had only one episode of hemolytic anemia. His father had a similar problem. His dad had a lot of issues with that. He used to be jaundiced.         Current Assessment & Plan     [facility] 8/14/2023  Labs are drawn today.             Other Visit Diagnoses       High risk medication use        Relevant Orders    CBC & Differential    Screening for prostate cancer        Relevant Orders    PSA Screen                 Patient was given instructions and counseling regarding his condition or for health maintenance advice. Please see specific information pulled into the AVS if appropriate.

## 2023-08-14 NOTE — PATIENT INSTRUCTIONS
"Mediterranean diet: A heart-healthy eating plan  The heart-healthy Mediterranean diet is a healthy eating plan based on typical foods and recipes of Mediterranean-style cooking. Here's how to adopt the Mediterranean diet.  By [facility] Staff  If you're looking for a heart-healthy eating plan, the Mediterranean diet might be right for you.    The Mediterranean diet incorporates the basics of healthy eating -- plus a splash of flavorful olive oil and perhaps a glass of red wine -- among other components characterizing the traditional cooking style of countries bordering the [place].Most healthy diets include fruits, vegetables, fish and whole grains, and limit unhealthy fats. While these parts of a healthy diet are tried-and-true, subtle variations or differences in proportions of certain foods may make a difference in your risk of heart disease.    Benefits of the Mediterranean diet  Research has shown that the traditional Mediterranean diet reduces the risk of heart disease. The diet has been associated with a lower level of oxidized low-density lipoprotein (LDL) cholesterol -- the \"bad\" cholesterol that's more likely to build up deposits in your arteries. In fact, a meta-analysis of more than 1.5 million healthy adults demonstrated that following a Mediterranean diet was associated with a reduced risk of cardiovascular mortality as well as overall mortality.    The Mediterranean diet is also associated with a reduced incidence of cancer, and Parkinson's and Alzheimer's diseases. Women who eat a Mediterranean diet supplemented with extra-virgin olive oil and mixed nuts may have a reduced risk of breast cancer.For these reasons, most if not all major scientific organizations encourage healthy adults to adapt a style of eating like that of the Mediterranean diet for prevention of major chronic diseases.    Key components of the Mediterranean diet    The Mediterranean diet emphasizes:     Eating primarily " "plant-based foods, such as fruits and vegetables, whole grains, legumes and nuts   Replacing butter with healthy fats such as olive oil and canola oil   Using herbs and spices instead of salt to flavor foods   Limiting red meat to no more than a few times a month   Eating fish and poultry at least twice a week   Enjoying meals with family and friends   Drinking red wine in moderation (optional)   Getting plenty of exercise   Fruits, vegetables, nuts and grains    The Mediterranean diet traditionally includes fruits, vegetables, pasta and rice. For example, residents of [facility] eat very little red meat and average nine servings a day of antioxidant-rich fruits and vegetables.  Grains in the Mediterranean region are typically whole grain and usually contain very few unhealthy trans fats, and bread is an important part of the diet there. However, throughout the Mediterranean region, bread is eaten plain or dipped in olive oil -- not eaten with butter or margarines, which contain saturated or trans fats.  Nuts are another part of a healthy Mediterranean diet. Nuts are high in fat (approximately 80 percent of their calories come from fat), but most of the fat is not saturated. Because nuts are high in calories, they should not be eaten in large amounts -- generally no more than a handful a day. Avoid candied or honey-roasted and heavily salted nuts.    Healthy fats  The focus of the Mediterranean diet isn't on limiting total fat consumption, but rather to make whittaker choices about the types of fat you eat. The Mediterranean diet discourages saturated fats and hydrogenated oils (trans fats), both of which contribute to heart disease.The Mediterranean diet features olive oil as the primary source of fat. Olive oil provides monounsaturated fat -- a type of fat that can help reduce LDL cholesterol levels when used in place of saturated or trans fats.\"Extra-virgin\" and \"virgin\" olive oils -- the least processed forms -- also " contain the highest levels of the protective plant compounds that provide antioxidant effects.    Monounsaturated fats and polyunsaturated fats, such as canola oil and some nuts, contain the beneficial linolenic acid (a type of omega-3 fatty acid). Omega-3 fatty acids lower triglycerides, decrease blood clotting, are associated with decreased sudden heart attack, improve the health of your blood vessels, and help moderate blood pressure. Fatty fish -- such as mackerel, lake trout, herring, sardines, albacore tuna and salmon -- are rich sources of omega-3 fatty acids. Fish is eaten on a regular basis in the Mediterranean diet.    Wine    The health effects of alcohol have been debated for many years, and some doctors are reluctant to encourage alcohol consumption because of the health consequences of excessive drinking.However, alcohol -- in moderation -- has been associated with a reduced risk of heart disease in some research studies.    The Mediterranean diet typically includes a moderate amount of wine. This means no more than 5 ounces (148 milliliters) of wine daily for women (or men over age 65), and no more than 10 ounces (296 milliliters) of wine daily for men under age 65. If you're unable to limit your alcohol intake to the amounts defined above, if you have a personal or family history of alcohol abuse, or if you have heart or liver disease, refrain from drinking wine or any other alcohol.    Putting it all together    The Mediterranean diet is a delicious and healthy way to eat. Many people who switch to this style of eating say they'll never eat any other way. Here are some specific steps to get you started:    Eat your veggies and fruits -- and switch to whole grains. An abundance and variety of plant foods should make up the majority of your meals. Strive for seven to 10 servings a day of veggies and fruits. Switch to whole-grain bread and cereal, and begin to eat more whole-gain rice and pasta  products.  Go nuts. Keep almonds, cashews, pistachios and walnuts on hand for a quick snack. Choose natural peanut butter, rather than the kind with hydrogenated fat added. Try tahini (blended sesame seeds) as a dip or spread for bread.  Pass on the butter. Try olive or canola oil as a healthy replacement for butter or margarine. Use it in cooking. Dip bread in flavored olive oil or lightly spread it on whole-grain bread for a tasty alternative to butter. Or try tahini as a dip or spread.  Spice it up. Herbs and spices make food tasty and are also rich in health-promoting substances. Season your meals with herbs and spices rather than salt.  Go fish. Eat fish once or twice a week. Fresh or water-packed tuna, salmon, trout, mackerel and herring are healthy choices. Grilled fish tastes good and requires little cleanup. Avoid fried fish, unless it's sauteed in a small amount of canola oil.  Rein in the red meat. Substitute fish and poultry for red meat. When eaten, make sure it's lean and keep portions small (about the size of a deck of cards). Also avoid sausage, hilton and other high-fat meats.  Choose low-fat dairy. Limit higher fat dairy products such as whole or 2 percent milk, cheese and ice cream. Switch to skim milk, fat-free yogurt and low-fat cheese.  Raise a glass to healthy eating. If it's OK with your doctor, have a glass of wine at dinner. If you don't drink alcohol, you don't need to start. Drinking purple grape juice may be an alternative to wine.

## 2023-08-15 LAB
BASOPHILS # BLD AUTO: 0.07 10*3/MM3 (ref 0–0.2)
BASOPHILS NFR BLD AUTO: 1.2 % (ref 0–1.5)
EOSINOPHIL # BLD AUTO: 0.16 10*3/MM3 (ref 0–0.4)
EOSINOPHIL NFR BLD AUTO: 2.8 % (ref 0.3–6.2)
ERYTHROCYTE [DISTWIDTH] IN BLOOD BY AUTOMATED COUNT: 12.5 % (ref 12.3–15.4)
HCT VFR BLD AUTO: 42.5 % (ref 37.5–51)
HGB BLD-MCNC: 14.3 G/DL (ref 13–17.7)
IMM GRANULOCYTES # BLD AUTO: 0.01 10*3/MM3 (ref 0–0.05)
IMM GRANULOCYTES NFR BLD AUTO: 0.2 % (ref 0–0.5)
LYMPHOCYTES # BLD AUTO: 1.79 10*3/MM3 (ref 0.7–3.1)
LYMPHOCYTES NFR BLD AUTO: 31.8 % (ref 19.6–45.3)
MCH RBC QN AUTO: 29.9 PG (ref 26.6–33)
MCHC RBC AUTO-ENTMCNC: 33.6 G/DL (ref 31.5–35.7)
MCV RBC AUTO: 88.9 FL (ref 79–97)
MONOCYTES # BLD AUTO: 0.54 10*3/MM3 (ref 0.1–0.9)
MONOCYTES NFR BLD AUTO: 9.6 % (ref 5–12)
NEUTROPHILS # BLD AUTO: 3.06 10*3/MM3 (ref 1.7–7)
NEUTROPHILS NFR BLD AUTO: 54.4 % (ref 42.7–76)
NRBC BLD AUTO-RTO: 0 /100 WBC (ref 0–0.2)
PLATELET # BLD AUTO: 218 10*3/MM3 (ref 140–450)
PSA SERPL-MCNC: 2.44 NG/ML (ref 0–4)
RBC # BLD AUTO: 4.78 10*6/MM3 (ref 4.14–5.8)
WBC # BLD AUTO: 5.63 10*3/MM3 (ref 3.4–10.8)

## 2023-08-21 ENCOUNTER — HOSPITAL ENCOUNTER (OUTPATIENT)
Dept: CARDIOLOGY | Facility: HOSPITAL | Age: 68
Discharge: HOME | End: 2023-08-21
Admitting: NURSE PRACTITIONER
Payer: MEDICARE

## 2023-08-21 ENCOUNTER — OFFICE VISIT (OUTPATIENT)
Dept: CARDIOLOGY | Facility: CLINIC | Age: 68
End: 2023-08-21
Payer: MEDICARE

## 2023-08-21 VITALS
DIASTOLIC BLOOD PRESSURE: 80 MMHG | OXYGEN SATURATION: 95 % | BODY MASS INDEX: 32.6 KG/M2 | SYSTOLIC BLOOD PRESSURE: 118 MMHG | WEIGHT: 254 LBS | HEIGHT: 74 IN | HEART RATE: 70 BPM

## 2023-08-21 VITALS
SYSTOLIC BLOOD PRESSURE: 118 MMHG | DIASTOLIC BLOOD PRESSURE: 80 MMHG | BODY MASS INDEX: 32.61 KG/M2 | HEART RATE: 70 BPM | HEIGHT: 74 IN

## 2023-08-21 DIAGNOSIS — I10 BENIGN ESSENTIAL HYPERTENSION: ICD-10-CM

## 2023-08-21 DIAGNOSIS — Z95.3 S/P AORTIC VALVE REPLACEMENT WITH TISSUE: ICD-10-CM

## 2023-08-21 DIAGNOSIS — Z01.810 PRE-OPERATIVE CARDIOVASCULAR EXAMINATION: ICD-10-CM

## 2023-08-21 DIAGNOSIS — I25.810 CORONARY ARTERY DISEASE INVOLVING CORONARY BYPASS GRAFT OF NATIVE HEART WITHOUT ANGINA PECTORIS: Primary | ICD-10-CM

## 2023-08-21 DIAGNOSIS — Z95.1 S/P CABG X 1: ICD-10-CM

## 2023-08-21 LAB
AORTIC ARCH: 2.7 CM
AORTIC DIMENSIONLESS INDEX: 0.3 (DI)
ASCENDING AORTA: 4.1 CM
BH CV ECHO AV AORTIC VALVE AT ACCEL TIME CALCULATED: 90 MSEC
BH CV ECHO MEAS - AO MAX PG: 35.8 MMHG
BH CV ECHO MEAS - AO MEAN PG: 23.2 MMHG
BH CV ECHO MEAS - AO ROOT DIAM: 3.7 CM
BH CV ECHO MEAS - AO V2 MAX: 299.1 CM/SEC
BH CV ECHO MEAS - AO V2 VTI: 75.3 CM
BH CV ECHO MEAS - AT: 0.09 SEC
BH CV ECHO MEAS - AVA(I,D): 1.06 CM2
BH CV ECHO MEAS - EDV(CUBED): 116.3 ML
BH CV ECHO MEAS - EDV(MOD-SP2): 115 ML
BH CV ECHO MEAS - EDV(MOD-SP4): 139 ML
BH CV ECHO MEAS - EF(MOD-BP): 56 %
BH CV ECHO MEAS - EF(MOD-SP2): 49.6 %
BH CV ECHO MEAS - EF(MOD-SP4): 61.2 %
BH CV ECHO MEAS - ESV(CUBED): 45.9 ML
BH CV ECHO MEAS - ESV(MOD-SP2): 58 ML
BH CV ECHO MEAS - ESV(MOD-SP4): 54 ML
BH CV ECHO MEAS - FS: 26.7 %
BH CV ECHO MEAS - IVS/LVPW: 1.05 CM
BH CV ECHO MEAS - IVSD: 1.3 CM
BH CV ECHO MEAS - LAT PEAK E' VEL: 7.5 CM/SEC
BH CV ECHO MEAS - LV DIASTOLIC VOL/BSA (35-75): 57.8 CM2
BH CV ECHO MEAS - LV MASS(C)D: 243.3 GRAMS
BH CV ECHO MEAS - LV MAX PG: 3 MMHG
BH CV ECHO MEAS - LV MEAN PG: 1.92 MMHG
BH CV ECHO MEAS - LV SYSTOLIC VOL/BSA (12-30): 22.4 CM2
BH CV ECHO MEAS - LV V1 MAX: 87 CM/SEC
BH CV ECHO MEAS - LV V1 VTI: 20.2 CM
BH CV ECHO MEAS - LVIDD: 4.9 CM
BH CV ECHO MEAS - LVIDS: 3.6 CM
BH CV ECHO MEAS - LVOT AREA: 4 CM2
BH CV ECHO MEAS - LVOT DIAM: 2.25 CM
BH CV ECHO MEAS - LVPWD: 1.24 CM
BH CV ECHO MEAS - MED PEAK E' VEL: 5.3 CM/SEC
BH CV ECHO MEAS - MV A DUR: 0.11 SEC
BH CV ECHO MEAS - MV A MAX VEL: 47.1 CM/SEC
BH CV ECHO MEAS - MV DEC SLOPE: 272.1 CM/SEC2
BH CV ECHO MEAS - MV DEC TIME: 0.18 MSEC
BH CV ECHO MEAS - MV E MAX VEL: 57.4 CM/SEC
BH CV ECHO MEAS - MV E/A: 1.22
BH CV ECHO MEAS - MV MAX PG: 1.27 MMHG
BH CV ECHO MEAS - MV MEAN PG: 0.6 MMHG
BH CV ECHO MEAS - MV P1/2T: 53.3 MSEC
BH CV ECHO MEAS - MV V2 VTI: 24.3 CM
BH CV ECHO MEAS - MVA(P1/2T): 4.1 CM2
BH CV ECHO MEAS - MVA(VTI): 3.3 CM2
BH CV ECHO MEAS - PA ACC TIME: 0.09 SEC
BH CV ECHO MEAS - PA V2 MAX: 152 CM/SEC
BH CV ECHO MEAS - PULM A REVS DUR: 0.1 SEC
BH CV ECHO MEAS - PULM A REVS VEL: 19.6 CM/SEC
BH CV ECHO MEAS - PULM DIAS VEL: 20 CM/SEC
BH CV ECHO MEAS - PULM S/D: 1.59
BH CV ECHO MEAS - PULM SYS VEL: 31.8 CM/SEC
BH CV ECHO MEAS - RAP SYSTOLE: 3 MMHG
BH CV ECHO MEAS - RVOT DIAM: 2.11 CM
BH CV ECHO MEAS - RVSP: 26.9 MMHG
BH CV ECHO MEAS - SI(MOD-SP2): 23.7 ML/M2
BH CV ECHO MEAS - SI(MOD-SP4): 35.3 ML/M2
BH CV ECHO MEAS - SUP REN AO DIAM: 2.2 CM
BH CV ECHO MEAS - SV(LVOT): 80.1 ML
BH CV ECHO MEAS - SV(MOD-SP2): 57 ML
BH CV ECHO MEAS - SV(MOD-SP4): 85 ML
BH CV ECHO MEAS - TAPSE (>1.6): 2.07 CM
BH CV ECHO MEAS - TR MAX PG: 23.9 MMHG
BH CV ECHO MEAS - TR MAX VEL: 244.3 CM/SEC
BH CV ECHO MEASUREMENTS AVERAGE E/E' RATIO: 8.97
BH CV XLRA - RV BASE: 3.5 CM
BH CV XLRA - RV LENGTH: 8.3 CM
BH CV XLRA - RV MID: 3.5 CM
BH CV XLRA - TDI S': 7.6 CM/SEC
LEFT ATRIUM VOLUME INDEX: 32.9 ML/M2
SINUS: 4 CM
STJ: 4.1 CM

## 2023-08-21 PROCEDURE — 3074F SYST BP LT 130 MM HG: CPT | Performed by: NURSE PRACTITIONER

## 2023-08-21 PROCEDURE — 25510000001 PERFLUTREN (DEFINITY) 8.476 MG IN SODIUM CHLORIDE (PF) 0.9 % 10 ML INJECTION: Performed by: NURSE PRACTITIONER

## 2023-08-21 PROCEDURE — 93306 TTE W/DOPPLER COMPLETE: CPT

## 2023-08-21 PROCEDURE — 99214 OFFICE O/P EST MOD 30 MIN: CPT | Performed by: NURSE PRACTITIONER

## 2023-08-21 PROCEDURE — 3079F DIAST BP 80-89 MM HG: CPT | Performed by: NURSE PRACTITIONER

## 2023-08-21 PROCEDURE — 1160F RVW MEDS BY RX/DR IN RCRD: CPT | Performed by: NURSE PRACTITIONER

## 2023-08-21 PROCEDURE — 93306 TTE W/DOPPLER COMPLETE: CPT | Performed by: INTERNAL MEDICINE

## 2023-08-21 PROCEDURE — 1159F MED LIST DOCD IN RCRD: CPT | Performed by: NURSE PRACTITIONER

## 2023-08-21 PROCEDURE — 93000 ELECTROCARDIOGRAM COMPLETE: CPT | Performed by: NURSE PRACTITIONER

## 2023-08-21 RX ADMIN — PERFLUTREN 1.5 ML: 6.52 INJECTION, SUSPENSION INTRAVENOUS at 08:52

## 2023-08-21 NOTE — LETTER
CARDIOLOGY    Patient Name: Sachin Tolliver  :1955  Age: 68 y.o.    23      To whom it may concern:    Sachin Tolliver was referred to my office for cardiovascular risk assessment exam for upcoming total knee replacement.    From a cardiovascular standpoint, the patient is at the following risk of major cardiovascular event in the shaista-operative setting:    [x] Low         [] Modifiable  [] Low-Moderate       [x] Non-Modifiable   [] Moderate  [] Moderare - high  [] High    Cardiac Testing:    [] Needs further cardiac testing  [x] Does not need further cardiac testing    Anticoagulant Status:     [] No anticoagulants    [x] The patient is on  [x] ASA; hold for 5-7 days.  [] Coumadin; hold for ___ days.  [] Plavix; hold for ___ days.  [] Eliquis; hold for ___ days.  [] Brilinta; hold for ___ days.  [] Xarelto; hold for ___ days.  [] Effient; hold for ___ days.    [] The patient requires Lovenox bridging, which has been prescribed.     If there are any problems during surgery, please do not hesitate to contact my office.    Thank you for allowing me to participate in this patient's care.    Sincerely,    MITCHELL Barrientos  [facility], [place]

## 2023-08-21 NOTE — PROGRESS NOTES
"    CARDIOLOGY        Patient Name: Sachin Tolliver  :1955  Age: 68 y.o.  Primary Cardiologist: Иван Irene MD  Encounter Provider:  MITCHELL Barrientos    Date of Service: 23        CHIEF COMPLAINT / REASON FOR OFFICE VISIT     Coronary Artery Disease (2022 Follow up)      HISTORY OF PRESENT ILLNESS       Coronary Artery Disease  Pertinent negatives include no leg swelling or weight gain.     Sachin Tolliver is a 68 y.o. male who presents today for annual follow up.     Pt has a  history significant for CAD with prior CABG, aortic valve replacement secondary to aortic valve stenosis.    Patient reports that he has done well since the last assessment.  Reports that he is unable to exercise secondary to knee pain.  He is scheduled for a total knee replacement on 2023 with Dr. Martinez.  He currently is asymptomatic and denies chest pain, dyspnea at rest or with exertion, palpitations, lightheadedness, swelling, fatigue.      The following portions of the patient's history were reviewed and updated as appropriate: allergies, current medications, past family history, past medical history, past social history, past surgical history and problem list.      VITAL SIGNS     Visit Vitals  /80 (BP Location: Left arm)   Pulse 70   Ht 188 cm (74\")   BMI 32.61 kg/mý         Wt Readings from Last 3 Encounters:   23 115 kg (254 lb)   23 115 kg (254 lb 8 oz)   23 114 kg (251 lb 3.2 oz)     Body mass index is 32.61 kg/mý.      REVIEW OF SYSTEMS   Review of Systems   Constitutional: Negative for chills, fever, weight gain and weight loss.   Cardiovascular:  Negative for leg swelling.   Respiratory:  Negative for cough, snoring and wheezing.    Hematologic/Lymphatic: Negative for bleeding problem. Does not bruise/bleed easily.   Skin:  Negative for color change.   Musculoskeletal:  Positive for joint pain. Negative for falls and myalgias.   Gastrointestinal:  Negative for melena. "   Genitourinary:  Negative for hematuria.   Neurological:  Negative for excessive daytime sleepiness and light-headedness.   Psychiatric/Behavioral:  Negative for depression. The patient is not nervous/anxious.          PHYSICAL EXAMINATION     Constitutional:       Appearance: Normal appearance. Well-developed.   Eyes:      Conjunctiva/sclera: Conjunctivae normal.   Neck:      Vascular: No carotid bruit.   Pulmonary:      Effort: Pulmonary effort is normal.      Breath sounds: Normal breath sounds.   Cardiovascular:      Normal rate. Regular rhythm. Normal S1. Normal S2.       Murmurs: There is no murmur.      No gallop.  No click. No rub.   Edema:     Peripheral edema absent.   Musculoskeletal: Normal range of motion. Skin:     General: Skin is warm and dry.   Neurological:      Mental Status: Alert and oriented to person, place, and time.      GCS: GCS eye subscore is 4. GCS verbal subscore is 5. GCS motor subscore is 6.   Psychiatric:         Speech: Speech normal.         Behavior: Behavior normal.         Thought Content: Thought content normal.         Judgment: Judgment normal.         REVIEWED DATA       ECG 12 Lead    Date/Time: 8/21/2023 9:03 AM  Performed by: Nicci Napier APRN  Authorized by: Nicci Napier APRN   Comparison: compared with previous ECG from 8/24/2022  Rhythm: sinus rhythm  Rate: normal  BPM: 50  Conduction: non-specific intraventricular conduction delay  ST Segments: ST segments normal  T Waves: T waves normal  QRS axis: normal    Clinical impression: non-specific ECG        Cardiac Procedures:  Echocardiogram 5/30/2017. LVEF 61%. Borderline LVH. Grade 1 diastolic dysfunction. Trace aortic valve regurgitation. Moderate to severe aortic valve stenosis. In mobile right coronary cusp. Mean pressure gradient 44.6 mmHg.  Echocardiogram 6/13/2018. LVEF 65%. LV cavity is moderate to severely dilated. LV wall thickness is consistent with mild hypertrophy. Moderate to severe aortic  valve stenosis. Maximum pressure gradient 83.2 mmHg, mean pressure gradient 46.1 mmHg. Compared to 5/30/2017 gradients have not changed significantly however the LV is now dilated.  Cardiac catheterization 7/10/2018. Aortic valve stenosis, normal pulmonary pressures, small single small vessel CAD. Admit for surgical AVR.  Echocardiogram 9/14/2018. LVEF 52%. Normal LV cavity size and wall thickness. All LV wall segments contract normally. Diastolic function is normal. Porcine bioprosthetic valve present, prosthetic valve is normal. Trace to mild tricuspid valve regurgitation. Estimated RVSP less than 35 mmHg.  Echocardiogram 1/9/2020. LVEF 57%. Normal LV cavity size and wall thickness. All LV wall segments normally. 29 mm porcine bioprosthetic valve. Peak and mean gradients are within defined limits. No significant prosthetic valve stenosis or regurgitation. Trace mitral valve regurgitation. Mild tricuspid valve regurgitation. Calculated RVSP 24 mmHg. Mild dilation of ascending aorta. Ascending aorta measures 3.8 cm.  Myocardial perfusion stress test 6/2/2020. Normal myocardial perfusion study without evidence of ischemia. Impressions consistent with low risk study.      Lipid Panel          8/14/2023    12:02   Lipid Panel   Total Cholesterol 146    Triglycerides 88    HDL Cholesterol 46    VLDL Cholesterol 17    LDL Cholesterol  83    LDL/HDL Ratio 1.79      BUN   Date Value Ref Range Status   08/14/2023 14 8 - 23 mg/dL Final   08/24/2018 13 8 - 23 mg/dL Final     Creatinine   Date Value Ref Range Status   08/14/2023 1.12 0.76 - 1.27 mg/dL Final   12/27/2019 1.10 0.60 - 1.30 mg/dL Final     Comment:     Serial Number: [id number]:  [id number]     Potassium   Date Value Ref Range Status   08/14/2023 4.7 3.5 - 5.2 mmol/L Final   08/24/2018 4.3 3.5 - 5.2 mmol/L Final     ALT (SGPT)   Date Value Ref Range Status   08/14/2023 17 1 - 41 U/L Final   08/24/2018 49 (H) 1 - 41 U/L Final     AST (SGOT)   Date Value Ref Range  Status   08/14/2023 21 1 - 40 U/L Final   08/24/2018 26 1 - 40 U/L Final           ASSESSMENT & PLAN     Diagnoses and all orders for this visit:    1. Coronary artery disease involving coronary bypass graft of native heart without angina pectoris (Primary)  -     ECG 12 Lead  2. S/P CABG x 1  Currently denies angina or dyspnea  ECG is nonischemic  Continue metoprolol tartrate, atorvastatin, aspirin    3. S/P aortic valve replacement with tissue  Gradient stable on echocardiogram in clinic today  Asymptomatic    4. Benign essential hypertension  BP controlled at 118/80  Continue metoprolol tartrate 50 mg twice daily  Ascending aorta mildly dilated at 4.1 cm, stressed importance of good blood pressure control.  We will monitor in the future on echocardiograms    5. Pre-operative cardiovascular examination  Patient did need for total knee replacement which is scheduled on 8/30/2023.  Patient is of low risk of major cardiovascular episode in the perioperative setting.  He will need to hold aspirin 5 to 7 days prior.        Return in about 1 year (around 8/21/2024) for Dr. Irene- Routine.    Future Appointments         Provider Department Center    8/24/2023 1:30 PM Luz Jean APRN [facility]    9/19/2023 8:40 AM Jesse Martinez MD [facility] [place]                MEDICATIONS         Discharge Medications            Accurate as of August 21, 2023  9:22 AM. If you have any questions, ask your nurse or doctor.                Continue These Medications        Instructions Start Date   aspirin 81 MG tablet   Oral, HOLD PRIOR TO SURGERY PER MD INSTRUCTIONS      atorvastatin 40 MG tablet  Commonly known as: LIPITOR   Take 1 tablet by mouth every night.      butalbital-acetaminophen-caffeine -40 MG per tablet  Commonly known as: FIORICET, ESGIC   Take 1 tablet by mouth every 6 (Six) hours as needed for headache.      Chlorhexidine Gluconate Cloth 2 %  pads   Apply externally      citalopram 20 MG tablet  Commonly known as: CeleXA   20 mg, Oral, Daily      metoprolol tartrate 50 MG tablet  Commonly known as: LOPRESSOR   50 mg, Oral, 2 Times Daily             Stop These Medications      GLUCOSAMINE CHONDR 1500 COMPLX PO  Stopped by: MITCHELL Barrientos     Melatonin 5 MG capsule  Stopped by: MITCHELL Barrientos     meloxicam 15 MG tablet  Commonly known as: Mobic  Stopped by: MITCHELL Barrientos     MULTIVITAMINS PO  Stopped by: MITCHELL Barrientos                  **Dragon Disclaimer:   Much of this encounter note is an electronic transcription/translation of spoken language to printed text. The electronic translation of spoken language may permit erroneous, or at times, nonsensical words or phrases to be inadvertently transcribed. Although I have reviewed the note for such errors, some may still exist.

## 2023-08-24 ENCOUNTER — OFFICE VISIT (OUTPATIENT)
Dept: ORTHOPEDIC SURGERY | Facility: CLINIC | Age: 68
End: 2023-08-24
Payer: MEDICARE

## 2023-08-24 VITALS
BODY MASS INDEX: 32.44 KG/M2 | SYSTOLIC BLOOD PRESSURE: 140 MMHG | DIASTOLIC BLOOD PRESSURE: 72 MMHG | WEIGHT: 252.8 LBS | TEMPERATURE: 97.7 F | HEIGHT: 74 IN

## 2023-08-24 DIAGNOSIS — R52 PAIN: Primary | ICD-10-CM

## 2023-08-24 NOTE — H&P (VIEW-ONLY)
Patient: Sachin Tolliver    Date of Admission: 8/30/2023    YOB: 1955    Medical Record Number: [id number]    Admitting Physician: Dr. Jesse Martinez    Reason for Admission: End Stage Left Knee OA    History of Present Illness: 68 y.o. male presents with severe end stage knee osteoarthritis which has not been responsive to the full compliment of conservative measures. Despite conservative attempts, there is still severe, constant activity-limiting pain. Given the severity of the pain, the patient has elected to proceed with knee replacement.    Allergies: No Known Allergies      Current Medications:  Home Medications:    Current Outpatient Medications on File Prior to Visit   Medication Sig    aspirin 81 MG tablet Take  by mouth. HOLD PRIOR TO SURGERY PER MD INSTRUCTIONS    atorvastatin (LIPITOR) 40 MG tablet Take 1 tablet by mouth every night.    butalbital-acetaminophen-caffeine (FIORICET, ESGIC) -40 MG per tablet Take 1 tablet by mouth every 6 (Six) hours as needed for headache.    Chlorhexidine Gluconate Cloth 2 % pads Apply  topically.    citalopram (CeleXA) 20 MG tablet Take 1 tablet by mouth Daily.    metoprolol tartrate (LOPRESSOR) 50 MG tablet Take 1 tablet by mouth 2 (Two) Times a Day.     No current facility-administered medications on file prior to visit.     PRN Meds:.    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 Vidales    Tennis elbow     Tricuspid regurgitation     Trace       PF/Surg/Soc Hx:     Past Surgical History:   Procedure Laterality Date    ADENOIDECTOMY  1/1/1963    AORTIC VALVE REPAIR/REPLACEMENT N/A 07/10/2018    Procedure: INTROP MARIAA; AORTIC VALVE REPLACEMENT; CORONARY ARTERY BYPASS X1 UTILIZING THE ENDOSCOPICALLY HARVESTED LEFT GREATER SAPHENOUS VEIN; PRP;  Surgeon: Hudson Vidales MD;  Location[facility] MAIN OR;  Service: Cardiothoracic    CARDIAC CATHETERIZATION N/A 07/09/2018    Procedure: Right and Left Heart Cath;  Surgeon: Alexandria Ashraf MD;  Location:  [facility];  Service: Cardiovascular    CARDIAC CATHETERIZATION N/A 07/09/2018    Procedure: Coronary angiography;  Surgeon: Alexandria Ashraf MD;  Location:  [facility];  Service: Cardiovascular    CARDIAC VALVE REPLACEMENT  7/10/2018    COLECTOMY PARTIAL / TOTAL      COLON SURGERY  12/25/2011    COLONOSCOPY N/A 02/10/2020    Procedure: COLONOSCOPY;  Surgeon: Kimberli Joshi MD;  Location[facility] END[facility]COPY;  Service: General;  Laterality: N/A;  PRE- SCREENING  POST-- DIVERTICULOSIS    CORONARY ARTERY BYPASS GRAFT      OTHER SURGICAL HISTORY      Tunica Vaginalis Excision of Hydrocele Left    TONSILLECTOMY          Social History     Occupational History    Not on file   Tobacco Use    Smoking status: Never    Smokeless tobacco: Never   Vaping Use    Vaping Use: Never used   Substance and Sexual Activity    Alcohol use: Yes     Alcohol/week: 4.0 standard drinks     Types: 4 Cans of beer per week     Comment: Caffeine Use    Drug use: No    Sexual activity: Yes     Partners: Female     Birth control/protection: Surgical     Comment: with wife only!      Social History     Social History Narrative    Retired from IT with [employer]        Family History   Problem Relation Age of Onset    Rheumatologic disease Mother         Arthritis in both hands    Arthritis Mother     Anemia Father         Hemolytic     "Stroke Paternal Grandfather     Malig Hyperthermia Neg Hx          Review of Systems:   A 14 point review of systems was performed, pertinent positives discussed above, all other systems are negative    Physical Exam: 68 y.o. male  Vital Signs :   Vitals:    08/24/23 1325   BP: 140/72   BP Location: Left arm   Patient Position: Sitting   Cuff Size: Large Adult   Temp: 97.7 °F (36.5 °C)   TempSrc: Temporal   Weight: 115 kg (252 lb 12.8 oz)   Height: 188 cm (74.02\")   PainSc:   1   PainLoc: Knee     General: Alert and Oriented x 3, No acute distress.  Psych: mood and affect appropriate; recent and remote memory intact  Eyes: conjunctivae clear; pupils equally round and reactive, sclerae anicteric  CV: no peripheral edema  Resp: normal respiratory effort  Skin: no rashes or wounds; normal turgor  Musculosketetal; pain and crepitance with knee range of motion  Vascular: palpable distal pulses    Xrays:  -3 views (AP, lateral, and sunrise) were reviewed demonstrating end-stage OA with bone on bone articulation.  -A full length AP xray was ordered and reviewed today for purposes of operative alignment demonstrating end stage arthritic findings. There are no previous full length films for review    Assessment:  End-stage Left knee osteoarthritis. Conservative measures have failed.      Plan:  The plan is to proceed with Left Total Knee Replacement. The patient voiced understanding of the risks, benefits, and alternative forms of treatment that were discussed with Dr Martinez at the time of scheduling.  23-hour home health    Luz Jean, APRN  8/24/2023         "

## 2023-08-24 NOTE — H&P
Patient: Sachin Tolliver    Date of Admission: 8/30/2023    YOB: 1955    Medical Record Number: [id number]    Admitting Physician: Dr. Jesse Martinez    Reason for Admission: End Stage Left Knee OA    History of Present Illness: 68 y.o. male presents with severe end stage knee osteoarthritis which has not been responsive to the full compliment of conservative measures. Despite conservative attempts, there is still severe, constant activity-limiting pain. Given the severity of the pain, the patient has elected to proceed with knee replacement.    Allergies: No Known Allergies      Current Medications:  Home Medications:    Current Outpatient Medications on File Prior to Visit   Medication Sig    aspirin 81 MG tablet Take  by mouth. HOLD PRIOR TO SURGERY PER MD INSTRUCTIONS    atorvastatin (LIPITOR) 40 MG tablet Take 1 tablet by mouth every night.    butalbital-acetaminophen-caffeine (FIORICET, ESGIC) -40 MG per tablet Take 1 tablet by mouth every 6 (Six) hours as needed for headache.    Chlorhexidine Gluconate Cloth 2 % pads Apply  topically.    citalopram (CeleXA) 20 MG tablet Take 1 tablet by mouth Daily.    metoprolol tartrate (LOPRESSOR) 50 MG tablet Take 1 tablet by mouth 2 (Two) Times a Day.     No current facility-administered medications on file prior to visit.     PRN Meds:.    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 Vidales    Tennis elbow     Tricuspid regurgitation     Trace       PF/Surg/Soc Hx:     Past Surgical History:   Procedure Laterality Date    ADENOIDECTOMY  1/1/1963    AORTIC VALVE REPAIR/REPLACEMENT N/A 07/10/2018    Procedure: INTROP MARIAA; AORTIC VALVE REPLACEMENT; CORONARY ARTERY BYPASS X1 UTILIZING THE ENDOSCOPICALLY HARVESTED LEFT GREATER SAPHENOUS VEIN; PRP;  Surgeon: Hudson Vidales MD;  Location: [facility] MAIN OR;  Service: Cardiothoracic    CARDIAC CATHETERIZATION N/A 07/09/2018    Procedure: Right and Left Heart Cath;  Surgeon: Alexandria Ashraf MD;  Location:  [facility];  Service: Cardiovascular    CARDIAC CATHETERIZATION N/A 07/09/2018    Procedure: Coronary angiography;  Surgeon: Alexandria Ashraf MD;  Location:  [facility];  Service: Cardiovascular    CARDIAC VALVE REPLACEMENT  7/10/2018    COLECTOMY PARTIAL / TOTAL      COLON SURGERY  12/25/2011    COLONOSCOPY N/A 02/10/2020    Procedure: COLONOSCOPY;  Surgeon: Kimberli Joshi MD;  Location[facility];  Service: General;  Laterality: N/A;  PRE- SCREENING  POST-- DIVERTICULOSIS    CORONARY ARTERY BYPASS GRAFT      OTHER SURGICAL HISTORY      Tunica Vaginalis Excision of Hydrocele Left    TONSILLECTOMY          Social History     Occupational History    Not on file   Tobacco Use    Smoking status: Never    Smokeless tobacco: Never   Vaping Use    Vaping Use: Never used   Substance and Sexual Activity    Alcohol use: Yes     Alcohol/week: 4.0 standard drinks     Types: 4 Cans of beer per week     Comment: Caffeine Use    Drug use: No    Sexual activity: Yes     Partners: Female     Birth control/protection: Surgical     Comment: with wife only!      Social History     Social History Narrative    Retired from IT with [employer]        Family History   Problem Relation Age of Onset    Rheumatologic disease Mother         Arthritis in both hands    Arthritis Mother     Anemia Father         Hemolytic     "Stroke Paternal Grandfather     Malig Hyperthermia Neg Hx          Review of Systems:   A 14 point review of systems was performed, pertinent positives discussed above, all other systems are negative    Physical Exam: 68 y.o. male  Vital Signs :   Vitals:    08/24/23 1325   BP: 140/72   BP Location: Left arm   Patient Position: Sitting   Cuff Size: Large Adult   Temp: 97.7 øF (36.5 øC)   TempSrc: Temporal   Weight: 115 kg (252 lb 12.8 oz)   Height: 188 cm (74.02\")   PainSc:   1   PainLoc: Knee     General: Alert and Oriented x 3, No acute distress.  Psych: mood and affect appropriate; recent and remote memory intact  Eyes: conjunctivae clear; pupils equally round and reactive, sclerae anicteric  CV: no peripheral edema  Resp: normal respiratory effort  Skin: no rashes or wounds; normal turgor  Musculosketetal; pain and crepitance with knee range of motion  Vascular: palpable distal pulses    Xrays:  -3 views (AP, lateral, and sunrise) were reviewed demonstrating end-stage OA with bone on bone articulation.  -A full length AP xray was ordered and reviewed today for purposes of operative alignment demonstrating end stage arthritic findings. There are no previous full length films for review    Assessment:  End-stage Left knee osteoarthritis. Conservative measures have failed.      Plan:  The plan is to proceed with Left Total Knee Replacement. The patient voiced understanding of the risks, benefits, and alternative forms of treatment that were discussed with Dr Martinez at the time of scheduling.  23-hour home health    Luz Jean, APRN  8/24/2023         "

## 2023-08-30 ENCOUNTER — ANESTHESIA (OUTPATIENT)
Dept: PERIOP | Facility: HOSPITAL | Age: 68
End: 2023-08-30
Payer: MEDICARE

## 2023-08-30 ENCOUNTER — APPOINTMENT (OUTPATIENT)
Dept: GENERAL RADIOLOGY | Facility: HOSPITAL | Age: 68
End: 2023-08-30
Payer: MEDICARE

## 2023-08-30 ENCOUNTER — HOSPITAL ENCOUNTER (OUTPATIENT)
Facility: HOSPITAL | Age: 68
Setting detail: OBSERVATION
Discharge: HOME HEALTH | End: 2023-08-31
Attending: ORTHOPAEDIC SURGERY | Admitting: ORTHOPAEDIC SURGERY
Payer: MEDICARE

## 2023-08-30 ENCOUNTER — ANESTHESIA EVENT (OUTPATIENT)
Dept: PERIOP | Facility: HOSPITAL | Age: 68
End: 2023-08-30
Payer: MEDICARE

## 2023-08-30 ENCOUNTER — HOME HEALTH ADMISSION (OUTPATIENT)
Dept: HOME HEALTH SERVICES | Facility: HOME HEALTHCARE | Age: 68
End: 2023-08-30
Payer: MEDICARE

## 2023-08-30 DIAGNOSIS — Z96.652 S/P TKR (TOTAL KNEE REPLACEMENT), LEFT: Primary | ICD-10-CM

## 2023-08-30 DIAGNOSIS — M17.12 PRIMARY OSTEOARTHRITIS OF LEFT KNEE: ICD-10-CM

## 2023-08-30 PROCEDURE — 25010000002 CEFAZOLIN IN DEXTROSE 2000 MG/ 100 ML SOLUTION: Performed by: NURSE PRACTITIONER

## 2023-08-30 PROCEDURE — 25010000002 ONDANSETRON PER 1 MG: Performed by: NURSE ANESTHETIST, CERTIFIED REGISTERED

## 2023-08-30 PROCEDURE — 25010000002 VANCOMYCIN 10 G RECONSTITUTED SOLUTION: Performed by: NURSE PRACTITIONER

## 2023-08-30 PROCEDURE — 25010000002 FENTANYL CITRATE (PF) 50 MCG/ML SOLUTION: Performed by: ANESTHESIOLOGY

## 2023-08-30 PROCEDURE — 25010000002 PHENYLEPHRINE 10 MG/ML SOLUTION: Performed by: NURSE ANESTHETIST, CERTIFIED REGISTERED

## 2023-08-30 PROCEDURE — 0 BUPIVACAINE LIPOSOME 1.3 % SUSPENSION 20 ML VIAL: Performed by: ORTHOPAEDIC SURGERY

## 2023-08-30 PROCEDURE — 25010000002 PROPOFOL 10 MG/ML EMULSION: Performed by: NURSE ANESTHETIST, CERTIFIED REGISTERED

## 2023-08-30 PROCEDURE — 25010000002 SUGAMMADEX 200 MG/2ML SOLUTION: Performed by: NURSE ANESTHETIST, CERTIFIED REGISTERED

## 2023-08-30 PROCEDURE — G0378 HOSPITAL OBSERVATION PER HR: HCPCS

## 2023-08-30 PROCEDURE — 97161 PT EVAL LOW COMPLEX 20 MIN: CPT

## 2023-08-30 PROCEDURE — 73560 X-RAY EXAM OF KNEE 1 OR 2: CPT

## 2023-08-30 PROCEDURE — 25010000002 DEXAMETHASONE SODIUM PHOSPHATE 20 MG/5ML SOLUTION: Performed by: NURSE ANESTHETIST, CERTIFIED REGISTERED

## 2023-08-30 PROCEDURE — 25010000002 ROPIVACAINE PER 1 MG: Performed by: ANESTHESIOLOGY

## 2023-08-30 PROCEDURE — 25010000002 ACETAMINOPHEN 10 MG/ML SOLUTION: Performed by: NURSE ANESTHETIST, CERTIFIED REGISTERED

## 2023-08-30 PROCEDURE — 25010000002 MIDAZOLAM PER 1 MG: Performed by: ANESTHESIOLOGY

## 2023-08-30 PROCEDURE — C1713 ANCHOR/SCREW BN/BN,TIS/BN: HCPCS | Performed by: ORTHOPAEDIC SURGERY

## 2023-08-30 PROCEDURE — C9290 INJ, BUPIVACAINE LIPOSOME: HCPCS | Performed by: ORTHOPAEDIC SURGERY

## 2023-08-30 PROCEDURE — 25010000002 CEFAZOLIN IN DEXTROSE 2-4 GM/100ML-% SOLUTION: Performed by: NURSE PRACTITIONER

## 2023-08-30 PROCEDURE — 27447 TOTAL KNEE ARTHROPLASTY: CPT | Performed by: ORTHOPAEDIC SURGERY

## 2023-08-30 PROCEDURE — 97110 THERAPEUTIC EXERCISES: CPT

## 2023-08-30 PROCEDURE — C1776 JOINT DEVICE (IMPLANTABLE): HCPCS | Performed by: ORTHOPAEDIC SURGERY

## 2023-08-30 DEVICE — VIOLET ANTIBACTERIAL POLYDIOXANONE, KNOTLESS TISSUE CONTROL DEVICE
Type: IMPLANTABLE DEVICE | Site: KNEE | Status: FUNCTIONAL
Brand: STRATAFIX

## 2023-08-30 DEVICE — IMPLANTABLE DEVICE: Type: IMPLANTABLE DEVICE | Status: FUNCTIONAL

## 2023-08-30 DEVICE — KNOTLESS TISSUE CONTROL DEVICE, UNDYED UNIDIRECTIONAL (ANTIBACTERIAL) SYNTHETIC ABSORBABLE DEVICE
Type: IMPLANTABLE DEVICE | Site: KNEE | Status: FUNCTIONAL
Brand: STRATAFIX

## 2023-08-30 DEVICE — GENESIS II BICONVEX PATELLA 29MM
Type: IMPLANTABLE DEVICE | Site: KNEE | Status: FUNCTIONAL
Brand: GENESIS II

## 2023-08-30 DEVICE — LEGION CR HIGH FLEX XLPE SZ 7-8 9MM
Type: IMPLANTABLE DEVICE | Site: KNEE | Status: FUNCTIONAL
Brand: LEGION

## 2023-08-30 DEVICE — LEGION CRUCIATE RETAINING OXINIUM                                    FEMORAL SIZE 8LEFT
Type: IMPLANTABLE DEVICE | Site: KNEE | Status: FUNCTIONAL
Brand: LEGION

## 2023-08-30 DEVICE — PALACOS® R IS A FAST-CURING, RADIOPAQUE, POLY(METHYL METHACRYLATE)-BASED BONE CEMENT.PALACOS ® R CONTAINS THE X-RAY CONTRAST MEDIUM ZIRCONIUM DIOXIDE. TO IMPROVE VISIBILITY IN THE SURGICAL FIELD PALACOS ® R HAS BEEN COLOURED WITH CHLOROPHYLL (E141). THE BONE CEMENT IS PREPARED DIRECTLY BEFORE USE BY MIXING A POLYMER POWDER COMPONENT WITH A LIQUID MONOMER COMPONENT. A DUCTILE DOUGH FORMS WHICH CURES WITHIN A FEW MINUTES.
Type: IMPLANTABLE DEVICE | Site: KNEE | Status: FUNCTIONAL
Brand: PALACOS®

## 2023-08-30 DEVICE — GENESIS II NON-POROUS TIBIAL                                    BASEPLATE SIZE 7 LEFT
Type: IMPLANTABLE DEVICE | Site: KNEE | Status: FUNCTIONAL
Brand: GENESIS II

## 2023-08-30 RX ORDER — FENTANYL CITRATE 50 UG/ML
100 INJECTION, SOLUTION INTRAMUSCULAR; INTRAVENOUS
Status: DISCONTINUED | OUTPATIENT
Start: 2023-08-30 | End: 2023-08-30 | Stop reason: HOSPADM

## 2023-08-30 RX ORDER — ATORVASTATIN CALCIUM 20 MG/1
40 TABLET, FILM COATED ORAL NIGHTLY
Status: DISCONTINUED | OUTPATIENT
Start: 2023-08-30 | End: 2023-08-31 | Stop reason: HOSPADM

## 2023-08-30 RX ORDER — SODIUM CHLORIDE, SODIUM LACTATE, POTASSIUM CHLORIDE, CALCIUM CHLORIDE 600; 310; 30; 20 MG/100ML; MG/100ML; MG/100ML; MG/100ML
9 INJECTION, SOLUTION INTRAVENOUS CONTINUOUS
Status: DISCONTINUED | OUTPATIENT
Start: 2023-08-30 | End: 2023-08-30

## 2023-08-30 RX ORDER — ROPIVACAINE HYDROCHLORIDE 2 MG/ML
INJECTION, SOLUTION EPIDURAL; INFILTRATION; PERINEURAL
Status: COMPLETED | OUTPATIENT
Start: 2023-08-30 | End: 2023-08-30

## 2023-08-30 RX ORDER — ONDANSETRON 4 MG/1
4 TABLET, FILM COATED ORAL EVERY 8 HOURS PRN
Qty: 10 TABLET | Refills: 0 | Status: SHIPPED | OUTPATIENT
Start: 2023-08-30

## 2023-08-30 RX ORDER — HYDROCODONE BITARTRATE AND ACETAMINOPHEN 7.5; 325 MG/1; MG/1
1 TABLET ORAL EVERY 4 HOURS PRN
Status: DISCONTINUED | OUTPATIENT
Start: 2023-08-30 | End: 2023-08-31 | Stop reason: HOSPADM

## 2023-08-30 RX ORDER — LIDOCAINE HYDROCHLORIDE 20 MG/ML
INJECTION, SOLUTION EPIDURAL; INFILTRATION; INTRACAUDAL; PERINEURAL AS NEEDED
Status: DISCONTINUED | OUTPATIENT
Start: 2023-08-30 | End: 2023-08-30 | Stop reason: SURG

## 2023-08-30 RX ORDER — DEXAMETHASONE SODIUM PHOSPHATE 4 MG/ML
INJECTION, SOLUTION INTRA-ARTICULAR; INTRALESIONAL; INTRAMUSCULAR; INTRAVENOUS; SOFT TISSUE AS NEEDED
Status: DISCONTINUED | OUTPATIENT
Start: 2023-08-30 | End: 2023-08-30 | Stop reason: SURG

## 2023-08-30 RX ORDER — DIPHENHYDRAMINE HYDROCHLORIDE 50 MG/ML
12.5 INJECTION INTRAMUSCULAR; INTRAVENOUS
Status: DISCONTINUED | OUTPATIENT
Start: 2023-08-30 | End: 2023-08-30 | Stop reason: HOSPADM

## 2023-08-30 RX ORDER — ONDANSETRON 2 MG/ML
4 INJECTION INTRAMUSCULAR; INTRAVENOUS ONCE AS NEEDED
Status: DISCONTINUED | OUTPATIENT
Start: 2023-08-30 | End: 2023-08-30 | Stop reason: HOSPADM

## 2023-08-30 RX ORDER — HYDROCODONE BITARTRATE AND ACETAMINOPHEN 7.5; 325 MG/1; MG/1
1 TABLET ORAL EVERY 4 HOURS PRN
Status: DISCONTINUED | OUTPATIENT
Start: 2023-08-30 | End: 2023-08-30 | Stop reason: HOSPADM

## 2023-08-30 RX ORDER — PROMETHAZINE HYDROCHLORIDE 25 MG/1
25 TABLET ORAL ONCE AS NEEDED
Status: DISCONTINUED | OUTPATIENT
Start: 2023-08-30 | End: 2023-08-30 | Stop reason: HOSPADM

## 2023-08-30 RX ORDER — ONDANSETRON 4 MG/1
4 TABLET, FILM COATED ORAL EVERY 6 HOURS PRN
Status: DISCONTINUED | OUTPATIENT
Start: 2023-08-30 | End: 2023-08-31 | Stop reason: HOSPADM

## 2023-08-30 RX ORDER — FENTANYL CITRATE 50 UG/ML
25 INJECTION, SOLUTION INTRAMUSCULAR; INTRAVENOUS
Status: DISCONTINUED | OUTPATIENT
Start: 2023-08-30 | End: 2023-08-30 | Stop reason: HOSPADM

## 2023-08-30 RX ORDER — CHLORHEXIDINE GLUCONATE 500 MG/1
CLOTH TOPICAL 2 TIMES DAILY
Status: DISCONTINUED | OUTPATIENT
Start: 2023-08-30 | End: 2023-08-30

## 2023-08-30 RX ORDER — ROCURONIUM BROMIDE 10 MG/ML
INJECTION, SOLUTION INTRAVENOUS AS NEEDED
Status: DISCONTINUED | OUTPATIENT
Start: 2023-08-30 | End: 2023-08-30 | Stop reason: SURG

## 2023-08-30 RX ORDER — ASPIRIN 81 MG/1
81 TABLET ORAL EVERY 12 HOURS SCHEDULED
Status: DISCONTINUED | OUTPATIENT
Start: 2023-08-31 | End: 2023-08-31 | Stop reason: HOSPADM

## 2023-08-30 RX ORDER — PREGABALIN 75 MG/1
150 CAPSULE ORAL ONCE
Status: COMPLETED | OUTPATIENT
Start: 2023-08-30 | End: 2023-08-30

## 2023-08-30 RX ORDER — NALOXONE HCL 0.4 MG/ML
0.2 VIAL (ML) INJECTION AS NEEDED
Status: DISCONTINUED | OUTPATIENT
Start: 2023-08-30 | End: 2023-08-30 | Stop reason: HOSPADM

## 2023-08-30 RX ORDER — ACETAMINOPHEN 325 MG/1
650 TABLET ORAL EVERY 6 HOURS PRN
Status: DISCONTINUED | OUTPATIENT
Start: 2023-08-30 | End: 2023-08-31 | Stop reason: HOSPADM

## 2023-08-30 RX ORDER — IPRATROPIUM BROMIDE AND ALBUTEROL SULFATE 2.5; .5 MG/3ML; MG/3ML
3 SOLUTION RESPIRATORY (INHALATION) ONCE AS NEEDED
Status: DISCONTINUED | OUTPATIENT
Start: 2023-08-30 | End: 2023-08-30 | Stop reason: HOSPADM

## 2023-08-30 RX ORDER — ACETAMINOPHEN 10 MG/ML
INJECTION, SOLUTION INTRAVENOUS AS NEEDED
Status: DISCONTINUED | OUTPATIENT
Start: 2023-08-30 | End: 2023-08-30 | Stop reason: SURG

## 2023-08-30 RX ORDER — HYDROCODONE BITARTRATE AND ACETAMINOPHEN 7.5; 325 MG/1; MG/1
2 TABLET ORAL EVERY 4 HOURS PRN
Status: DISCONTINUED | OUTPATIENT
Start: 2023-08-30 | End: 2023-08-31 | Stop reason: HOSPADM

## 2023-08-30 RX ORDER — ONDANSETRON 2 MG/ML
4 INJECTION INTRAMUSCULAR; INTRAVENOUS ONCE AS NEEDED
Status: DISCONTINUED | OUTPATIENT
Start: 2023-08-30 | End: 2023-08-31 | Stop reason: HOSPADM

## 2023-08-30 RX ORDER — PROMETHAZINE HYDROCHLORIDE 12.5 MG/1
12.5 TABLET ORAL EVERY 4 HOURS PRN
Status: DISCONTINUED | OUTPATIENT
Start: 2023-08-30 | End: 2023-08-31 | Stop reason: HOSPADM

## 2023-08-30 RX ORDER — CEFAZOLIN SODIUM 2 G/100ML
2 INJECTION, SOLUTION INTRAVENOUS ONCE
Status: COMPLETED | OUTPATIENT
Start: 2023-08-30 | End: 2023-08-30

## 2023-08-30 RX ORDER — DROPERIDOL 2.5 MG/ML
0.62 INJECTION, SOLUTION INTRAMUSCULAR; INTRAVENOUS
Status: DISCONTINUED | OUTPATIENT
Start: 2023-08-30 | End: 2023-08-30 | Stop reason: HOSPADM

## 2023-08-30 RX ORDER — ASPIRIN 81 MG/1
TABLET ORAL
Qty: 60 TABLET | Refills: 0 | Status: SHIPPED | OUTPATIENT
Start: 2023-08-30

## 2023-08-30 RX ORDER — LABETALOL HYDROCHLORIDE 5 MG/ML
5 INJECTION, SOLUTION INTRAVENOUS
Status: DISCONTINUED | OUTPATIENT
Start: 2023-08-30 | End: 2023-08-30 | Stop reason: HOSPADM

## 2023-08-30 RX ORDER — PANTOPRAZOLE SODIUM 40 MG/1
40 TABLET, DELAYED RELEASE ORAL DAILY
Qty: 14 TABLET | Refills: 0 | Status: SHIPPED | OUTPATIENT
Start: 2023-08-30 | End: 2023-09-14

## 2023-08-30 RX ORDER — HYDRALAZINE HYDROCHLORIDE 20 MG/ML
5 INJECTION INTRAMUSCULAR; INTRAVENOUS
Status: DISCONTINUED | OUTPATIENT
Start: 2023-08-30 | End: 2023-08-30 | Stop reason: HOSPADM

## 2023-08-30 RX ORDER — FENTANYL CITRATE 50 UG/ML
50 INJECTION, SOLUTION INTRAMUSCULAR; INTRAVENOUS
Status: DISCONTINUED | OUTPATIENT
Start: 2023-08-30 | End: 2023-08-30 | Stop reason: HOSPADM

## 2023-08-30 RX ORDER — UREA 10 %
1 LOTION (ML) TOPICAL NIGHTLY PRN
Status: DISCONTINUED | OUTPATIENT
Start: 2023-08-30 | End: 2023-08-31 | Stop reason: HOSPADM

## 2023-08-30 RX ORDER — EPHEDRINE SULFATE 50 MG/ML
INJECTION INTRAVENOUS AS NEEDED
Status: DISCONTINUED | OUTPATIENT
Start: 2023-08-30 | End: 2023-08-30 | Stop reason: SURG

## 2023-08-30 RX ORDER — HYDROCODONE BITARTRATE AND ACETAMINOPHEN 7.5; 325 MG/1; MG/1
1 TABLET ORAL EVERY 4 HOURS PRN
Qty: 56 TABLET | Refills: 0 | Status: SHIPPED | OUTPATIENT
Start: 2023-08-30

## 2023-08-30 RX ORDER — MIDAZOLAM HYDROCHLORIDE 1 MG/ML
2 INJECTION INTRAMUSCULAR; INTRAVENOUS
Status: DISCONTINUED | OUTPATIENT
Start: 2023-08-30 | End: 2023-08-30 | Stop reason: HOSPADM

## 2023-08-30 RX ORDER — HYDROMORPHONE HYDROCHLORIDE 1 MG/ML
0.25 INJECTION, SOLUTION INTRAMUSCULAR; INTRAVENOUS; SUBCUTANEOUS
Status: DISCONTINUED | OUTPATIENT
Start: 2023-08-30 | End: 2023-08-30 | Stop reason: HOSPADM

## 2023-08-30 RX ORDER — PROPOFOL 10 MG/ML
VIAL (ML) INTRAVENOUS AS NEEDED
Status: DISCONTINUED | OUTPATIENT
Start: 2023-08-30 | End: 2023-08-30 | Stop reason: SURG

## 2023-08-30 RX ORDER — SODIUM CHLORIDE 0.9 % (FLUSH) 0.9 %
3-10 SYRINGE (ML) INJECTION AS NEEDED
Status: DISCONTINUED | OUTPATIENT
Start: 2023-08-30 | End: 2023-08-30 | Stop reason: HOSPADM

## 2023-08-30 RX ORDER — EPHEDRINE SULFATE 50 MG/ML
5 INJECTION, SOLUTION INTRAVENOUS ONCE AS NEEDED
Status: DISCONTINUED | OUTPATIENT
Start: 2023-08-30 | End: 2023-08-30 | Stop reason: HOSPADM

## 2023-08-30 RX ORDER — TRANEXAMIC ACID 100 MG/ML
INJECTION, SOLUTION INTRAVENOUS AS NEEDED
Status: DISCONTINUED | OUTPATIENT
Start: 2023-08-30 | End: 2023-08-30 | Stop reason: SURG

## 2023-08-30 RX ORDER — SODIUM CHLORIDE 0.9 % (FLUSH) 0.9 %
3 SYRINGE (ML) INJECTION EVERY 12 HOURS SCHEDULED
Status: DISCONTINUED | OUTPATIENT
Start: 2023-08-30 | End: 2023-08-30 | Stop reason: HOSPADM

## 2023-08-30 RX ORDER — HYDROCODONE BITARTRATE AND ACETAMINOPHEN 5; 325 MG/1; MG/1
1 TABLET ORAL ONCE AS NEEDED
Status: COMPLETED | OUTPATIENT
Start: 2023-08-30 | End: 2023-08-30

## 2023-08-30 RX ORDER — PROMETHAZINE HYDROCHLORIDE 25 MG/1
25 SUPPOSITORY RECTAL ONCE AS NEEDED
Status: DISCONTINUED | OUTPATIENT
Start: 2023-08-30 | End: 2023-08-30 | Stop reason: HOSPADM

## 2023-08-30 RX ORDER — POLYETHYLENE GLYCOL 3350 17 G/17G
17 POWDER, FOR SOLUTION ORAL 2 TIMES DAILY
Qty: 238 G | Refills: 0 | Status: SHIPPED | OUTPATIENT
Start: 2023-08-30 | End: 2023-09-07

## 2023-08-30 RX ORDER — CEFAZOLIN SODIUM 2 G/100ML
2000 INJECTION, SOLUTION INTRAVENOUS EVERY 8 HOURS
Status: COMPLETED | OUTPATIENT
Start: 2023-08-30 | End: 2023-08-31

## 2023-08-30 RX ORDER — MAGNESIUM HYDROXIDE 1200 MG/15ML
LIQUID ORAL AS NEEDED
Status: DISCONTINUED | OUTPATIENT
Start: 2023-08-30 | End: 2023-08-30 | Stop reason: HOSPADM

## 2023-08-30 RX ORDER — CITALOPRAM 20 MG/1
20 TABLET ORAL DAILY
Status: DISCONTINUED | OUTPATIENT
Start: 2023-08-30 | End: 2023-08-31 | Stop reason: HOSPADM

## 2023-08-30 RX ORDER — MELOXICAM 15 MG/1
15 TABLET ORAL DAILY
Qty: 14 TABLET | Refills: 0 | Status: SHIPPED | OUTPATIENT
Start: 2023-08-30

## 2023-08-30 RX ORDER — ONDANSETRON 2 MG/ML
INJECTION INTRAMUSCULAR; INTRAVENOUS AS NEEDED
Status: DISCONTINUED | OUTPATIENT
Start: 2023-08-30 | End: 2023-08-30 | Stop reason: SURG

## 2023-08-30 RX ORDER — FLUMAZENIL 0.1 MG/ML
0.2 INJECTION INTRAVENOUS AS NEEDED
Status: DISCONTINUED | OUTPATIENT
Start: 2023-08-30 | End: 2023-08-30 | Stop reason: HOSPADM

## 2023-08-30 RX ORDER — METOPROLOL TARTRATE 50 MG/1
50 TABLET, FILM COATED ORAL 2 TIMES DAILY
Status: DISCONTINUED | OUTPATIENT
Start: 2023-08-30 | End: 2023-08-31 | Stop reason: HOSPADM

## 2023-08-30 RX ORDER — PHENYLEPHRINE HYDROCHLORIDE 10 MG/ML
INJECTION INTRAVENOUS AS NEEDED
Status: DISCONTINUED | OUTPATIENT
Start: 2023-08-30 | End: 2023-08-30 | Stop reason: SURG

## 2023-08-30 RX ADMIN — FENTANYL CITRATE 50 MCG: 50 INJECTION, SOLUTION INTRAMUSCULAR; INTRAVENOUS at 08:27

## 2023-08-30 RX ADMIN — ATORVASTATIN CALCIUM 40 MG: 20 TABLET, FILM COATED ORAL at 19:39

## 2023-08-30 RX ADMIN — SUGAMMADEX 200 MG: 100 INJECTION, SOLUTION INTRAVENOUS at 11:06

## 2023-08-30 RX ADMIN — ROCURONIUM BROMIDE 50 MG: 10 INJECTION, SOLUTION INTRAVENOUS at 09:49

## 2023-08-30 RX ADMIN — ONDANSETRON 4 MG: 2 INJECTION INTRAMUSCULAR; INTRAVENOUS at 10:52

## 2023-08-30 RX ADMIN — ACETAMINOPHEN 1000 MG: 10 INJECTION, SOLUTION INTRAVENOUS at 09:57

## 2023-08-30 RX ADMIN — METOPROLOL TARTRATE 50 MG: 50 TABLET, FILM COATED ORAL at 19:39

## 2023-08-30 RX ADMIN — TRANEXAMIC ACID 1000 MG: 100 INJECTION INTRAVENOUS at 10:39

## 2023-08-30 RX ADMIN — SODIUM CHLORIDE, POTASSIUM CHLORIDE, SODIUM LACTATE AND CALCIUM CHLORIDE 9 ML/HR: 600; 310; 30; 20 INJECTION, SOLUTION INTRAVENOUS at 08:23

## 2023-08-30 RX ADMIN — SODIUM CHLORIDE, POTASSIUM CHLORIDE, SODIUM LACTATE AND CALCIUM CHLORIDE 500 ML: 600; 310; 30; 20 INJECTION, SOLUTION INTRAVENOUS at 08:29

## 2023-08-30 RX ADMIN — ROPIVACAINE HYDROCHLORIDE 15 ML: 2 INJECTION, SOLUTION EPIDURAL; INFILTRATION at 08:33

## 2023-08-30 RX ADMIN — CITALOPRAM 20 MG: 20 TABLET, FILM COATED ORAL at 16:52

## 2023-08-30 RX ADMIN — Medication 1750 MG: at 08:33

## 2023-08-30 RX ADMIN — HYDROCODONE BITARTRATE AND ACETAMINOPHEN 1 TABLET: 5; 325 TABLET ORAL at 12:03

## 2023-08-30 RX ADMIN — PROPOFOL 175 MCG/KG/MIN: 10 INJECTION, EMULSION INTRAVENOUS at 09:50

## 2023-08-30 RX ADMIN — EPHEDRINE SULFATE 5 MG: 50 INJECTION INTRAVENOUS at 10:29

## 2023-08-30 RX ADMIN — CHLORHEXIDINE GLUCONATE: 500 CLOTH TOPICAL at 08:42

## 2023-08-30 RX ADMIN — PHENYLEPHRINE HYDROCHLORIDE 100 MCG: 10 INJECTION INTRAVENOUS at 10:46

## 2023-08-30 RX ADMIN — PHENYLEPHRINE HYDROCHLORIDE 100 MCG: 10 INJECTION INTRAVENOUS at 10:38

## 2023-08-30 RX ADMIN — SODIUM CHLORIDE, POTASSIUM CHLORIDE, SODIUM LACTATE AND CALCIUM CHLORIDE: 600; 310; 30; 20 INJECTION, SOLUTION INTRAVENOUS at 11:32

## 2023-08-30 RX ADMIN — MIDAZOLAM 2 MG: 1 INJECTION INTRAMUSCULAR; INTRAVENOUS at 08:27

## 2023-08-30 RX ADMIN — PREGABALIN 150 MG: 75 CAPSULE ORAL at 07:54

## 2023-08-30 RX ADMIN — EPHEDRINE SULFATE 5 MG: 50 INJECTION INTRAVENOUS at 10:38

## 2023-08-30 RX ADMIN — EPHEDRINE SULFATE 10 MG: 50 INJECTION INTRAVENOUS at 10:46

## 2023-08-30 RX ADMIN — PHENYLEPHRINE HYDROCHLORIDE 100 MCG: 10 INJECTION INTRAVENOUS at 09:59

## 2023-08-30 RX ADMIN — PHENYLEPHRINE HYDROCHLORIDE 200 MCG: 10 INJECTION INTRAVENOUS at 10:53

## 2023-08-30 RX ADMIN — LIDOCAINE HYDROCHLORIDE 80 MG: 20 INJECTION, SOLUTION EPIDURAL; INFILTRATION; INTRACAUDAL; PERINEURAL at 09:48

## 2023-08-30 RX ADMIN — PHENYLEPHRINE HYDROCHLORIDE 50 MCG: 10 INJECTION INTRAVENOUS at 10:29

## 2023-08-30 RX ADMIN — EPHEDRINE SULFATE 10 MG: 50 INJECTION INTRAVENOUS at 10:53

## 2023-08-30 RX ADMIN — CEFAZOLIN SODIUM 2 G: 2 INJECTION, SOLUTION INTRAVENOUS at 09:34

## 2023-08-30 RX ADMIN — PROPOFOL 150 MG: 10 INJECTION, EMULSION INTRAVENOUS at 09:48

## 2023-08-30 RX ADMIN — DEXAMETHASONE SODIUM PHOSPHATE 8 MG: 4 INJECTION, SOLUTION INTRAMUSCULAR; INTRAVENOUS at 09:54

## 2023-08-30 RX ADMIN — CEFAZOLIN SODIUM 2000 MG: 2 INJECTION, SOLUTION INTRAVENOUS at 16:52

## 2023-08-30 NOTE — THERAPY EVALUATION
Patient Name: Sachin Tolliver  : 1955    MRN: [id number]                              Today's Date: 2023       Admit Date: 2023    Visit Dx:     ICD-10-CM ICD-9-CM   1. S/P TKR (total knee replacement), left  Z96.652 V43.65   2. Primary osteoarthritis of left knee  M17.12 715.16     Patient Active Problem List   Diagnosis    Benign essential hypertension    Hereditary hemolytic anemia    Hyperlipidemia    Migraine    Situational depression    Aortic valve calcification    Obesity (BMI 30.0-34.9)    S/P aortic valve replacement with tissue    S/P CABG x 1    Primary osteoarthritis of left knee     Past Medical History:   Diagnosis Date    Aortic stenosis due to bicuspid aortic valve 2018    Aortic valve calcification     Benign essential hypertension     Broken nose     CAD (coronary artery disease)     Hx CABG    Dressler syndrome 2018    Fracture, radius     Heart murmur     Hemolytic anemia     Hyperlipidemia     Controlled w/Meds    Hypertension     Controlled w/Meds    Left knee pain     Migraine     Mild mitral regurgitation     Nonrheumatic aortic (valve) stenosis     Pulmonic regurgitation     Trace    Reactive depression (situational)     Severe aortic valve stenosis     S/p AVR on 07/10/18 by Dr. Vidales    Tennis elbow     Tricuspid regurgitation     Trace     Past Surgical History:   Procedure Laterality Date    ADENOIDECTOMY  1963    AORTIC VALVE REPAIR/REPLACEMENT N/A 07/10/2018    Procedure: INTROP MARIAA; AORTIC VALVE REPLACEMENT; CORONARY ARTERY BYPASS X1 UTILIZING THE ENDOSCOPICALLY HARVESTED LEFT GREATER SAPHENOUS VEIN; PRP;  Surgeon: Hudson Vidales MD;  Location: [facility] OR;  Service: Cardiothoracic    CARDIAC CATHETERIZATION N/A 2018    Procedure: Right and Left Heart Cath;  Surgeon: Alexandria Ashraf MD;  Location: [facility] INVASIVE LOCATION;  Service: Cardiovascular    CARDIAC CATHETERIZATION N/A 2018    Procedure: Coronary angiography;   Surgeon: Alexandria sAhraf MD;  Location: [facility] CATH INVASIVE LOCATION;  Service: Cardiovascular    CARDIAC VALVE REPLACEMENT  7/10/2018    COLECTOMY PARTIAL / TOTAL      COLON SURGERY  12/25/2011    COLONOSCOPY N/A 02/10/2020    Procedure: COLONOSCOPY;  Surgeon: Kimberli Joshi MD;  Location: [facility] ENDOSCOPY;  Service: General;  Laterality: N/A;  PRE- SCREENING  POST-- DIVERTICULOSIS    CORONARY ARTERY BYPASS GRAFT      OTHER SURGICAL HISTORY      [place] Vaginalis Excision of Hydrocele Left    TONSILLECTOMY        General Information       Row Name 08/30/23 1432          Physical Therapy Time and Intention    Document Type evaluation  Pt. is s/p Left TKR  -MS     Mode of Treatment physical therapy;individual therapy  -MS       Row Name 08/30/23 1432          General Information    Patient Profile Reviewed yes  -MS     Prior Level of Function independent:  -MS     Existing Precautions/Restrictions fall  Exit alarm  -MS     Barriers to Rehab none identified  -MS       Row Name 08/30/23 1432          Cognition    Orientation Status (Cognition) oriented x 3  -MS       Row Name 08/30/23 1432          Safety Issues, Functional Mobility    Comment, Safety Issues/Impairments (Mobility) Gait belt used for safety.  -MS               User Key  (r) = Recorded By, (t) = Taken By, (c) = Cosigned By      Initials Name Provider Type    MS Jasbir Simmons, PT Physical Therapist                   Mobility       Row Name 08/30/23 1433          Bed Mobility    Bed Mobility supine-sit;sit-supine  -MS     Supine-Sit [place] (Bed Mobility) independent  -MS       Row Name 08/30/23 1433          Sit-Stand Transfer    Sit-Stand [place] (Transfers) standby assist  -MS     Assistive Device (Sit-Stand Transfers) walker, front-wheeled  -MS       Row Name 08/30/23 1433          Gait/Stairs (Locomotion)    [place] Level (Gait) contact guard  -MS     Assistive Device (Gait) walker, front-wheeled  -MS     Distance in Feet (Gait) 40  feet  -MS     Deviations/Abnormal Patterns (Gait) colleen decreased  -MS       Row Name 08/30/23 1433          Mobility    Extremity Weight-bearing Status left lower extremity  -MS     Left Lower Extremity (Weight-bearing Status) weight-bearing as tolerated (WBAT)  -MS               User Key  (r) = Recorded By, (t) = Taken By, (c) = Cosigned By      Initials Name Provider Type    MS RodríguezSimmonsJasbir, PT Physical Therapist                   Obj/Interventions       Row Name 08/30/23 1433          Range of Motion Comprehensive    Comment, General Range of Motion BUE/RLE (WFL's)  -MS       Row Name 08/30/23 1433          Strength Comprehensive (MMT)    Comment, General Manual Muscle Testing (MMT) Assessment BUE/RLE (>/= 3/5)  -MS       Row Name 08/30/23 1433          Motor Skills    Therapeutic Exercise --  Left TKR ther. ex. program x 10 reps completed.  -MS               User Key  (r) = Recorded By, (t) = Taken By, (c) = Cosigned By      Initials Name Provider Type    MS IrisJasbir, PT Physical Therapist                   Goals/Plan       Row Name 08/30/23 1434          Transfer Goal 1 (PT)    Activity/Assistive Device (Transfer Goal 1, PT) transfers, all;walker, rolling  -MS     [place] Level/Cues Needed (Transfer Goal 1, PT) independent  -MS     Time Frame (Transfer Goal 1, PT) long term goal (LTG);3 days  -MS       Row Name 08/30/23 1434          Gait Training Goal 1 (PT)    Activity/Assistive Device (Gait Training Goal 1, PT) gait (walking locomotion);walker, rolling  -MS     [place] Level (Gait Training Goal 1, PT) standby assist  -MS     Distance (Gait Training Goal 1, PT) 100 feet  -MS     Time Frame (Gait Training Goal 1, PT) long term goal (LTG);3 days  -MS       Row Name 08/30/23 1434          Stairs Goal 1 (PT)    Activity/Assistive Device (Stairs Goal 1, PT) stairs, all skills  -MS     [place] Level/Cues Needed (Stairs Goal 1, PT) standby assist;contact guard required  -MS     Number  of Stairs (Stairs Goal 1, PT) 3  -MS     Time Frame (Stairs Goal 1, PT) long term goal (LTG);3 days  -MS       Row Name 08/30/23 1434          Therapy Assessment/Plan (PT)    Planned Therapy Interventions (PT) balance training;bed mobility training;gait training;home exercise program;postural re-education;patient/family education;ROM (range of motion);stair training;strengthening;transfer training  -MS               User Key  (r) = Recorded By, (t) = Taken By, (c) = Cosigned By      Initials Name Provider Type    Jasbir Mayorga, PT Physical Therapist                   Clinical Impression       Row Name 08/30/23 1434          Pain    Pretreatment Pain Rating 3/10  -MS     Posttreatment Pain Rating 3/10  -MS     Pain Location - Side/Orientation Left  -MS     Pain Location - knee  -MS       Row Name 08/30/23 1434          Plan of Care Review    Plan of Care Reviewed With patient  -MS       Row Name 08/30/23 1434          Therapy Assessment/Plan (PT)    Rehab Potential (PT) good, to achieve stated therapy goals  -MS     Criteria for Skilled Interventions Met (PT) skilled treatment is necessary  -MS     Therapy Frequency (PT) daily  -MS       Row Name 08/30/23 1434          Positioning and Restraints    Pre-Treatment Position in bed  -MS     Post Treatment Position chair  -MS     In Chair notified nsg;reclined;sitting;call light within reach;encouraged to call for assist;exit alarm on  -MS               User Key  (r) = Recorded By, (t) = Taken By, (c) = Cosigned By      Initials Name Provider Type    Jasbir Mayorga, PT Physical Therapist                   Outcome Measures       Row Name 08/30/23 1435          How much help from another person do you currently need...    Turning from your back to your side while in flat bed without using bedrails? 4  -MS     Moving from lying on back to sitting on the side of a flat bed without bedrails? 4  -MS     Moving to and from a bed to a chair (including a wheelchair)?  3  -MS     Standing up from a chair using your arms (e.g., wheelchair, bedside chair)? 3  -MS     Climbing 3-5 steps with a railing? 3  -MS     To walk in hospital room? 3  -MS     AM-PAC 6 Clicks Score (PT) 20  -MS     Highest level of mobility 6 --> Walked 10 steps or more  -MS       Row Name 08/30/23 1435          Functional Assessment    Outcome Measure Options AM-PAC 6 Clicks Basic Mobility (PT)  -MS               User Key  (r) = Recorded By, (t) = Taken By, (c) = Cosigned By      Initials Name Provider Type    MS Jasbir Simmons, PT Physical Therapist                                 Physical Therapy Education       Title: PT OT SLP Therapies (Done)       Topic: Physical Therapy (Done)       Point: Mobility training (Done)       Learning Progress Summary             Patient Acceptance, E,D, VU,NR by MS at 8/30/2023 1435                         Point: Home exercise program (Done)       Learning Progress Summary             Patient Acceptance, E,D, VU,NR by MS at 8/30/2023 1435                         Point: Body mechanics (Done)       Learning Progress Summary             Patient Acceptance, E,D, VU,NR by MS at 8/30/2023 1435                         Point: Precautions (Done)       Learning Progress Summary             Patient Acceptance, E,D, VU,NR by MS at 8/30/2023 1435                                         User Key       Initials Effective Dates Name Provider Type Discipline    MS 06/16/21 -  Jasbir Simmons PT Physical Therapist PT                  PT Recommendation and Plan  Planned Therapy Interventions (PT): balance training, bed mobility training, gait training, home exercise program, postural re-education, patient/family education, ROM (range of motion), stair training, strengthening, transfer training  Plan of Care Reviewed With: patient  Outcome Evaluation: Pt. presents with typical post op impairments related to TKR surgery that include decreased ROM, decreased strength, and decreased balance.   Pt. will benefit from skilled inpt. P.T. to address his functional deficits and to assist pt. in regaining his maximum level of independence with functional mobility.     Time Calculation:         PT Charges       Row Name 08/30/23 1436             Time Calculation    Start Time 1340  -MS      Stop Time 1400  -MS      Time Calculation (min) 20 min  -MS      PT Received On 08/30/23  -MS      PT - Next Appointment 08/31/23  -MS      PT Goal Re-Cert Due Date 09/02/23  -MS         Time Calculation- PT    Total Timed Code Minutes- PT 19 minute(s)  -MS                User Key  (r) = Recorded By, (t) = Taken By, (c) = Cosigned By      Initials Name Provider Type    MS Jasbir Simmons, PT Physical Therapist                  Therapy Charges for Today       Code Description Service Date Service Provider Modifiers Qty    35740549782 HC PT EVAL LOW COMPLEXITY 2 8/30/2023 Jasbir Simmons, PT GP 1    30138797423 HC PT THER PROC EA 15 MIN 8/30/2023 Jasbir Simmons, PT GP 1            PT G-Codes  Outcome Measure Options: AM-PAC 6 Clicks Basic Mobility (PT)  AM-PAC 6 Clicks Score (PT): 20  PT Discharge Summary  Anticipated Discharge Disposition (PT): home with assist, home with home health    Jasbir Simmons, PT  8/30/2023

## 2023-08-30 NOTE — PLAN OF CARE
Goal Outcome Evaluation:      Patient is POD #0 for a Left total knee arthroplasty.  Patient is alert x4.  Dressing is dry and intact.  Hemovac and bandar drain are operating properly.  Patient ambulated from the stretcher to the bedside.  Ice applied.  Left lower extremity is elevated.  Patient has voided..  Ancef was ordered for antibiotic therapy.  Norco was order for pain management.  Up to chair.  No signs of distress noted.  Will continue to monitor and update accordingly.

## 2023-08-30 NOTE — ANESTHESIA PROCEDURE NOTES
Peripheral Block    Pre-sedation assessment completed: 8/30/2023 8:25 AM    Patient reassessed immediately prior to procedure    Patient location during procedure: pre-op  Start time: 8/30/2023 8:25 AM  Stop time: 8/30/2023 8:33 AM  Reason for block: at surgeon's request and post-op pain management  Performed by  Anesthesiologist: Sachin Stoddard MD  Preanesthetic Checklist  Completed: patient identified, IV checked, site marked, risks and benefits discussed, surgical consent, monitors and equipment checked, pre-op evaluation and timeout performed  Prep:  Pt Position: supine  Sterile barriers:cap, gloves, mask and sterile barriers  Prep: ChloraPrep  Patient monitoring: blood pressure monitoring, continuous pulse oximetry and EKG  Procedure    Sedation: yes  Performed under: local infiltration  Guidance:ultrasound guided    ULTRASOUND INTERPRETATION.  Using ultrasound guidance a 22 G gauge needle was placed in close proximity to the nerve, at which point, under ultrasound guidance anesthetic was injected in the area of the nerve and spread of the anesthesia was seen on ultrasound in close proximity thereto.  There were no abnormalities seen on ultrasound; a digital image was taken; and the patient tolerated the procedure with no complications. Images:still images obtained, printed/placed on chart (U/S to localize the nerve)    Laterality:left  Block Type:adductor canal block  Injection Technique:single-shot  Needle Type:echogenic  Needle Gauge:22 G  Resistance on Injection: less than 15 psi    Medications Used: ropivacaine (NAROPIN) 0.2 % injection - Injection   15 mL - 8/30/2023 8:33:00 AM      Post Assessment  Injection Assessment: negative aspiration for heme, no paresthesia on injection and incremental injection  Patient Tolerance:comfortable throughout block  Complications:no

## 2023-08-30 NOTE — DISCHARGE PLACEMENT REQUEST
"Vani Tolliver (68 y.o. Male)       Date of Birth   1955    Social Security Number       Address   [address] [place] [address]    Home Phone       MRN   [id number]       Restorationism   Restorationism    Marital Status                               Admission Date   8/30/23    Admission Type   Elective    Admitting Provider   Jesse Martinez MD    Attending Provider   Jsese Martinez MD    Department, Room/Bed   [address], P794/1       Discharge Date       Discharge Disposition   Home-Health Care [facility]    Discharge Destination                                 Attending Provider: Jesse Martinez MD    Allergies: No Known Allergies    Isolation: None   Infection: None   Code Status: Prior    Ht: 188 cm (74.02\")   Wt: 115 kg (254 lb 8 oz)    Admission Cmt: None   Principal Problem: Primary osteoarthritis of left knee [M17.12]                   Active Insurance as of 8/30/2023       Primary Coverage       Payor Plan Insurance Group Employer/Plan Group    [facility] MEDICARE REPLACEMENT [facility] MEDICARE REPLACEMENT [id number]       Payor Plan Address Payor Plan Phone Number Payor Plan Fax Number Effective Dates    [address] [address]   8/1/2022 - None Entered    [facility] [id number]         Subscriber Name Subscriber Birth Date Member ID       VANI TOLLIVER 1955 [id number]                     Emergency Contacts        (Rel.) Home Phone Work Phone Mobile Phone    Susan Tolliver (Spouse) [phone] -- [phone]    WeiUnruly (Son) -- -- [phone]            "

## 2023-08-30 NOTE — ANESTHESIA POSTPROCEDURE EVALUATION
Patient: Sachin Tolliver    Procedure Summary       Date: 08/30/23 Room / Location:  [facility] [address] [place] [address] [facility]    Anesthesia Start: 0937 Anesthesia Stop: 1132    Procedure: TOTAL KNEE ARTHROPLASTY (Left: Knee) Diagnosis:       Primary osteoarthritis of left knee      (Primary osteoarthritis of left knee [M17.12])    Surgeons: Jesse Martinez MD Provider: Lucy Vides MD    Anesthesia Type: general with block ASA Status: 3            Anesthesia Type: general with block    Vitals  Vitals Value Taken Time   /79 08/30/23 1245   Temp 36.4 øC (97.6 øF) 08/30/23 1215   Pulse 53 08/30/23 1258   Resp 16 08/30/23 1245   SpO2 98 % 08/30/23 1258   Vitals shown include unvalidated device data.        Post Anesthesia Care and Evaluation      Comments: Pt. Discharged prior to being evaluated by anesthesia.  Chart is reviewed and no complications are noted.  THIS CASE IS NOT MEDICALLY DIRECTED

## 2023-08-30 NOTE — ANESTHESIA PREPROCEDURE EVALUATION
Anesthesia Evaluation     Patient summary reviewed   NPO Solid Status: > 8 hours             Airway   Mallampati: III  TM distance: <3 FB  Neck ROM: limited  Possible difficult intubation  Dental      Pulmonary     breath sounds clear to auscultation  Cardiovascular   Exercise tolerance: good (4-7 METS)    Rhythm: regular  Rate: normal    (+) hypertension, valvular problems/murmurs, CAD, CABG      Neuro/Psych  (+) headaches, psychiatric history  GI/Hepatic/Renal/Endo    (+) obesity    Musculoskeletal     Abdominal    Substance History      OB/GYN          Other        ROS/Med Hx Other: S/p AVR                  Anesthesia Plan    ASA 3     general with block     (Cmac needed in past)  intravenous induction     Anesthetic plan, risks, benefits, and alternatives have been provided, discussed and informed consent has been obtained with: patient.

## 2023-08-30 NOTE — PLAN OF CARE
Goal Outcome Evaluation:  Plan of Care Reviewed With: patient           Outcome Evaluation: Pt. presents with typical post op impairments related to TKR surgery that include decreased ROM, decreased strength, and decreased balance.  Pt. will benefit from skilled inpt. P.T. to address his functional deficits and to assist pt. in regaining his maximum level of independence with functional mobility.      Anticipated Discharge Disposition (PT): home with assist, home with home health    Patient was not wearing a face mask during this therapy encounter. Therapist used appropriate personal protective equipment including eye protection, mask, and gloves.  Mask used was standard procedure mask. Appropriate PPE was worn during the entire therapy session. Hand hygiene was completed before and after therapy session. Patient is not in enhanced droplet precautions.

## 2023-08-30 NOTE — OP NOTE
Name: Sachin Tolliver    YOB: 1955    DATE OF SURGERY: 8/30/2023    PREOPERATIVE DIAGNOSIS: Left knee end-stage osteoarthritis    POSTOPERATIVE DIAGNOSIS: Left knee end-stage osteoarthritis    PROCEDURE PERFORMED: Left total knee replacement     SURGEON: Jesse Martinez M.D.    ASSISTANT: CELESTINE FELIX    A surgical assistant was integral in ensuring a successful outcome with this procedure.  The assistant was utilized to assist in positioning the patient, draping the patient, was used throughout the case to provide with retraction of tissues, suctioning of blood and body fluids for visualization, positioning of the extremity to allow for proper exposure so that I could perform the procedure.  Without the use of a surgical assistant during this procedure I feel that the outcome may have been compromised or would have been suboptimal or at risk for complications.    IMPLANTS: [employer]:     Implant Name Type Inv. Item Serial No.  Lot No. LRB No. Used Action   CMT BONE PALACOS R HI/VISC 1X40 - [id number] Implant CMT BONE PALACOS R HI/VISC 1X40  [facility] [id number] Left 1 Implanted   DEV CONTRL TISS STRATAFIX SYMM PDS PLUS CARLOS CT-1 60CM - [id number] Implant DEV CONTRL TISS STRATAFIX SYMM PDS PLUS CARLOS CT-1 60CM  [employer]  DIV OF J AND J [id number] Left 1 Implanted   DEV CONTRL TISS STRATAFIXSPIRALMNCRYL PLSPS2 REV3/0 45CM - [id number] Implant DEV CONTRL TISS STRATAFIXSPIRALMNCRYL PLSPS2 REV3/0 45CM  [employer]  DIV OF J AND J [id number] Left 1 Implanted   CMT BONE PALACOS R HI/VISC 1X40 - [id number] Implant CMT BONE PALACOS R HI/VISC 1X40  [facility] [id number] Left 1 Implanted   COMP FEM LEGION OXINIUM CR SZ8 LT - [id number] Implant COMP FEM LEGION OXINIUM CR SZ8 LT  [employer] [id number] Left 1 Implanted   PAT GEN2 BICONVEX [id number] - [id number] Implant PAT GEN2 BICONVEX [id number]  [employer] [id number] Left 1 Implanted   BASE TIB/KN GEN2 NONPOR TI SZ7 LT - [id number]  Implant BASE TIB/KN GEN2 NONPOR TI SZ7 LT  [employer] NEPHE[employer] [id number] Left 1 Implanted   INSRT ART/KN LEGION CR HF XLPE SZ7TO8 9MM - [id number] Implant INSRT ART/KN LEGION CR HF XLPE [id number] 9MM  [employer] [id number] Left 1 Implanted       Estimated Blood Loss: 200cc  Specimens : none  Complications: none    DESCRIPTION OF PROCEDURE: The patient was taken to the operating room and placed in the supine position. A sequential compression device was carefully placed on the non-operative leg. Preoperative antibiotics were administered. Surgical time out was performed. After adequate induction of anesthesia, the leg was prepped and draped in the usual sterile fashion, exsanguinated with an Esmarch bandage and the tourniquet inflated to 250 mmHg. A midline incision was performed followed by a medial parapatellar arthrotomy. The patella was subluxed laterally.  A portion of the fat pad, ACL, and anterior horns of the meniscus were excised.  A drill hole was then placed in the center of the femoral canal in line with the canal.  It was irrigated and suctioned.  The intramedullary sreedhar was then placed and a 5 degree distal valgus cut was performed after the block pinned in place appropriately.  Cut surface was then removed.  The sizing and rotation guide was then placed and seated appropriately.  It was sized and then the drill holes for the 4-in-1 cutting guide were placed in 3 degrees of external rotation based off of the posterior condyles.  The 4-in-1 cutting block was then placed and the femoral cuts were performed.  The excess bone was removed and the cut surfaces looked good.  At this point we placed the retractors around the proximal tibia and a slight release of the PCL fibers off of the posterior proximal tibia was performed.  We used the extramedullary tibial alignment guide and it was aligned appropriately and then the depth was set and the block pinned in place.  The tibial cut was then performed and the  alignment guide was removed.  The tibial cut was removed and the cut surface looked good.  The posterior horns of the menisci were then removed as well as the posterior osteophytes.  Flexion extension blocks were then used to check the balance of the knee. The tibial cut surface was then sized with the sizing templates and the tibial and femoral trial were then placed. The knee was placed in full extension and then the tibial tray rotation was then matched to the femoral rotation and marked.    Attention was then placed to the patella. The patella was noted to track centrally through range of motion. The patella was then sized with the trials. The thickness of the patella was then measured. The patella was resurfaced and the surrounding osteophytes were removed. The preoperative thickness was reproduced. The patella tracked centrally through range of motion.  We then checked the balance with the trial implants in place and there was excellent medial lateral and flexion-extension balance.  The tibial and femoral arrays were then removed.  The checkpoint markers were then removed.   At this point all trial components were removed, the knee was copiously irrigated with pulsed lavage, and the knee was injected with anesthetic cocktail solution. The cut surfaces were then dried with clean lap sponges, and the components were cemented tibia, followed by femur, then patella. The knee was held in full extension and all excess cement was removed. The knee was held still until the cement had completely hardened. We then placed the trial polyethylene spacer which resulted in full extension and excellent flexion-extension balance. We placed the final polyethylene spacer.   The knee was then copiously irrigated. The tourniquet was then released. There was excellent hemostasis. We placed a one-eighth inch Hemovac drain. We closed the knee in multiple layers in standard fashion. Sterile dressing were applied. At the end of the  case, the sponge and needle counts were reported as being correct. There were no known complications. The patient was then transported to the recovery room.      Jesse Martinez M.D.

## 2023-08-30 NOTE — ANESTHESIA PROCEDURE NOTES
Airway  Urgency: elective    Date/Time: 8/30/2023 9:51 AM  Airway not difficult    General Information and Staff    Patient location during procedure: OR  Anesthesiologist: Lucy Vides MD  CRNA/CAA: Mariaelena Martin CRNA    Indications and Patient Condition  Indications for airway management: airway protection    Preoxygenated: yes  MILS not maintained throughout  Mask difficulty assessment: 2 - vent by mask + OA or adjuvant +/- NMBA    Final Airway Details  Final airway type: endotracheal airway      Successful airway: ETT  Cuffed: yes   Successful intubation technique: video laryngoscopy  Facilitating devices/methods: intubating stylet  Endotracheal tube insertion site: oral  Blade: CMAC  Blade size: D  ETT size (mm): 8.0  Cormack-Lehane Classification: grade I - full view of glottis (Grade I indirect view w/ VL)  Placement verified by: chest auscultation and capnometry   Cuff volume (mL): 7  Measured from: teeth  ETT/EBT  to teeth (cm): 23  Number of attempts at approach: 1  Assessment: lips, teeth, and gum same as pre-op and atraumatic intubation    Additional Comments  Airway exam prior to DL, teeth/lips inspected. Preoxygenated with 100% O2; sniffing position, IV induction, eyes taped. Easy mask ventilation w/ OA. Atraumatic intubation. ETT connected to vent. Confirmed EBBS, +EtCO2. Lips and teeth inspected, intact, no damage.

## 2023-08-31 ENCOUNTER — APPOINTMENT (OUTPATIENT)
Dept: CT IMAGING | Facility: HOSPITAL | Age: 68
End: 2023-08-31
Payer: MEDICARE

## 2023-08-31 ENCOUNTER — READMISSION MANAGEMENT (OUTPATIENT)
Dept: CALL CENTER | Facility: HOSPITAL | Age: 68
End: 2023-08-31
Payer: MEDICARE

## 2023-08-31 ENCOUNTER — HOSPITAL ENCOUNTER (EMERGENCY)
Facility: HOSPITAL | Age: 68
Discharge: HOME | End: 2023-08-31
Attending: EMERGENCY MEDICINE
Payer: MEDICARE

## 2023-08-31 VITALS
DIASTOLIC BLOOD PRESSURE: 68 MMHG | SYSTOLIC BLOOD PRESSURE: 113 MMHG | WEIGHT: 254.5 LBS | BODY MASS INDEX: 32.66 KG/M2 | OXYGEN SATURATION: 93 % | HEIGHT: 74 IN | TEMPERATURE: 97.8 F | RESPIRATION RATE: 16 BRPM | HEART RATE: 100 BPM

## 2023-08-31 VITALS
TEMPERATURE: 97.9 F | BODY MASS INDEX: 33.87 KG/M2 | HEART RATE: 64 BPM | WEIGHT: 263.9 LBS | DIASTOLIC BLOOD PRESSURE: 70 MMHG | RESPIRATION RATE: 16 BRPM | SYSTOLIC BLOOD PRESSURE: 115 MMHG | HEIGHT: 74 IN | OXYGEN SATURATION: 93 %

## 2023-08-31 DIAGNOSIS — R55 VASOVAGAL SYNCOPE: Primary | ICD-10-CM

## 2023-08-31 LAB
ALBUMIN SERPL-MCNC: 3.9 G/DL (ref 3.5–5.2)
ALBUMIN/GLOB SERPL: 1.7 G/DL
ALP SERPL-CCNC: 51 U/L (ref 39–117)
ALT SERPL W P-5'-P-CCNC: 17 U/L (ref 1–41)
ANION GAP SERPL CALCULATED.3IONS-SCNC: 8 MMOL/L (ref 5–15)
AST SERPL-CCNC: 25 U/L (ref 1–40)
BASOPHILS # BLD AUTO: 0.05 10*3/MM3 (ref 0–0.2)
BASOPHILS NFR BLD AUTO: 0.5 % (ref 0–1.5)
BILIRUB SERPL-MCNC: 1.1 MG/DL (ref 0–1.2)
BUN SERPL-MCNC: 20 MG/DL (ref 8–23)
BUN/CREAT SERPL: 15.5 (ref 7–25)
CALCIUM SPEC-SCNC: 9 MG/DL (ref 8.6–10.5)
CHLORIDE SERPL-SCNC: 101 MMOL/L (ref 98–107)
CO2 SERPL-SCNC: 28 MMOL/L (ref 22–29)
CREAT SERPL-MCNC: 1.29 MG/DL (ref 0.76–1.27)
DEPRECATED RDW RBC AUTO: 41.1 FL (ref 37–54)
EGFRCR SERPLBLD CKD-EPI 2021: 61.2 ML/MIN/1.73
EOSINOPHIL # BLD AUTO: 0.02 10*3/MM3 (ref 0–0.4)
EOSINOPHIL NFR BLD AUTO: 0.2 % (ref 0.3–6.2)
ERYTHROCYTE [DISTWIDTH] IN BLOOD BY AUTOMATED COUNT: 12.7 % (ref 12.3–15.4)
GEN 5 2HR TROPONIN T REFLEX: 14 NG/L
GLOBULIN UR ELPH-MCNC: 2.3 GM/DL
GLUCOSE SERPL-MCNC: 127 MG/DL (ref 65–99)
HCT VFR BLD AUTO: 32.4 % (ref 37.5–51)
HCT VFR BLD AUTO: 34.8 % (ref 37.5–51)
HGB BLD-MCNC: 11.2 G/DL (ref 13–17.7)
HGB BLD-MCNC: 11.7 G/DL (ref 13–17.7)
IMM GRANULOCYTES # BLD AUTO: 0.04 10*3/MM3 (ref 0–0.05)
IMM GRANULOCYTES NFR BLD AUTO: 0.4 % (ref 0–0.5)
LYMPHOCYTES # BLD AUTO: 0.77 10*3/MM3 (ref 0.7–3.1)
LYMPHOCYTES NFR BLD AUTO: 7 % (ref 19.6–45.3)
MCH RBC QN AUTO: 30.8 PG (ref 26.6–33)
MCHC RBC AUTO-ENTMCNC: 34.6 G/DL (ref 31.5–35.7)
MCV RBC AUTO: 89 FL (ref 79–97)
MONOCYTES # BLD AUTO: 1.41 10*3/MM3 (ref 0.1–0.9)
MONOCYTES NFR BLD AUTO: 12.9 % (ref 5–12)
NEUTROPHILS NFR BLD AUTO: 79 % (ref 42.7–76)
NEUTROPHILS NFR BLD AUTO: 8.67 10*3/MM3 (ref 1.7–7)
NRBC BLD AUTO-RTO: 0 /100 WBC (ref 0–0.2)
PLATELET # BLD AUTO: 153 10*3/MM3 (ref 140–450)
PMV BLD AUTO: 11.9 FL (ref 6–12)
POTASSIUM SERPL-SCNC: 4.8 MMOL/L (ref 3.5–5.2)
PROT SERPL-MCNC: 6.2 G/DL (ref 6–8.5)
QT INTERVAL: 463 MS
QTC INTERVAL: 439 MS
RBC # BLD AUTO: 3.64 10*6/MM3 (ref 4.14–5.8)
SODIUM SERPL-SCNC: 137 MMOL/L (ref 136–145)
TROPONIN T DELTA: -4 NG/L
TROPONIN T SERPL HS-MCNC: 18 NG/L
WBC NRBC COR # BLD: 10.96 10*3/MM3 (ref 3.4–10.8)

## 2023-08-31 PROCEDURE — 93005 ELECTROCARDIOGRAM TRACING: CPT | Performed by: PHYSICIAN ASSISTANT

## 2023-08-31 PROCEDURE — 97530 THERAPEUTIC ACTIVITIES: CPT

## 2023-08-31 PROCEDURE — 85018 HEMOGLOBIN: CPT | Performed by: NURSE PRACTITIONER

## 2023-08-31 PROCEDURE — 99285 EMERGENCY DEPT VISIT HI MDM: CPT

## 2023-08-31 PROCEDURE — 71275 CT ANGIOGRAPHY CHEST: CPT

## 2023-08-31 PROCEDURE — 84484 ASSAY OF TROPONIN QUANT: CPT | Performed by: PHYSICIAN ASSISTANT

## 2023-08-31 PROCEDURE — 25510000001 IOPAMIDOL PER 1 ML: Performed by: EMERGENCY MEDICINE

## 2023-08-31 PROCEDURE — 85025 COMPLETE CBC W/AUTO DIFF WBC: CPT | Performed by: PHYSICIAN ASSISTANT

## 2023-08-31 PROCEDURE — 85014 HEMATOCRIT: CPT | Performed by: NURSE PRACTITIONER

## 2023-08-31 PROCEDURE — 36415 COLL VENOUS BLD VENIPUNCTURE: CPT

## 2023-08-31 PROCEDURE — 25010000002 CEFAZOLIN IN DEXTROSE 2-4 GM/100ML-% SOLUTION: Performed by: NURSE PRACTITIONER

## 2023-08-31 PROCEDURE — 80053 COMPREHEN METABOLIC PANEL: CPT | Performed by: PHYSICIAN ASSISTANT

## 2023-08-31 PROCEDURE — G0378 HOSPITAL OBSERVATION PER HR: HCPCS

## 2023-08-31 PROCEDURE — 99024 POSTOP FOLLOW-UP VISIT: CPT | Performed by: NURSE PRACTITIONER

## 2023-08-31 RX ORDER — SODIUM CHLORIDE 0.9 % (FLUSH) 0.9 %
10 SYRINGE (ML) INJECTION AS NEEDED
Status: DISCONTINUED | OUTPATIENT
Start: 2023-08-31 | End: 2023-08-31 | Stop reason: HOSPADM

## 2023-08-31 RX ADMIN — ASPIRIN 81 MG: 81 TABLET, COATED ORAL at 08:59

## 2023-08-31 RX ADMIN — CITALOPRAM 20 MG: 20 TABLET, FILM COATED ORAL at 08:59

## 2023-08-31 RX ADMIN — CEFAZOLIN SODIUM 2000 MG: 2 INJECTION, SOLUTION INTRAVENOUS at 00:46

## 2023-08-31 RX ADMIN — METOPROLOL TARTRATE 50 MG: 50 TABLET, FILM COATED ORAL at 08:59

## 2023-08-31 RX ADMIN — HYDROCODONE BITARTRATE AND ACETAMINOPHEN 1 TABLET: 7.5; 325 TABLET ORAL at 06:30

## 2023-08-31 RX ADMIN — IOPAMIDOL 95 ML: 755 INJECTION, SOLUTION INTRAVENOUS at 15:32

## 2023-08-31 RX ADMIN — SODIUM CHLORIDE 1000 ML: 9 INJECTION, SOLUTION INTRAVENOUS at 14:45

## 2023-08-31 NOTE — PLAN OF CARE
Goal Outcome Evaluation:  Plan of Care Reviewed With: patient        Progress: improving  Outcome Evaluation: Pt POD 1 LTKA. ambulating with walker and assist of 1. Patient denies pain for all of shift and no pain meds given. Discharge probable for today.

## 2023-08-31 NOTE — ED PROVIDER NOTES
EMERGENCY DEPARTMENT ENCOUNTER    Room Number:  HA1/A  Date of encounter:  8/31/2023  PCP: Milvia Briceno MD  Historian: Patient, spouse  Chronic or social conditions impacting care (social determinants of health): Nothing        HPI:  Chief Complaint: Syncope  A complete HPI/ROS/PMH/PSH/SH/FH are unobtainable due to: Nothing     Context: Sachin Tolliver is a 68 y.o. male who presents to the ED c/o syncopal episode prior to arrival.  Patient had left knee replacement surgery yesterday.  The patient did well overnight.  Prior to being discharged this morning the patien had an episode of low blood pressure and near syncope.  He was kept 2 additional hours for monitoring and ultimately discharged.  While walking into his house this morning the patient began to feel lightheaded and dizzy.  His he began to pass out.  His wife caught him and lowered him to the ground.  She states he did briefly lose consciousness.  He did have urinary incontinence.  There was no seizure-like activity.  The patient denies having any chest pain, shortness of breath prior to this.  At this time the patient states he feels malaised however denies any focal complaints.  He does complain of moderate left knee pain.  He has never passed out before.  He does have a history of CABG, aortic valve replacement.    Review of prior external notes (non-ED):   I reviewed op note from yesterday.  Patient had left total knee arthroplasty.    Review of prior external test results outside of this encounter:  I reviewed H&H from this morning.  Hemoglobin 11.7 compared to 14.3, 2 weeks ago.    PAST MEDICAL HISTORY  Active Ambulatory Problems     Diagnosis Date Noted    Benign essential hypertension 12/08/2015    Hereditary hemolytic anemia 12/08/2015    Hyperlipidemia 12/08/2015    Migraine 12/08/2015    Situational depression 12/08/2015    Aortic valve calcification 03/17/2016    Obesity (BMI 30.0-34.9) 06/20/2018    S/P aortic valve replacement with tissue  07/23/2018    S/P CABG x 1 08/13/2018    Primary osteoarthritis of left knee 06/21/2023     Resolved Ambulatory Problems     Diagnosis Date Noted    Aortic stenosis due to bicuspid aortic valve 06/20/2018    Nonrheumatic aortic valve stenosis 06/21/2018    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     Past Medical History:   Diagnosis Date    Broken nose     CAD (coronary artery disease)     Fracture, radius     Heart murmur     Hemolytic anemia     Hypertension     Left knee pain     Mild mitral regurgitation     Nonrheumatic aortic (valve) stenosis     Pulmonic regurgitation     Reactive depression (situational)     Severe aortic valve stenosis     Tennis elbow     Tricuspid regurgitation          PAST SURGICAL HISTORY  Past Surgical History:   Procedure Laterality Date    ADENOIDECTOMY  1/1/1963    AORTIC VALVE REPAIR/REPLACEMENT N/A 07/10/2018    Procedure: INTROP MARIAA; AORTIC VALVE REPLACEMENT; CORONARY ARTERY BYPASS X1 UTILIZING THE ENDOSCOPICALLY HARVESTED LEFT GREATER SAPHENOUS VEIN; PRP;  Surgeon: Hudson Vidales MD;  Location: [facility] MAIN OR;  Service: Cardiothoracic    CARDIAC CATHETERIZATION N/A 07/09/2018    Procedure: Right and Left Heart Cath;  Surgeon: Alexandria Ashraf MD;  Location: [facility] CATH INVASIVE LOCATION;  Service: Cardiovascular    CARDIAC CATHETERIZATION N/A 07/09/2018    Procedure: Coronary angiography;  Surgeon: Alexandria Ashraf MD;  Location: [facility] CATH INVASIVE LOCATION;  Service: Cardiovascular    CARDIAC VALVE REPLACEMENT  7/10/2018    COLECTOMY PARTIAL / TOTAL      COLON SURGERY  12/25/2011    COLONOSCOPY N/A 02/10/2020    Procedure: COLONOSCOPY;  Surgeon: Kimberli Joshi MD;  Location: [facility] ENDOSCOPY;  Service: General;  Laterality: N/A;  PRE- SCREENING  POST-- DIVERTICULOSIS    CORONARY ARTERY BYPASS GRAFT      OTHER SURGICAL HISTORY      [place] Vaginalis Excision of Hydrocele Left    TONSILLECTOMY      TOTAL KNEE ARTHROPLASTY Left 8/30/2023    Procedure: TOTAL KNEE ARTHROPLASTY;  Surgeon:  Jesse Martinez MD;  Location: [facility];  Service: Orthopedics;  Laterality: Left;         FAMILY HISTORY  Family History   Problem Relation Age of Onset    Rheumatologic disease Mother         Arthritis in both hands    Arthritis Mother     Anemia Father         Hemolytic    Stroke Paternal Grandfather     Malig Hyperthermia Neg Hx          SOCIAL HISTORY  Social History     Socioeconomic History    Marital status:      Spouse name: Yaneth Tolliver    Number of children: 2    Years of education: 14   Tobacco Use    Smoking status: Never    Smokeless tobacco: Never   Vaping Use    Vaping Use: Never used   Substance and Sexual Activity    Alcohol use: Yes     Alcohol/week: 4.0 standard drinks     Types: 4 Cans of beer per week     Comment: Caffeine Use    Drug use: No    Sexual activity: Yes     Partners: Female     Birth control/protection: Surgical     Comment: with wife only!         ALLERGIES  Patient has no known allergies.        REVIEW OF SYSTEMS  All systems reviewed and negative except for those discussed in HPI.       PHYSICAL EXAM    I have reviewed the triage vital signs and nursing notes.    ED Triage Vitals [08/31/23 1353]   Temp Heart Rate Resp BP SpO2   97.9 °F (36.6 °C) 56 16 108/70 95 %      Temp src Heart Rate Source Patient Position BP Location FiO2 (%)   -- -- -- -- --       Physical Exam  GENERAL: Alert, oriented, well-appearing, not distressed  HENT: head atraumatic, no nuchal rigidity  EYES: no scleral icterus, EOMI  CV: regular rhythm, bradycardic rate, no murmur  RESPIRATORY: normal effort, CTA  ABDOMEN: soft, nontender  MUSCULOSKELETAL: Left knee covered with a bandage.  No visible bleeding, erythema.  No calf swelling or tenderness.  Compartments soft bilaterally.  NEURO: alert, moves all extremities, follows commands  SKIN: warm, dry        LAB RESULTS  Recent Results (from the past 24 hour(s))   Hemoglobin & Hematocrit, Blood    Collection Time: 08/31/23  5:14 AM    Specimen:  Blood   Result Value Ref Range    Hemoglobin 11.7 (L) 13.0 - 17.7 g/dL    Hematocrit 34.8 (L) 37.5 - 51.0 %   ECG 12 Lead Syncope    Collection Time: 08/31/23  2:08 PM   Result Value Ref Range    QT Interval 463 ms    QTC Interval 439 ms   Comprehensive Metabolic Panel    Collection Time: 08/31/23  2:44 PM    Specimen: Blood   Result Value Ref Range    Glucose 127 (H) 65 - 99 mg/dL    BUN 20 8 - 23 mg/dL    Creatinine 1.29 (H) 0.76 - 1.27 mg/dL    Sodium 137 136 - 145 mmol/L    Potassium 4.8 3.5 - 5.2 mmol/L    Chloride 101 98 - 107 mmol/L    CO2 28.0 22.0 - 29.0 mmol/L    Calcium 9.0 8.6 - 10.5 mg/dL    Total Protein 6.2 6.0 - 8.5 g/dL    Albumin 3.9 3.5 - 5.2 g/dL    ALT (SGPT) 17 1 - 41 U/L    AST (SGOT) 25 1 - 40 U/L    Alkaline Phosphatase 51 39 - 117 U/L    Total Bilirubin 1.1 0.0 - 1.2 mg/dL    Globulin 2.3 gm/dL    A/G Ratio 1.7 g/dL    BUN/Creatinine Ratio 15.5 7.0 - 25.0    Anion Gap 8.0 5.0 - 15.0 mmol/L    eGFR 61.2 >60.0 mL/min/1.73   High Sensitivity Troponin T    Collection Time: 08/31/23  2:44 PM    Specimen: Blood   Result Value Ref Range    HS Troponin T 18 (H) <15 ng/L   CBC Auto Differential    Collection Time: 08/31/23  2:44 PM    Specimen: Blood   Result Value Ref Range    WBC 10.96 (H) 3.40 - 10.80 10*3/mm3    RBC 3.64 (L) 4.14 - 5.80 10*6/mm3    Hemoglobin 11.2 (L) 13.0 - 17.7 g/dL    Hematocrit 32.4 (L) 37.5 - 51.0 %    MCV 89.0 79.0 - 97.0 fL    MCH 30.8 26.6 - 33.0 pg    MCHC 34.6 31.5 - 35.7 g/dL    RDW 12.7 12.3 - 15.4 %    RDW-SD 41.1 37.0 - 54.0 fl    MPV 11.9 6.0 - 12.0 fL    Platelets 153 140 - 450 10*3/mm3    Neutrophil % 79.0 (H) 42.7 - 76.0 %    Lymphocyte % 7.0 (L) 19.6 - 45.3 %    Monocyte % 12.9 (H) 5.0 - 12.0 %    Eosinophil % 0.2 (L) 0.3 - 6.2 %    Basophil % 0.5 0.0 - 1.5 %    Immature Grans % 0.4 0.0 - 0.5 %    Neutrophils, Absolute 8.67 (H) 1.70 - 7.00 10*3/mm3    Lymphocytes, Absolute 0.77 0.70 - 3.10 10*3/mm3    Monocytes, Absolute 1.41 (H) 0.10 - 0.90 10*3/mm3     Eosinophils, Absolute 0.02 0.00 - 0.40 10*3/mm3    Basophils, Absolute 0.05 0.00 - 0.20 10*3/mm3    Immature Grans, Absolute 0.04 0.00 - 0.05 10*3/mm3    nRBC 0.0 0.0 - 0.2 /100 WBC   High Sensitivity Troponin T 2Hr    Collection Time: 08/31/23  4:28 PM    Specimen: Blood   Result Value Ref Range    HS Troponin T 14 <15 ng/L    Troponin T Delta -4 (L) >=-4 - <+4 ng/L       Ordered the above labs and independently reviewed the results.        RADIOLOGY  CT Angiogram Chest Pulmonary Embolism    Result Date: 8/31/2023  Examination: CTA of the chest with contrast pulmonary embolus protocol  TECHNIQUE: CTA of the chest with contrast per pulmonary embolus protocol. Radiation dose reduction techniques were utilized, including automated exposure control and exposure modulation based on body size. 3D reconstructions were performed   HISTORY: Syncope and recent surgery  COMPARISON: 12/27/2019  FINDINGS: The contrast bolus is good. No thrombus is seen in the main, segmental, or visualized subsegmental pulmonary arteries. There is no evidence of right heart strain.  Dependent atelectasis is seen in the lungs. There is old granulomatous disease. No consolidation, pleural effusion, significant size noncalcified pulmonary nodule or mass, or pneumothorax are seen. The central airways are patent.  The heart is normal in size and configuration, without pericardial effusion. Coronary artery and aortic valve calcifications are seen status post median sternotomy with coronary artery bypass grafting. The mediastinal vasculature is normal in caliber. No enlarged supraclavicular, axillary, mediastinal, or hilar lymph nodes are demonstrated.  Limited evaluation of the upper abdomen is unremarkable.  Bone windows demonstrate degenerative changes, without suspicious osseous lesion seen.      No pulmonary embolus seen. No acute or suspicious intrathoracic process seen. Incidental findings as above  This report was finalized on 8/31/2023 5:50  PM by Dr. Ramon Mar M.D.       I ordered the above noted radiological studies. Reviewed by me and discussed with radiologist.  See dictation for official radiology interpretation.      MEDICATIONS GIVEN IN ER    Medications   sodium chloride 0.9 % flush 10 mL (has no administration in time range)   sodium chloride 0.9 % bolus 1,000 mL (0 mL Intravenous Stopped 8/31/23 1625)   iopamidol (ISOVUE-370) 76 % injection 100 mL (95 mL Intravenous Given 8/31/23 1532)         ADDITIONAL ORDERS CONSIDERED BUT NOT ORDERED:  Considered admission however patient's symptoms have resolved.  He has stable vitals.      PROGRESS, DATA ANALYSIS, CONSULTS, AND MEDICAL DECISION MAKING    All labs have been independently interpreted by myself.  All radiology studies have been independently interpreted by myself and discussed with radiologist dictating the report.   EKG's independently interpreted by myself.  Discussion below represents my analysis of pertinent findings related to patient's condition, differential diagnosis, treatment plan and final disposition.    I have discussed case with Dr. Barillas, emergency room physician.  He has performed his own bedside examination and agrees with treatment plan.    ED Course as of 08/31/23 2104   Thu Aug 31, 2023   1420 Patient presents with a syncopal episode today after being discharged from the hospital after knee replacement surgery yesterday.  He denies any chest pain, shortness of breath.  He is hypotensive here.  Differential diagnoses include but not limited to vasovagal syncope, PE, cardiac arrhythmia, anemia. [EE]   1426 EKG independently interpreted myself.  Time 1408.  Sinus bradycardia, rate 54.  Normal P/MARKUS.  QRS shows nonspecific IVCD.  No significant ST abnormalities.  Similar to prior EKG from 8/21/2023. [EE]   1455 WBC(!): 10.96 [EE]   1455 Hemoglobin(!): 11.2 [EE]   1519 Creatinine(!): 1.29  This was 1.12, 2 weeks ago [EE]   1719 Troponin T Delta(!): -4 [EE]   1754 CT  Angiogram Chest Pulmonary Embolism  Radiology report reviewed, no evidence of PE or other acute emergent process [DC]   1807 Recheck of patient.  He is feeling better.  He has stable vitals.  He is able to stand without any dizziness.  I suspect this is likely combination of pain medicine and pain from standing.  We will discharge. [EE]      ED Course User Index  [DC] Sulaiman Barillas MD  [EE] Ander Hernandez PA       AS OF 21:04 EDT VITALS:    BP - 115/70  HR - 64  TEMP - 97.9 °F (36.6 °C)  O2 SATS - 93%        DIAGNOSIS  Final diagnoses:   Vasovagal syncope         DISPOSITION  Discharged      Dictated utilizing Dragon dictation     Ander Hernandez PA  08/31/23 [id number]

## 2023-08-31 NOTE — PLAN OF CARE
Goal Outcome Evaluation:  Plan of Care Reviewed With: patient           Outcome Evaluation: Pt. is currently independent/SBA with functional mobility and has no further questions/concerns regarding functional mobility or home safety.  Encouraged pt. to continue ther. ex. program 2-3 x's daily and to ambulate every 1-2 hours once home. Plan for discharge home this date.  Will sign off.      Anticipated Discharge Disposition (PT): home with assist, home with home health    Patient was not wearing a face mask during this therapy encounter. Therapist used appropriate personal protective equipment including eye protection, mask, and gloves.  Mask used was standard procedure mask. Appropriate PPE was worn during the entire therapy session. Hand hygiene was completed before and after therapy session. Patient is not in enhanced droplet precautions.

## 2023-08-31 NOTE — ED NOTES
Patient from home called EMS was discharged from [facility] this morning from a knee surgery, he went home and he passed out. Patient passed out x2-patient had low blood pressure. Patient has dizziness and nausea at this time. Patient blood glucose was 147

## 2023-08-31 NOTE — PLAN OF CARE
Goal Outcome Evaluation:      Patient is POD #1 for a Left total knee arthroplasty.  Patient is alert x4.  Dressing is dry and intact. Tito drain is operating properly. Norco was ordered for pain management.  Patient complained of being light headed this am.  Blood pressure was retaken with no signs of dizziness noted.  Blood pressure retaken was 113/68.  Patient to discharge today. Will continue to monitor and update accordingly.

## 2023-08-31 NOTE — OUTREACH NOTE
Prep Survey      Flowsheet Row Responses   [facility] [place] operated by [facility] facility patient discharged from? [place]   Is LACE score < 7 ? Yes   Eligibility [facility]   Date of Admission 08/30/23   Date of Discharge 08/31/23   Discharge Disposition Home-Health Care Sv   Discharge diagnosis Primary osteoarthritis of left knee   Does the patient have one of the following disease processes/diagnoses(primary or secondary)? Total Joint Replacement   Does the patient have Home health ordered? Yes   What is the Home health agency?  [facility]  [facility]   Is there a DME ordered? No   Medication alerts for this patient see AVS   General alerts for this patient TOTAL KNEE ARTHROPLASTY   Prep survey completed? Yes            Blanca GRULLON - Registered Nurse

## 2023-08-31 NOTE — ED PROVIDER NOTES
Pt presents to the ED c/o a syncopal episode on return home from the hospital today, had a knee replacement yesterday.  Got out of the car and passed out on his way into the house.  Apparently had some low blood pressure this morning with near syncope prior to discharge from the hospital.  Denies any chest pain or shortness of breath.  Feeling better at current time.     On exam,   General: No acute distress, nontoxic  HEENT: Mucous membranes moist, atraumatic, EOMI  Neck: Full ROM  Pulm: Symmetric chest rise, nonlabored, lungs CTAB  Cardiovascular: Regular rate and rhythm, intact distal pulses  GI: Soft, nontender, nondistended, no rebound, no guarding, bowel sounds present  MSK: Full ROM, no deformity, left knee without overt increased bruising or swelling  Skin: Warm, dry  Neuro: Awake, alert, oriented x 4, GCS 15, moving all extremities, no focal deficits  Psych: Calm, cooperative          Plan:   ED Course as of 08/31/23 1814   Thu Aug 31, 2023   1420 Patient presents with a syncopal episode today after being discharged from the hospital after knee replacement surgery yesterday.  He denies any chest pain, shortness of breath.  He is hypotensive here.  Differential diagnoses include but not limited to vasovagal syncope, PE, cardiac arrhythmia, anemia. [EE]   1426 EKG independently interpreted myself.  Time 1408.  Sinus bradycardia, rate 54.  Normal P/MARKUS.  QRS shows nonspecific IVCD.  No significant ST abnormalities.  Similar to prior EKG from 8/21/2023. [EE]   1455 WBC(!): 10.96 [EE]   1455 Hemoglobin(!): 11.2 [EE]   1519 Creatinine(!): 1.29  This was 1.12, 2 weeks ago [EE]   1719 Troponin T Delta(!): -4 [EE]   1754 CT Angiogram Chest Pulmonary Embolism  Radiology report reviewed, no evidence of PE or other acute emergent process [DC]   1807 Recheck of patient.  He is feeling better.  He has stable vitals.  He is able to stand without any dizziness.  I suspect this is likely combination of pain medicine and  pain from standing.  We will discharge. [EE]      ED Course User Index  [DC] Sulaiman Barillas MD  [EE] Ander Hernandez PA     Plan for labs and CTA of the chest, overall suspect probably more of an orthostatic/vasovagal event.  He is well-appearing with no acute distress.    CTA of the chest unremarkable, safe for discharge.  All questions and concerns addressed.  ED return for worsening symptoms as needed.     Attestation:  The ADAN and I have discussed this patient's history, physical exam, and treatment plan.  I have reviewed the documentation and personally had a face to face interaction with the patient. I affirm the documentation and agree with the treatment and plan.  The attached note describes my personal findings.            Sulaiman Barillas MD  08/31/23 [id number]

## 2023-08-31 NOTE — DISCHARGE SUMMARY
Patient Name: Sachin Tolliver  Patient YOB: 1955    Date of Admission:  8/30/2023  Date of Discharge:  8/31/2023  Discharge Diagnosis: [place] ARTHRP KNE CONDYLE&PLATU MEDIAL&LAT COMPARTMENTS [68934] (TOTAL KNEE ARTHROPLASTY)  Presenting Problem/History of Present Illness: Primary osteoarthritis of left knee [M17.12]  Admitting Physician: Dr Jesse Martinez  Consults:   Consults       No orders found for last 30 day(s).            DETAILS OF HOSPITAL STAY:  Patient is a 68 y.o. male was admitted to the floor following the above procedure and underwent an uncomplicated hospital stay.  Patient did well with physical therapy and was ambulating well without problems.  On the day of discharge the wound was clean, dry and intact and calf was soft and nontender and Homans sign was negative.  Patient was tolerating  without problems.  Patient will be discharged home.    Condition on Discharge:  Stable    Vital Signs  Temp:  [97.3 °F (36.3 °C)-98.5 °F (36.9 °C)] 98.4 °F (36.9 °C)  Heart Rate:  [52-70] 61  Resp:  [16-18] 16  BP: ()/(60-94) 128/78    LABS:      Admission on 08/30/2023   Component Date Value Ref Range Status    Hemoglobin 08/31/2023 11.7 (L)  13.0 - 17.7 g/dL Final    Hematocrit 08/31/2023 34.8 (L)  37.5 - 51.0 % Final       No results found.    Discharge Medications     Discharge Medications        New Medications        Instructions Start Date   aspirin 81 MG EC tablet  Replaces: aspirin 81 MG tablet   Take 1 tablet by mouth twice daily for 14 days; then take 1 daily for 28 days.      HYDROcodone-acetaminophen 7.5-325 MG per tablet  Commonly known as: NORCO   1 tablet, Oral, Every 4 Hours PRN      meloxicam 15 MG tablet  Commonly known as: MOBIC   15 mg, Oral, Daily      ondansetron 4 MG tablet  Commonly known as: Zofran   Take 1 tablet by mouth Every 8 (Eight) Hours As Needed for Nausea or Vomiting.      pantoprazole 40 MG EC tablet  Commonly known as: PROTONIX   40 mg, Oral, Daily       polyethylene glycol 17 GM/SCOOP powder  Commonly known as: MIRALAX   Mix one capful (17 g) in liquid and take by mouth 2 (Two) Times a Day for 7 days.             Continue These Medications        Instructions Start Date   atorvastatin 40 MG tablet  Commonly known as: LIPITOR   Take 1 tablet by mouth every night.      butalbital-acetaminophen-caffeine -40 MG per tablet  Commonly known as: FIORICET, ESGIC   Take 1 tablet by mouth every 6 (Six) hours as needed for headache.      citalopram 20 MG tablet  Commonly known as: CeleXA   20 mg, Oral, Daily      metoprolol tartrate 50 MG tablet  Commonly known as: LOPRESSOR   50 mg, Oral, 2 Times Daily             Stop These Medications      aspirin 81 MG tablet  Replaced by: aspirin 81 MG EC tablet     Chlorhexidine Gluconate Cloth 2 % pads              Discharge Instructions: Patient is to continue with physical therapy exercises daily and continue working with the physical therapist as ordered. Patient may weight bear as tolerated. Apply ice regularly. Patient may ice for long periods of time as long as ice is not directly on the skin. Patient instructed on frequent calf pumping exercises.  Patient also instructed on incentive spirometer during hospitalization and encouraged to continue to use at home regularly.    Dressing: The dressing is designed to be left in place until you return to the office in 2 weeks.  The suction unit should stop functioning at 7 days and the green light will switch to yellow.  At that point the suction unit and tubing can be disconnected at the port closest to the dressing.  The suction unit and tubing may be discarded.  You may shower immediately upon return home, you will need to turn the pump off by depressing the orange button once and then you may disconnect the pump and tubing at the connection port.  After showering, shake off the excess water and reattach the tubing.  Restart the pump by depressing the orange button one time and  you will notice the green light flashing again.  If the dressing becomes dislodged or saturated it should be changed. Please refer to the [employer] information sheet if you have any questions about the dressing.  If you have a home health nurse or therapist they can be contacted to assist with dressing change or repair. You may also call the [facility] dressing hotline for questions related to the dressing ([phone]). If there are still other problems or questions related to the dressing despite these measures then you can contact Sarah at our office [phone].  If for some reason the CLEMENT[employer] dressing is removed, after 7 days the wound can be gently cleaned with antibacterial soap then allowed to dry and covered with a dry sterile dressing. The wound should be covered at all times except while showering.  Patient may change dressings daily and prn using sterile 4x4 and paper tape, and should call if any unusual drainage, redness or swelling.*  Follow up appointment in 2 weeks - patient to call the office at [phone] to schedule.  Patient will be discharged on aspirin 81mg BID x 2 weeks, then daily x 4 weeks    Discharge Diagnosis:    Primary osteoarthritis of left knee      Follow-up Appointments  Future Appointments   Date Time Provider Department Center   9/19/2023  8:40 AM Jesse Martinez MD [facility]   8/23/2024  8:00 AM Иван Irene MD [facility]  08/31/23  08:23 EDT

## 2023-08-31 NOTE — PROGRESS NOTES
Continued Stay Note  [facility]     Patient Name: Sachin Tolliver  MRN: [id number]  Today's Date: 8/31/2023    Admit Date: 8/30/2023    Plan: [facility]   Discharge Plan       Row Name 08/31/23 1046       Plan    Plan [facility]    Patient/Family in Agreement with Plan yes    Plan Comments Spoke with pt, verified correct information on facesheet and explained the role of CCP. Pt states he has used [facility] in the past, referral was sent in [facility] and per Maria T/SOFIA they no longer accept that insurance. Discussed other HH options with pt, pt states he would like [facility]. Referral sent in [facility] to [facility] accepted. Plan will be to d/c home with [facility] and family support.    Final Discharge Disposition Code 01 - home or self-care    Final Note [facility]                   Discharge Codes    No documentation.                 Expected Discharge Date and Time       Expected Discharge Date Expected Discharge Time    Aug 31, 2023               Zena Luis RN

## 2023-08-31 NOTE — THERAPY DISCHARGE NOTE
Patient Name: Sachin Tolliver  : 1955    MRN: [id number]                              Today's Date: 2023       Admit Date: 2023    Visit Dx:     ICD-10-CM ICD-9-CM   1. S/P TKR (total knee replacement), left  Z96.652 V43.65   2. Primary osteoarthritis of left knee  M17.12 715.16     Patient Active Problem List   Diagnosis    Benign essential hypertension    Hereditary hemolytic anemia    Hyperlipidemia    Migraine    Situational depression    Aortic valve calcification    Obesity (BMI 30.0-34.9)    S/P aortic valve replacement with tissue    S/P CABG x 1    Primary osteoarthritis of left knee     Past Medical History:   Diagnosis Date    Aortic stenosis due to bicuspid aortic valve 2018    Aortic valve calcification     Benign essential hypertension     Broken nose     CAD (coronary artery disease)     Hx CABG    Dressler syndrome 2018    Fracture, radius     Heart murmur     Hemolytic anemia     Hyperlipidemia     Controlled w/Meds    Hypertension     Controlled w/Meds    Left knee pain     Migraine     Mild mitral regurgitation     Nonrheumatic aortic (valve) stenosis     Pulmonic regurgitation     Trace    Reactive depression (situational)     Severe aortic valve stenosis     S/p AVR on 07/10/18 by Dr. Vidales    Tennis elbow     Tricuspid regurgitation     Trace     Past Surgical History:   Procedure Laterality Date    ADENOIDECTOMY  1963    AORTIC VALVE REPAIR/REPLACEMENT N/A 07/10/2018    Procedure: INTROP MARIAA; AORTIC VALVE REPLACEMENT; CORONARY ARTERY BYPASS X1 UTILIZING THE ENDOSCOPICALLY HARVESTED LEFT GREATER SAPHENOUS VEIN; PRP;  Surgeon: Hudson Vidales MD;  Location: [facility] OR;  Service: Cardiothoracic    CARDIAC CATHETERIZATION N/A 2018    Procedure: Right and Left Heart Cath;  Surgeon: Alexandria Ashraf MD;  Location: [facility] INVASIVE LOCATION;  Service: Cardiovascular    CARDIAC CATHETERIZATION N/A 2018    Procedure: Coronary angiography;   Surgeon: Alexandria Ashraf MD;  Location: [facility] CATH INVASIVE LOCATION;  Service: Cardiovascular    CARDIAC VALVE REPLACEMENT  7/10/2018    COLECTOMY PARTIAL / TOTAL      COLON SURGERY  12/25/2011    COLONOSCOPY N/A 02/10/2020    Procedure: COLONOSCOPY;  Surgeon: Kimberli Joshi MD;  Location: [facility] ENDOSCOPY;  Service: General;  Laterality: N/A;  PRE- SCREENING  POST-- DIVERTICULOSIS    CORONARY ARTERY BYPASS GRAFT      OTHER SURGICAL HISTORY      Tunica Vaginalis Excision of Hydrocele Left    TONSILLECTOMY      TOTAL KNEE ARTHROPLASTY Left 8/30/2023    Procedure: TOTAL KNEE ARTHROPLASTY;  Surgeon: Jesse Martinez MD;  Location:  [facility];  Service: Orthopedics;  Laterality: Left;      General Information       Row Name 08/31/23 1038          Physical Therapy Time and Intention    Document Type therapy note (daily note);discharge treatment  -MS     Mode of Treatment physical therapy;individual therapy  -MS       Row Name 08/31/23 1038          General Information    Patient Profile Reviewed yes  -MS     Existing Precautions/Restrictions --  Exit alarm  -MS     Barriers to Rehab none identified  -MS       Row Name 08/31/23 1038          Cognition    Orientation Status (Cognition) oriented x 3  -MS       Row Name 08/31/23 1038          Safety Issues, Functional Mobility    Comment, Safety Issues/Impairments (Mobility) Gait belt used for safety.  -MS               User Key  (r) = Recorded By, (t) = Taken By, (c) = Cosigned By      Initials Name Provider Type    MS Jasbir Simmons, PT Physical Therapist                   Mobility       Row Name 08/31/23 1038          Bed Mobility    Comment, (Bed Mobility) Up in chair this AM.  -MS       Row Name 08/31/23 1038          Sit-Stand Transfer    Sit-Stand [place] (Transfers) independent  -MS     Assistive Device (Sit-Stand Transfers) walker, front-wheeled  -MS       Row Name 08/31/23 1038          Gait/Stairs (Locomotion)    [place] Level (Gait) standby  assist  -MS     Assistive Device (Gait) walker, front-wheeled  -MS     Distance in Feet (Gait) 100 feet  -MS     [place] Level (Stairs) stand by assist  -MS     Handrail Location (Stairs) both sides  -MS     Number of Steps (Stairs) 4  -MS     Ascending Technique (Stairs) step-to-step  -MS     Descending Technique (Stairs) step-to-step  -MS       Row Name 08/31/23 1038          Mobility    Left Lower Extremity (Weight-bearing Status) weight-bearing as tolerated (WBAT)  -MS               User Key  (r) = Recorded By, (t) = Taken By, (c) = Cosigned By      Initials Name Provider Type    Jasbir Mayorga, PT Physical Therapist                   Obj/Interventions       Row Name 08/31/23 1038          Motor Skills    Therapeutic Exercise --  Left TKR ther. ex. program x 10 reps completed  -MS               User Key  (r) = Recorded By, (t) = Taken By, (c) = Cosigned By      Initials Name Provider Type    Jasbir Mayorga, PT Physical Therapist                   Goals/Plan    No documentation.                  Clinical Impression       Row Name 08/31/23 1039          Pain    Pretreatment Pain Rating 2/10  -MS     Posttreatment Pain Rating 2/10  -MS     Pain Location - Side/Orientation Left  -MS     Pain Location - knee  -MS       Row Name 08/31/23 1039          Positioning and Restraints    Pre-Treatment Position sitting in chair/recliner  -MS     Post Treatment Position chair  -MS     In Chair notified nsg;reclined;sitting;call light within reach;encouraged to call for assist;exit alarm on  -MS               User Key  (r) = Recorded By, (t) = Taken By, (c) = Cosigned By      Initials Name Provider Type    Jasbir Mayorga, PT Physical Therapist                   Outcome Measures       Row Name 08/31/23 1039          How much help from another person do you currently need...    Turning from your back to your side while in flat bed without using bedrails? 4  -MS     Moving from lying on back to sitting on  the side of a flat bed without bedrails? 3  -MS     Moving to and from a bed to a chair (including a wheelchair)? 3  -MS     Standing up from a chair using your arms (e.g., wheelchair, bedside chair)? 4  -MS     Climbing 3-5 steps with a railing? 3  -MS     To walk in hospital room? 3  -MS     AM-PAC 6 Clicks Score (PT) 20  -MS     Highest level of mobility 6 --> Walked 10 steps or more  -MS       Row Name 08/31/23 1039          Functional Assessment    Outcome Measure Options AM-PAC 6 Clicks Basic Mobility (PT)  -MS               User Key  (r) = Recorded By, (t) = Taken By, (c) = Cosigned By      Initials Name Provider Type    MS Jasbir Simmons, PT Physical Therapist                  Physical Therapy Education       Title: PT OT SLP Therapies (Resolved)       Topic: Physical Therapy (Resolved)       Point: Mobility training (Resolved)       Learning Progress Summary             Patient Acceptance, E,D, VU,DU by MS at 8/31/2023 1039    Acceptance, E,D, VU,NR by MS at 8/30/2023 1435                         Point: Home exercise program (Resolved)       Learning Progress Summary             Patient Acceptance, E,D, VU,DU by MS at 8/31/2023 1039    Acceptance, E,D, VU,NR by MS at 8/30/2023 1435                         Point: Body mechanics (Resolved)       Learning Progress Summary             Patient Acceptance, E,D, VU,DU by MS at 8/31/2023 1039    Acceptance, E,D, VU,NR by MS at 8/30/2023 1435                         Point: Precautions (Resolved)       Learning Progress Summary             Patient Acceptance, E,D, VU,DU by MS at 8/31/2023 1039    Acceptance, E,D, VU,NR by MS at 8/30/2023 1435                                         User Key       Initials Effective Dates Name Provider Type Discipline    MS 06/16/21 -  Jasbir Simmons PT Physical Therapist PT                  PT Recommendation and Plan  Planned Therapy Interventions (PT): balance training, bed mobility training, gait training, home exercise  program, postural re-education, patient/family education, ROM (range of motion), stair training, strengthening, transfer training  Plan of Care Reviewed With: patient  Outcome Evaluation: Pt. is currently independent/SBA with functional mobility and has no further questions/concerns regarding functional mobility or home safety.  Encouraged pt. to continue ther. ex. program 2-3 x's daily and to ambulate every 1-2 hours once home. Plan for discharge home this date.  Will sign off.     Time Calculation:         PT Charges       Row Name 08/31/23 1040             Time Calculation    Start Time 0925  -MS      Stop Time 0940  -MS      Time Calculation (min) 15 min  -MS      PT Received On 08/31/23  -MS         Time Calculation- PT    Total Timed Code Minutes- PT 14 minute(s)  -MS                User Key  (r) = Recorded By, (t) = Taken By, (c) = Cosigned By      Initials Name Provider Type    Jasbir Mayorga, PT Physical Therapist                  Therapy Charges for Today       Code Description Service Date Service Provider Modifiers Qty    52114224324 HC PT EVAL LOW COMPLEXITY 2 8/30/2023 Jasbir Simmons, PT GP 1    13362200302 HC PT THER PROC EA 15 MIN 8/30/2023 Jasbir Simmons, PT GP 1    05284569990 HC PT THERAPEUTIC ACT EA 15 MIN 8/31/2023 Jasbir Simmons, PT GP 1            PT G-Codes  Outcome Measure Options: AM-PAC 6 Clicks Basic Mobility (PT)  AM-PAC 6 Clicks Score (PT): 20    PT Discharge Summary  Anticipated Discharge Disposition (PT): home with assist, home with home health  Reason for Discharge: Discharge from facility  Discharge Destination: Home with assist, Home with home health    Jasbir Simmons, SANDIE  8/31/2023

## 2023-09-01 ENCOUNTER — TELEPHONE (OUTPATIENT)
Dept: ORTHOPEDIC SURGERY | Facility: CLINIC | Age: 68
End: 2023-09-01

## 2023-09-01 ENCOUNTER — TRANSITIONAL CARE MANAGEMENT TELEPHONE ENCOUNTER (OUTPATIENT)
Dept: CALL CENTER | Facility: HOSPITAL | Age: 68
End: 2023-09-01
Payer: MEDICARE

## 2023-09-01 NOTE — TELEPHONE ENCOUNTER
Please let patient know that it is not unusual to have temporary mild elevations in temperature after surgery.  This is not something that is treated with an antibiotic.  Would recommend that he use his incentive spirometer that they gave him in the hospital, make sure he is taking deep breaths, drinking plenty of fluids, and call if there are any changes or temperature greater than 101.5

## 2023-09-01 NOTE — OUTREACH NOTE
Call Center TCM Note      Flowsheet Row Responses   [facility] patient discharged from? [place]   Does the patient have one of the following disease processes/diagnoses(primary or secondary)? Total Joint Replacement   Joint surgery performed? Knee   TCM attempt successful? Yes   Call start time 1531   Call end time 1534   General alerts for this patient TOTAL KNEE ARTHROPLASTY   Discharge diagnosis Primary osteoarthritis of left knee   Person spoke with today (if not patient) and relationship patient   Does the patient have all medications related to this admission filled (includes all antibiotics, pain medications, etc.) Yes   Is the patient taking all medications as directed (includes completed medication regime)? Yes   Comments Patient has a followup with Dr. Martinez on 9/19/2023   Does the patient have an appointment with their PCP within 7-14 days of discharge? No   Nursing Interventions Patient desires to follow up with specialty only   What is the Home health agency?  [facility]   Home health comments Patient has followups for PT in place   Has the patient began therapy sessions (either in the home or as an out patient)? No   Does the patient have a wound vac in place? N/A   Has the patient fallen since discharge? Yes   Did the patient receive a copy of their discharge instructions? Yes   Nursing interventions Reviewed instructions with patient   What is the patient's perception of their functional status since discharge? Improving   Is the patient able to teach back signs and symptoms of infection? Temp >100.4 for 24h or longer, Increased swelling or redness around incision (not associated with surgical edema)  [Patient has spoke with surgeon today concerning low grade fever. ]   If the patient is a current smoker, are they able to teach back resources for cessation? Not a smoker   Is the patient/caregiver able to teach back the hierarchy of who to call/visit for symptoms/problems?  PCP, Specialist, Home health nurse, Urgent Care, ED, 911 Yes   TCM call completed? Yes   Wrap up additional comments No needs at this time.   Call end time 1534   Would this patient benefit from a Referral to [facility] Social Work? No   Is the patient interested in additional calls from an ambulatory ? No            Pj Canales RN    9/1/2023, 15:34 EDT

## 2023-09-01 NOTE — TELEPHONE ENCOUNTER
Caller: Sachin Tolliver    Relationship to patient: Self    Best call back number: [phone]    Patient is needing: PATIENT HAD SURGERY WITH DR. RICHARD LAST 08/30 AND HE IS EXPERIENCING LOW FEVER  SINCE THIS MORNING. PATIENT REQUESTING ANTIBIOTIC TO BE SENT TO [facility] [phone] ASAP. PLEASE CALL BACK TO ADVISE.

## 2023-09-19 ENCOUNTER — OFFICE VISIT (OUTPATIENT)
Dept: ORTHOPEDIC SURGERY | Facility: CLINIC | Age: 68
End: 2023-09-19
Payer: MEDICARE

## 2023-09-19 VITALS — TEMPERATURE: 97.6 F | BODY MASS INDEX: 32.52 KG/M2 | WEIGHT: 253.4 LBS | HEIGHT: 74 IN

## 2023-09-19 DIAGNOSIS — Z96.652 STATUS POST LEFT KNEE REPLACEMENT: Primary | ICD-10-CM

## 2023-09-19 PROCEDURE — 99024 POSTOP FOLLOW-UP VISIT: CPT | Performed by: ORTHOPAEDIC SURGERY

## 2023-09-19 NOTE — PROGRESS NOTES
Sachin Tolliver : 1955 MRN: [id number] DATE: 2023    DIAGNOSIS: 2 week follow up left total knee      SUBJECTIVE:Patient returns today for 2 week follow up of left total knee replacement. Patient reports doing well with no unusual complaints. Appears to be progressing appropriately.    OBJECTIVE:   Exam:. The incision is healing appropriately. No sign of infection. Range of motion is progressing as expected. The calf is soft and nontender with a negative Homans sign.    ASSESSMENT: 2 week status post left knee replacement.    PLAN: 1) Staples removed and steri strips applied   2) Order given for PT   3) Discontinue WOLFGANG hose   4) Continue ice PRN   5) aspirin 81 mg orally every day for 6 weeks   6) Follow up in 6 weeks with repeat Xrays of left knee (3views)    Jesse Martinez MD  2023

## 2023-10-16 RX ORDER — METOPROLOL TARTRATE 50 MG/1
50 TABLET, FILM COATED ORAL 2 TIMES DAILY
Qty: 180 TABLET | Refills: 1 | Status: SHIPPED | OUTPATIENT
Start: 2023-10-16

## 2023-10-31 ENCOUNTER — OFFICE VISIT (OUTPATIENT)
Dept: ORTHOPEDIC SURGERY | Facility: CLINIC | Age: 68
End: 2023-10-31
Payer: MEDICARE

## 2023-10-31 VITALS — RESPIRATION RATE: 16 BRPM | WEIGHT: 250 LBS | HEIGHT: 74 IN | TEMPERATURE: 96.3 F | BODY MASS INDEX: 32.08 KG/M2

## 2023-10-31 DIAGNOSIS — R52 PAIN: Primary | ICD-10-CM

## 2023-10-31 DIAGNOSIS — Z96.652 STATUS POST LEFT KNEE REPLACEMENT: ICD-10-CM

## 2023-10-31 PROCEDURE — 99024 POSTOP FOLLOW-UP VISIT: CPT | Performed by: NURSE PRACTITIONER

## 2023-10-31 NOTE — PROGRESS NOTES
Sachin Tolliver : 1955 MRN: [id number] DATE: 10/31/2023    DIAGNOSIS: 8 week follow up left total knee      SUBJECTIVE:Patient returns today for 8 week follow up of left total knee replacement. Patient reports doing well with no unusual complaints. Appears to be progressing appropriately.  Patient reports that her pain level is very tolerable at this time.  States that he is still going to physical therapy with [facility] and progressing well.  Denies any limitations or buckling of the knee.  Denies any signs or symptoms of infection, and he is without any other significant complaints today.    OBJECTIVE:   Exam:. The incision is well healed. No sign of infection. Range of motion is measured at 0 to 125. The calf is soft and nontender with a negative Homans sign. Strength is progressing and the patient is ambulating appropriately.    DIAGNOSTIC STUDIES  Xrays: 3 views of the left knee (AP, lateral, and sunrise) were ordered and reviewed for evaluation of recent knee replacement. They demonstrate a well positioned, well aligned knee replacement without complicating factors noted. In comparison with previous films there has been no change.    ASSESSMENT: 8 week status post left knee replacement.    PLAN: 1) Continue with PT exercises as prescribed   2) Follow up 10 months for annual visit    MITCHELL Hyde  10/31/2023

## 2024-01-31 RX ORDER — ATORVASTATIN CALCIUM 40 MG/1
40 TABLET, FILM COATED ORAL NIGHTLY
Qty: 90 TABLET | Refills: 0 | Status: SHIPPED | OUTPATIENT
Start: 2024-01-31

## 2024-03-26 DIAGNOSIS — F43.21 SITUATIONAL DEPRESSION: ICD-10-CM

## 2024-03-26 RX ORDER — CITALOPRAM 20 MG/1
20 TABLET ORAL DAILY
Qty: 90 TABLET | Refills: 3 | Status: SHIPPED | OUTPATIENT
Start: 2024-03-26

## 2024-04-15 RX ORDER — ATORVASTATIN CALCIUM 40 MG/1
40 TABLET, FILM COATED ORAL NIGHTLY
Qty: 90 TABLET | Refills: 0 | Status: SHIPPED | OUTPATIENT
Start: 2024-04-15

## 2024-04-15 RX ORDER — METOPROLOL TARTRATE 50 MG/1
50 TABLET, FILM COATED ORAL 2 TIMES DAILY
Qty: 180 TABLET | Refills: 1 | Status: SHIPPED | OUTPATIENT
Start: 2024-04-15

## 2024-07-29 RX ORDER — ATORVASTATIN CALCIUM 40 MG/1
40 TABLET, FILM COATED ORAL NIGHTLY
Qty: 90 TABLET | Refills: 0 | Status: SHIPPED | OUTPATIENT
Start: 2024-07-29

## 2024-08-16 ENCOUNTER — OFFICE VISIT (OUTPATIENT)
Dept: FAMILY MEDICINE CLINIC | Facility: CLINIC | Age: 69
End: 2024-08-16
Payer: MEDICARE

## 2024-08-16 VITALS
RESPIRATION RATE: 18 BRPM | OXYGEN SATURATION: 96 % | HEIGHT: 74 IN | BODY MASS INDEX: 33.07 KG/M2 | SYSTOLIC BLOOD PRESSURE: 118 MMHG | WEIGHT: 257.7 LBS | HEART RATE: 76 BPM | DIASTOLIC BLOOD PRESSURE: 78 MMHG

## 2024-08-16 DIAGNOSIS — D58.9 HEREDITARY HEMOLYTIC ANEMIA: ICD-10-CM

## 2024-08-16 DIAGNOSIS — Z95.1 S/P CABG X 1: ICD-10-CM

## 2024-08-16 DIAGNOSIS — Z12.5 SCREENING FOR MALIGNANT NEOPLASM OF PROSTATE: ICD-10-CM

## 2024-08-16 DIAGNOSIS — E78.2 MIXED HYPERLIPIDEMIA: ICD-10-CM

## 2024-08-16 DIAGNOSIS — I10 BENIGN ESSENTIAL HYPERTENSION: Primary | ICD-10-CM

## 2024-08-16 PROCEDURE — 1160F RVW MEDS BY RX/DR IN RCRD: CPT | Performed by: FAMILY MEDICINE

## 2024-08-16 PROCEDURE — G0439 PPPS, SUBSEQ VISIT: HCPCS | Performed by: FAMILY MEDICINE

## 2024-08-16 PROCEDURE — 3074F SYST BP LT 130 MM HG: CPT | Performed by: FAMILY MEDICINE

## 2024-08-16 PROCEDURE — 3078F DIAST BP <80 MM HG: CPT | Performed by: FAMILY MEDICINE

## 2024-08-16 PROCEDURE — 1126F AMNT PAIN NOTED NONE PRSNT: CPT | Performed by: FAMILY MEDICINE

## 2024-08-16 PROCEDURE — 1159F MED LIST DOCD IN RCRD: CPT | Performed by: FAMILY MEDICINE

## 2024-08-16 NOTE — ASSESSMENT & PLAN NOTE
BP is controlled well. He will recheck in six months. He will continue present meds. Prescription is sent today.

## 2024-08-16 NOTE — PROGRESS NOTES
"Subjective   The ABCs of the Annual Wellness Visit  Medicare Wellness Visit      Sachin Tolliver is a 68 y.o. patient who presents for a Medicare Wellness Visit.  Compared to one year ago, the patient feels his physical health is better and his mental health is the same.  Recent Hospitalizations:  He was not admitted to the hospital during the last year.   Current Medical Providers:  Patient Care Team:  Milvia Briceno MD as PCP - General  Иван Irene MD as Consulting Physician (Cardiology)  Luz Jean APRN as Nurse Practitioner (Nurse Practitioner)    No opioid medication identified on active medication list. I have reviewed chart for other potential  high risk medication/s and harmful drug interactions in the elderly.  Aspirin is on active medication list. Aspirin use is indicated based on review of current medical condition/s. Pros and cons of this therapy have been discussed today. Benefits of this medication outweigh potential harm.  Patient has been encouraged to continue taking this medication.  .    Advance Care Planning Advance Directive is on file.  ACP discussion was held with the patient during this visit. Patient has an advance directive in EMR which is still valid.       Objective   Vitals:    08/16/24 1302   BP: 118/78   Pulse: 76   Resp: 18   SpO2: 96%   Weight: 117 kg (257 lb 11.2 oz)   Height: 188 cm (74\")       Estimated body mass index is 33.09 kg/m² as calculated from the following:    Height as of this encounter: 188 cm (74\").    Weight as of this encounter: 117 kg (257 lb 11.2 oz).     Does the patient have evidence of cognitive impairment? No                                                                                                Health  Risk Assessment    Smoking Status:  Social History     Tobacco Use   Smoking Status Never   Smokeless Tobacco Never     Alcohol Consumption:  Social History     Substance and Sexual Activity   Alcohol Use Yes    Alcohol/week: 4.0 " standard drinks of alcohol    Types: 4 Cans of beer per week    Comment: Caffeine Use       Fall Risk Navigator Hyperlink  GAVIN Fall Risk Assessment was completed, and patient is at LOW risk for falls.Assessment completed on:2024    Depression Screenin/16/2024     1:02 PM   PHQ-2/PHQ-9 Depression Screening   Little Interest or Pleasure in Doing Things 0-->not at all   Feeling Down, Depressed or Hopeless 0-->not at all   PHQ-9: Brief Depression Severity Measure Score 0     Health Habits and Functional and Cognitive Screenin/9/2024     7:50 AM   Functional & Cognitive Status   Do you have difficulty preparing food and eating? No   Do you have difficulty bathing yourself, getting dressed or grooming yourself? No   Do you have difficulty using the toilet? No   Do you have difficulty moving around from place to place? No   Do you have trouble with steps or getting out of a bed or a chair? No   Current Diet Low Fat Diet   Dental Exam Up to date   Eye Exam Up to date   Exercise (times per week) 3 times per week   Current Exercises Include Aerobics;Cardiovascular Workout;Gardening;Home Exercise Program (TV, Computer, Etc.);Light Weights;Treadmill   Do you need help using the phone?  No   Are you deaf or do you have serious difficulty hearing?  No   Do you need help to go to places out of walking distance? No   Do you need help shopping? No   Do you need help preparing meals?  No   Do you need help with housework?  No   Do you need help with laundry? No   Do you need help taking your medications? No   Do you need help managing money? No   Do you ever drive or ride in a car without wearing a seat belt? No   Have you felt unusual stress, anger or loneliness in the last month? No   Who do you live with? Spouse   If you need help, do you have trouble finding someone available to you? No   Have you been bothered in the last four weeks by sexual problems? No   Do you have difficulty concentrating,  remembering or making decisions? No             Age-appropriate Screening Schedule:  Orders for future screening recommendations based on patient's age, sex and/or medical conditions are listed in the plan section. The patient has been provided with a written plan.  ___________________________________________                                                                                                                                           CMS Preventative Services Quick Reference  Risk Factors Identified During Encounter  None Identified    The above risks/problems have been discussed with the patient.  Pertinent information has been shared with the patient in the After Visit Summary.  An After Visit Summary and PPPS were made available to the patient.    Follow Up:   Next Medicare Wellness visit to be scheduled in 1 year.       Additional E&M Note during same encounter follows:  Patient has additional, significant, and separately identifiable condition(s)/problem(s) that require work above and beyond the Medicare Wellness Visit          Orders Placed This Encounter   Procedures    CBC (No Diff)    Comprehensive Metabolic Panel    Lipid Panel With LDL / HDL Ratio    PSA Screen      Assessment & Plan  Benign essential hypertension  BP is controlled well. He will recheck in six months. He will continue present meds. Prescription is sent today.   Mixed hyperlipidemia   Kelvin 8/16/2024    Patient has been on hyperlipidemia medications for some time.  He is doing fine with that and labs will be done soon.   Screening for malignant neoplasm of prostate    Hereditary hemolytic anemia  CBC   S/P CABG x 1           Sachin is a 68 y.o. being seen today for     HISTORY    HPI  History of Present Illness    He reports an improvement in his physical health compared to the previous year, while his mental health remains stable. He underwent knee surgery on 08/30/2023. He did not fast today due to forgetfulness. He expresses  a desire to have his PSA levels checked.  Social History  He  reports that he has never smoked. He has never used smokeless tobacco. He reports current alcohol use of about 4.0 standard drinks of alcohol per week. He reports that he does not use drugs.  EXAM DATA    Vital Signs        BP Readings from Last 1 Encounters:   08/16/24 118/78     Wt Readings from Last 3 Encounters:   08/16/24 117 kg (257 lb 11.2 oz)   10/31/23 113 kg (250 lb)   09/19/23 115 kg (253 lb 6.4 oz)   Body mass index is 33.09 kg/m².  Physical Exam  Vitals reviewed.   Constitutional:       Appearance: Normal appearance.   Cardiovascular:      Rate and Rhythm: Normal rate and regular rhythm.      Heart sounds: Murmur heard.      Gallop: 2/6 LLSB.   Pulmonary:      Effort: Pulmonary effort is normal.      Breath sounds: Normal breath sounds. No wheezing.   Neurological:      Mental Status: He is alert and oriented to person, place, and time.   Psychiatric:         Mood and Affect: Mood normal.         Behavior: Behavior normal.         Thought Content: Thought content normal.         Judgment: Judgment normal.             Patient or patient representative verbalized consent for the use of Ambient Listening during the visit with  Milvia Briceno MD for chart documentation. 8/16/2024  13:32 EDT

## 2024-08-16 NOTE — ASSESSMENT & PLAN NOTE
Kelvin 8/16/2024    Patient has been on hyperlipidemia medications for some time.  He is doing fine with that and labs will be done soon.

## 2024-09-11 ENCOUNTER — OFFICE VISIT (OUTPATIENT)
Dept: ORTHOPEDIC SURGERY | Facility: CLINIC | Age: 69
End: 2024-09-11
Payer: MEDICARE

## 2024-09-11 VITALS — BODY MASS INDEX: 33.24 KG/M2 | WEIGHT: 259 LBS | TEMPERATURE: 97.3 F | HEIGHT: 74 IN

## 2024-09-11 DIAGNOSIS — R52 PAIN: Primary | ICD-10-CM

## 2024-09-11 DIAGNOSIS — Z96.652 STATUS POST LEFT KNEE REPLACEMENT: ICD-10-CM

## 2024-09-11 PROCEDURE — 99212 OFFICE O/P EST SF 10 MIN: CPT | Performed by: NURSE PRACTITIONER

## 2024-09-11 PROCEDURE — 73562 X-RAY EXAM OF KNEE 3: CPT | Performed by: NURSE PRACTITIONER

## 2024-09-11 NOTE — PROGRESS NOTES
"Sachin Tolliver : 1955 MRN: [id number] DATE: 2024    DIAGNOSIS: Annual follow up left total knee      SUBJECTIVE:Patient returns today for a one year follow up of left total knee replacement. Patient reports doing well with no unusual complaints. Denies any limitations or any instability issues due to the knee. States he is happy with his post op outcome. Denies any signs of symptoms of infection, and is without any other significant complaints today.     OBJECTIVE:    Temp 97.3 °F (36.3 °C) (Temporal)   Ht 188 cm (74\")   Wt 117 kg (259 lb)   BMI 33.25 kg/m²   Family History   Problem Relation Age of Onset    Rheumatologic disease Mother         Arthritis in both hands    Arthritis Mother     Anemia Father         Hemolytic    Stroke Paternal Grandfather     Malig Hyperthermia Neg Hx      Past Medical History:   Diagnosis Date    Aortic stenosis due to bicuspid aortic valve 2018    Aortic valve calcification     Benign essential hypertension     Broken nose     CAD (coronary artery disease)     Hx CABG    Diverticulosis     Dressler syndrome 2018    Fracture, radius     Heart murmur     Hemolytic anemia     Hyperlipidemia     Controlled w/Meds    Hypertension     Controlled w/Meds    Knee swelling 10/01/2022    Left knee pain     Migraine     Mild mitral regurgitation     Nonrheumatic aortic (valve) stenosis     Obesity     Pulmonic regurgitation     Trace    Reactive depression (situational)     Severe aortic valve stenosis     S/p AVR on 07/10/18 by Dr. Vidales    Tennis elbow     Tricuspid regurgitation     Trace     Past Surgical History:   Procedure Laterality Date    ADENOIDECTOMY  1963    AORTIC VALVE REPAIR/REPLACEMENT N/A 07/10/2018    Procedure: INTROP MARIAA; AORTIC VALVE REPLACEMENT; CORONARY ARTERY BYPASS X1 UTILIZING THE ENDOSCOPICALLY HARVESTED LEFT GREATER SAPHENOUS VEIN; PRP;  Surgeon: Hudson Vidales MD;  Location: [facility];  Service: Cardiothoracic    CARDIAC " CATHETERIZATION N/A 07/09/2018    Procedure: Right and Left Heart Cath;  Surgeon: Alexandria Ashraf MD;  Location: [facility] CATH INVASIVE LOCATION;  Service: Cardiovascular    CARDIAC CATHETERIZATION N/A 07/09/2018    Procedure: Coronary angiography;  Surgeon: Alexandria Ashraf MD;  Location: [facility] CATH INVASIVE LOCATION;  Service: Cardiovascular    CARDIAC VALVE REPLACEMENT  7/10/2018    COLECTOMY PARTIAL / TOTAL      COLON SURGERY  12/25/2011    COLONOSCOPY N/A 02/10/2020    Procedure: COLONOSCOPY;  Surgeon: Kimberli Joshi MD;  Location: [facility] ENDOSCOPY;  Service: General;  Laterality: N/A;  PRE- SCREENING  POST-- DIVERTICULOSIS    CORONARY ARTERY BYPASS GRAFT      JOINT REPLACEMENT      KNEE SURGERY      OTHER SURGICAL HISTORY      [place] Vaginalis Excision of Hydrocele Left    TONSILLECTOMY      TOTAL KNEE ARTHROPLASTY Left 08/30/2023    Procedure: TOTAL KNEE ARTHROPLASTY;  Surgeon: Jesse Martinez MD;  Location: [facility] OR [facility];  Service: Orthopedics;  Laterality: Left;     Social History     Socioeconomic History    Marital status:      Spouse name: Yaneth Tolliver    Number of children: 2    Years of education: 14   Tobacco Use    Smoking status: Never     Passive exposure: Never    Smokeless tobacco: Never   Vaping Use    Vaping status: Never Used   Substance and Sexual Activity    Alcohol use: Yes     Alcohol/week: 4.0 standard drinks of alcohol     Types: 4 Cans of beer per week     Comment: Caffeine Use    Drug use: No    Sexual activity: Yes     Partners: Female     Birth control/protection: Surgical     Comment: with wife only!     Review of Systems - a 14 point review of systems was performed. All systems were negative.    Exam:. The incision is well healed. Range of motion is measured at 0 to 120. The calf is soft and nontender with a negative Homans sign. Alignment is neutral. Good quad strength. There is no evidence of varus/valgus or flexion instability. No effusion. Intact to light touch with  palpable distal pulses.     DIAGNOSTIC STUDIES  Xrays: 3 views of the left knee (AP, lateral, and sunrise) were ordered and reviewed for evaluation of recent knee replacement. They demonstrate a well positioned, well aligned knee replacement without complicating factors noted. In comparison with previous films there has been no change.    ASSESSMENT: Annual follow up left knee replacement.    PLAN:  Continue activities as tolerated    Follow up MITCHELL Barros  9/11/2024

## 2024-09-16 ENCOUNTER — OFFICE VISIT (OUTPATIENT)
Dept: CARDIOLOGY | Facility: CLINIC | Age: 69
End: 2024-09-16
Payer: MEDICARE

## 2024-09-16 VITALS
SYSTOLIC BLOOD PRESSURE: 126 MMHG | BODY MASS INDEX: 33.62 KG/M2 | WEIGHT: 262 LBS | DIASTOLIC BLOOD PRESSURE: 80 MMHG | HEIGHT: 74 IN | HEART RATE: 69 BPM

## 2024-09-16 DIAGNOSIS — Z95.3 S/P AORTIC VALVE REPLACEMENT WITH BIOPROSTHETIC VALVE: ICD-10-CM

## 2024-09-16 DIAGNOSIS — I10 BENIGN ESSENTIAL HYPERTENSION: ICD-10-CM

## 2024-09-16 DIAGNOSIS — Z95.1 S/P CABG X 1: Primary | ICD-10-CM

## 2024-09-16 PROCEDURE — 99214 OFFICE O/P EST MOD 30 MIN: CPT | Performed by: INTERNAL MEDICINE

## 2024-09-16 PROCEDURE — 3074F SYST BP LT 130 MM HG: CPT | Performed by: INTERNAL MEDICINE

## 2024-09-16 PROCEDURE — 93000 ELECTROCARDIOGRAM COMPLETE: CPT | Performed by: INTERNAL MEDICINE

## 2024-09-16 PROCEDURE — 3079F DIAST BP 80-89 MM HG: CPT | Performed by: INTERNAL MEDICINE

## 2024-09-20 DIAGNOSIS — G43.109 MIGRAINE WITH AURA AND WITHOUT STATUS MIGRAINOSUS, NOT INTRACTABLE: ICD-10-CM

## 2024-09-20 RX ORDER — BUTALBITAL, ACETAMINOPHEN AND CAFFEINE 50; 325; 40 MG/1; MG/1; MG/1
1 TABLET ORAL EVERY 6 HOURS PRN
Qty: 25 TABLET | Refills: 0 | Status: CANCELLED | OUTPATIENT
Start: 2024-09-20

## 2024-09-22 DIAGNOSIS — G43.109 MIGRAINE WITH AURA AND WITHOUT STATUS MIGRAINOSUS, NOT INTRACTABLE: ICD-10-CM

## 2024-09-23 RX ORDER — BUTALBITAL, ACETAMINOPHEN AND CAFFEINE 50; 325; 40 MG/1; MG/1; MG/1
1 TABLET ORAL EVERY 6 HOURS PRN
Qty: 25 TABLET | Refills: 0 | Status: SHIPPED | OUTPATIENT
Start: 2024-09-23

## 2024-10-10 LAB
ERYTHROCYTE [DISTWIDTH] IN BLOOD BY AUTOMATED COUNT: 12.8 % (ref 12.3–15.4)
HCT VFR BLD AUTO: 44.4 % (ref 37.5–51)
HGB BLD-MCNC: 15.1 G/DL (ref 13–17.7)
MCH RBC QN AUTO: 30.5 PG (ref 26.6–33)
MCHC RBC AUTO-ENTMCNC: 34 G/DL (ref 31.5–35.7)
MCV RBC AUTO: 89.7 FL (ref 79–97)
PLATELET # BLD AUTO: 235 10*3/MM3 (ref 140–450)
RBC # BLD AUTO: 4.95 10*6/MM3 (ref 4.14–5.8)
WBC # BLD AUTO: 6.31 10*3/MM3 (ref 3.4–10.8)

## 2024-10-11 LAB
ALBUMIN SERPL-MCNC: 4.3 G/DL (ref 3.5–5.2)
ALBUMIN/GLOB SERPL: 1.9 G/DL
ALP SERPL-CCNC: 80 U/L (ref 39–117)
ALT SERPL-CCNC: 18 U/L (ref 1–41)
AST SERPL-CCNC: 25 U/L (ref 1–40)
BILIRUB SERPL-MCNC: 0.8 MG/DL (ref 0–1.2)
BUN SERPL-MCNC: 18 MG/DL (ref 8–23)
BUN/CREAT SERPL: 15.9 (ref 7–25)
CALCIUM SERPL-MCNC: 9.5 MG/DL (ref 8.6–10.5)
CHLORIDE SERPL-SCNC: 104 MMOL/L (ref 98–107)
CHOLEST SERPL-MCNC: 159 MG/DL (ref 0–200)
CO2 SERPL-SCNC: 28.4 MMOL/L (ref 22–29)
CREAT SERPL-MCNC: 1.13 MG/DL (ref 0.76–1.27)
EGFRCR SERPLBLD CKD-EPI 2021: 71.2 ML/MIN/1.73
GLOBULIN SER CALC-MCNC: 2.3 GM/DL
GLUCOSE SERPL-MCNC: 102 MG/DL (ref 65–99)
HDLC SERPL-MCNC: 50 MG/DL (ref 40–60)
LDLC SERPL CALC-MCNC: 94 MG/DL (ref 0–100)
LDLC/HDLC SERPL: 1.88 {RATIO}
POTASSIUM SERPL-SCNC: 5.1 MMOL/L (ref 3.5–5.2)
PROT SERPL-MCNC: 6.6 G/DL (ref 6–8.5)
PSA SERPL-MCNC: 1.96 NG/ML (ref 0–4)
SODIUM SERPL-SCNC: 141 MMOL/L (ref 136–145)
TRIGL SERPL-MCNC: 76 MG/DL (ref 0–150)
VLDLC SERPL CALC-MCNC: 15 MG/DL (ref 5–40)

## 2024-11-11 RX ORDER — METOPROLOL TARTRATE 50 MG
50 TABLET ORAL 2 TIMES DAILY
Qty: 180 TABLET | Refills: 1 | Status: SHIPPED | OUTPATIENT
Start: 2024-11-11

## 2024-12-10 RX ORDER — ATORVASTATIN CALCIUM 40 MG/1
40 TABLET, FILM COATED ORAL NIGHTLY
Qty: 90 TABLET | Refills: 0 | Status: SHIPPED | OUTPATIENT
Start: 2024-12-10

## 2025-02-07 RX ORDER — ATORVASTATIN CALCIUM 40 MG/1
40 TABLET, FILM COATED ORAL NIGHTLY
Qty: 90 TABLET | Refills: 0 | Status: CANCELLED | OUTPATIENT
Start: 2025-02-07

## 2025-02-10 RX ORDER — ATORVASTATIN CALCIUM 40 MG/1
40 TABLET, FILM COATED ORAL NIGHTLY
Qty: 90 TABLET | Refills: 0 | Status: SHIPPED | OUTPATIENT
Start: 2025-02-10

## 2025-04-15 DIAGNOSIS — F43.21 SITUATIONAL DEPRESSION: ICD-10-CM

## 2025-04-15 RX ORDER — CITALOPRAM HYDROBROMIDE 20 MG/1
20 TABLET ORAL DAILY
Qty: 90 TABLET | Refills: 1 | Status: SHIPPED | OUTPATIENT
Start: 2025-04-15

## 2025-05-08 RX ORDER — METOPROLOL TARTRATE 50 MG
50 TABLET ORAL 2 TIMES DAILY
Qty: 180 TABLET | Refills: 1 | Status: SHIPPED | OUTPATIENT
Start: 2025-05-08

## 2025-06-12 RX ORDER — ATORVASTATIN CALCIUM 40 MG/1
40 TABLET, FILM COATED ORAL NIGHTLY
Qty: 90 TABLET | Refills: 0 | Status: SHIPPED | OUTPATIENT
Start: 2025-06-12

## 2025-06-26 NOTE — TELEPHONE ENCOUNTER
Dr Irene has agreed to see patient tomorrow at 1:00pm at the [facility] office [id number] [address] at 1pm. I left a message for Mr Tolliver asking him to call me back to verify that he is agreeable to this time   Female

## (undated) DEVICE — SENSR O2 OXIMAX FNGR A/ 18IN NONSTR

## (undated) DEVICE — 3M™ IOBAN™ 2 ANTIMICROBIAL INCISE DRAPE 6640EZ: Brand: IOBAN™ 2

## (undated) DEVICE — CANN VESL CORNRY 14FR

## (undated) DEVICE — SUT PROLN 2/0 V5 48IN D9694

## (undated) DEVICE — 450 ML BOTTLE OF 0.05% CHLORHEXIDINE GLUCONATE IN 99.95% STERILE WATER FOR IRRIGATION, USP AND APPLICATOR.: Brand: IRRISEPT ANTIMICROBIAL WOUND LAVAGE

## (undated) DEVICE — ST TOURNI COMPL A/ 7IN

## (undated) DEVICE — GLV SURG SENSICARE PI PF LF 7 GRN STRL

## (undated) DEVICE — SUT PROLN MO.5 7/0 DBLARM BV175 6 2X30 BX/12

## (undated) DEVICE — UNDERGLV SURG BIOGEL INDICATOR LF PF 7.5

## (undated) DEVICE — Device

## (undated) DEVICE — KT MANIFLD CARDIAC

## (undated) DEVICE — PATIENT RETURN ELECTRODE, SINGLE-USE, CONTACT QUALITY MONITORING, ADULT, WITH 9FT CORD, FOR PATIENTS WEIGING OVER 33LBS. (15KG): Brand: MEGADYNE

## (undated) DEVICE — NEEDLE, QUINCKE 22GX3.5": Brand: MEDLINE INDUSTRIES, INC.

## (undated) DEVICE — BALN PRESS WEDGE 5F 110CM

## (undated) DEVICE — VASOVIEW HEMOPRO: Brand: VASOVIEW HEMOPRO

## (undated) DEVICE — HEMOCONCENTRATOR PERFUS LPS06

## (undated) DEVICE — PK ATS CUST W CARDIOTOMY RESEVOIR

## (undated) DEVICE — DRSNG WND GEL FIBR OPTICELL AG PLS W/SLV LF 4X5IN  STRL

## (undated) DEVICE — MEDI-VAC YANKAUER SUCTION HANDLE W/BULBOUS TIP: Brand: CARDINAL HEALTH

## (undated) DEVICE — GLV SURG SENSICARE PI MIC PF SZ7 LF STRL

## (undated) DEVICE — SUT PROLN 4/0 BB D/A 36IN 8581H

## (undated) DEVICE — SUT SILK 0 CT1 CR8 18IN C021D

## (undated) DEVICE — LOU OPEN HEART DR POLLOCK: Brand: MEDLINE INDUSTRIES, INC.

## (undated) DEVICE — SUT VIC 1 CT1 36IN J947H

## (undated) DEVICE — UNDERCAST PADDING: Brand: DEROYAL

## (undated) DEVICE — THE TORRENT IRRIGATION SCOPE CONNECTOR IS USED WITH THE TORRENT IRRIGATION TUBING TO PROVIDE IRRIGATION FLUIDS SUCH AS STERILE WATER DURING GASTROINTESTINAL ENDOSCOPIC PROCEDURES WHEN USED IN CONJUNCTION WITH AN IRRIGATION PUMP (OR ELECTROSURGICAL UNIT).: Brand: TORRENT

## (undated) DEVICE — CLAMP INSERT: Brand: STEALTH® CLAMP INSERT

## (undated) DEVICE — SENSR CERBRL O2 PK/2

## (undated) DEVICE — DRSNG SURESITE WNDW 2.38X2.75

## (undated) DEVICE — DUAL CUT SAGITTAL BLADE

## (undated) DEVICE — BIOPATCH™ ANTIMICROBIAL DRESSING WITH CHLORHEXIDINE GLUCONATE IS A HYDROPHILLIC POLYURETHANE ABSORPTIVE FOAM WITH CHLORHEXIDINE GLUCONATE (CHG) WHICH INHIBITS BACTERIAL GROWTH UNDER THE DRESSING. THE DRESSING IS INTENDED TO BE USED TO ABSORB EXUDATE, COVER A WOUND CAUSED BY VASCULAR AND NONVASCULAR PERCUTANEOUS MEDICAL DEVICES DURING SURGERY, AS WELL AS REDUCE LOCAL INFECTION AND COLONIZATION OF MICROORGANISMS.: Brand: BIOPATCH

## (undated) DEVICE — TUBING, SUCTION, 1/4" X 10', STRAIGHT: Brand: MEDLINE

## (undated) DEVICE — 32 FR STRAIGHT – SOFT PVC CATHETER: Brand: PVC THORACIC CATHETERS

## (undated) DEVICE — PREMIUM WET SKIN PREP TRAY: Brand: MEDLINE INDUSTRIES, INC.

## (undated) DEVICE — SUT PROLN 3/0 SH D/A 36IN 8522H

## (undated) DEVICE — MAT FLR ABSORBENT LG 4FT 10 2.5FT

## (undated) DEVICE — APPL CHLORAPREP W/TINT 10.5ML PERC STRL

## (undated) DEVICE — SPNG GZ WOVN 4X4IN 12PLY 10/BX STRL

## (undated) DEVICE — GLV SURG SENSICARE W/ALOE PF LF 7.5 STRL

## (undated) DEVICE — PK CATH CARD 40

## (undated) DEVICE — 12 FOOT DISPOSABLE EXTENSION CABLE WITH SAFE CONNECT / SCREW-DOWN

## (undated) DEVICE — INSUFFLATION TUBING,LAPAROSCOPIC: Brand: DEROYAL

## (undated) DEVICE — ST. SORBAVIEW ULTIMATE IJ SYSTEM A,C: Brand: CENTURION

## (undated) DEVICE — ROTATING SURGICAL PUNCHES, 1 PER POUCH: Brand: A&E MEDICAL / ROTATING SURGICAL PUNCHES

## (undated) DEVICE — 3M™ MEDIPORE™ H SOFT CLOTH SURGICAL TAPE 2862, 2 INCH X 10 YARD (5CM X 9,1M), 12 ROLLS/CASE: Brand: 3M™ MEDIPORE™

## (undated) DEVICE — SOL IRR NACL 0.9PCT 3000ML

## (undated) DEVICE — STERILE PATIENT PROTECTIVE PAD FOR IMP® KNEE POSITIONERS & COHESIVE WRAP (10 / CASE): Brand: DE MAYO KNEE POSITIONER®

## (undated) DEVICE — EXTENSION SET, MALE LUER LOCK ADAPTER WITH RETRACTABLE COLLAR

## (undated) DEVICE — VLV REL VAC VRV100 STRL

## (undated) DEVICE — CORONARY ARTERY BYPASS GRAFT MARKERS, STAINLESS STEEL, DISTAL, WITHOUT HOLDER: Brand: ANASTOMARK CORONARY ARTERY BYPASS GRAFT MARKERS, STAINLESS STEEL, DISTAL

## (undated) DEVICE — TBG PENCL TELESCP MEGADYNE SMOKE EVAC 10FT

## (undated) DEVICE — TRAP FLD MINIVAC MEGADYNE 100ML

## (undated) DEVICE — SCANLAN® SUTURE BOOT™ INSTRUMENT JAW COVERS - ORIGINAL YELLOW, STANDARD PKG (5 PAIR/CARTRIDGE, 1 CARTRIDGE/PKG): Brand: SCANLAN® SUTURE BOOT™ INSTRUMENT JAW COVERS

## (undated) DEVICE — CANN O2 ETCO2 FITS ALL CONN CO2 SMPL A/ 7IN DISP LF

## (undated) DEVICE — BLOWER/MISTER AXIOUS OPCAB W/TBG

## (undated) DEVICE — SYS CLS SKIN PREMIERPRO EXOFINFUSION 22CM

## (undated) DEVICE — PK KN TOTL 40

## (undated) DEVICE — CATH DIAG IMPULSE FR4 5F 100CM

## (undated) DEVICE — ADAPT CLN BIOGUARD AIR/H2O DISP

## (undated) DEVICE — PK HEART OPN 40

## (undated) DEVICE — Device: Brand: LEVEL 1

## (undated) DEVICE — PREP SOL POVIDONE/IODINE BT 4OZ

## (undated) DEVICE — CANN ART EOPA 3D NV W/CONN 20F

## (undated) DEVICE — CATH DIAG IMPULSE FL3.5 5F 100CM

## (undated) DEVICE — LN SMPL CO2 SHTRM SD STREAM W/M LUER

## (undated) DEVICE — HARMONIC SYNERGY DISSECTING HOOK WITH TORQUE WRENCH. FOR USE WITH BLUE HAND PIECE ONLY: Brand: HARMONIC SYNERGY

## (undated) DEVICE — KT DRN EVAC WND PVC PCH WTROC RND 10F400

## (undated) DEVICE — DRSNG BRDR MEPILEX P/OP SIL 4X12IN

## (undated) DEVICE — SOL ISO/ALC RUB 70PCT 4OZ

## (undated) DEVICE — ANTIBACTERIAL UNDYED BRAIDED (POLYGLACTIN 910), SYNTHETIC ABSORBABLE SUTURE: Brand: COATED VICRYL

## (undated) DEVICE — GW EMR FIX EXCHG J STD .035 3MM 260CM

## (undated) DEVICE — ADHS SKIN DERMABOND TOP ADVANCED

## (undated) DEVICE — SOL IRR H2O BTL 1000ML STRL

## (undated) DEVICE — GLV SURG BIOGEL M LTX PF 7 1/2

## (undated) DEVICE — THE STERILE LIGHT HANDLE COVER IS USED WITH STERIS SURGICAL LIGHTING AND VISUALIZATION SYSTEMS.

## (undated) DEVICE — PK PERFUS CUST W/CARDIOPLEGIA

## (undated) DEVICE — GLIDESHEATH BASIC HYDROPHILIC COATED INTRODUCER SHEATH: Brand: GLIDESHEATH

## (undated) DEVICE — GLV SURG SENSICARE W/ALOE PF LF 8 STRL

## (undated) DEVICE — KT ORCA ORCAPOD DISP STRL

## (undated) DEVICE — SOL IRR NACL 0.9PCT BT 1000ML

## (undated) DEVICE — SYS PERFUS SEP PLATLT W TIPS CUST

## (undated) DEVICE — BNDG ELAS ELITE V/CLOSE 6IN 5YD LF STRL

## (undated) DEVICE — APPL DURAPREP IODOPHOR APL 26ML